# Patient Record
Sex: FEMALE | Race: WHITE | Employment: OTHER | URBAN - NONMETROPOLITAN AREA
[De-identification: names, ages, dates, MRNs, and addresses within clinical notes are randomized per-mention and may not be internally consistent; named-entity substitution may affect disease eponyms.]

---

## 2017-03-21 ENCOUNTER — PROCEDURE VISIT (OUTPATIENT)
Dept: PHYSICAL MEDICINE AND REHAB | Age: 52
End: 2017-03-21

## 2017-03-21 DIAGNOSIS — M54.16 LUMBAR RADICULOPATHY: Primary | ICD-10-CM

## 2017-03-21 DIAGNOSIS — R20.0 RIGHT LEG NUMBNESS: ICD-10-CM

## 2017-03-21 PROCEDURE — 95886 MUSC TEST DONE W/N TEST COMP: CPT | Performed by: PSYCHIATRY & NEUROLOGY

## 2017-03-21 PROCEDURE — 95910 NRV CNDJ TEST 7-8 STUDIES: CPT | Performed by: PSYCHIATRY & NEUROLOGY

## 2017-08-25 ENCOUNTER — TELEPHONE (OUTPATIENT)
Dept: CARDIOLOGY | Age: 52
End: 2017-08-25

## 2017-09-09 ENCOUNTER — HOSPITAL ENCOUNTER (OUTPATIENT)
Age: 52
Discharge: HOME | End: 2017-09-09
Payer: COMMERCIAL

## 2017-09-09 ENCOUNTER — HOSPITAL ENCOUNTER (OUTPATIENT)
Dept: GENERAL RADIOLOGY | Age: 52
Discharge: HOME | End: 2017-09-09
Payer: COMMERCIAL

## 2017-09-09 DIAGNOSIS — Z01.811 PRE-OP CHEST EXAM: ICD-10-CM

## 2017-09-09 LAB
ANION GAP SERPL CALCULATED.3IONS-SCNC: 11 MEQ/L (ref 8–16)
BUN BLDV-MCNC: 10 MG/DL (ref 7–22)
CALCIUM SERPL-MCNC: 9.5 MG/DL (ref 8.5–10.5)
CHLORIDE BLD-SCNC: 103 MEQ/L (ref 98–111)
CO2: 29 MEQ/L (ref 23–33)
CREAT SERPL-MCNC: 0.6 MG/DL (ref 0.4–1.2)
GFR SERPL CREATININE-BSD FRML MDRD: > 90 ML/MIN/1.73M2
GLUCOSE BLD-MCNC: 169 MG/DL (ref 70–108)
HCT VFR BLD CALC: 39.6 % (ref 37–47)
HEMOGLOBIN: 13.6 GM/DL (ref 12–16)
INR BLD: 0.97 (ref 0.85–1.13)
MCH RBC QN AUTO: 31.2 PG (ref 27–31)
MCHC RBC AUTO-ENTMCNC: 34.4 GM/DL (ref 33–37)
MCV RBC AUTO: 90.9 FL (ref 81–99)
PDW BLD-RTO: 12.6 % (ref 11.5–14.5)
PLATELET # BLD: 268 THOU/MM3 (ref 130–400)
PMV BLD AUTO: 9.9 MCM (ref 7.4–10.4)
POTASSIUM SERPL-SCNC: 4.2 MEQ/L (ref 3.5–5.2)
RBC # BLD: 4.36 MILL/MM3 (ref 4.2–5.4)
SODIUM BLD-SCNC: 143 MEQ/L (ref 135–145)
WBC # BLD: 5.9 THOU/MM3 (ref 4.8–10.8)

## 2017-09-09 PROCEDURE — 93005 ELECTROCARDIOGRAM TRACING: CPT

## 2017-09-09 PROCEDURE — 80048 BASIC METABOLIC PNL TOTAL CA: CPT

## 2017-09-09 PROCEDURE — 85027 COMPLETE CBC AUTOMATED: CPT

## 2017-09-09 PROCEDURE — 36415 COLL VENOUS BLD VENIPUNCTURE: CPT

## 2017-09-09 PROCEDURE — 85610 PROTHROMBIN TIME: CPT

## 2017-09-09 PROCEDURE — 71020 XR CHEST STANDARD TWO VW: CPT

## 2017-09-09 PROCEDURE — 87081 CULTURE SCREEN ONLY: CPT

## 2017-09-10 LAB — MRSA SCREEN: NORMAL

## 2017-09-11 ENCOUNTER — OFFICE VISIT (OUTPATIENT)
Dept: CARDIOLOGY CLINIC | Age: 52
End: 2017-09-11
Payer: COMMERCIAL

## 2017-09-11 VITALS
BODY MASS INDEX: 40.77 KG/M2 | SYSTOLIC BLOOD PRESSURE: 168 MMHG | DIASTOLIC BLOOD PRESSURE: 88 MMHG | WEIGHT: 244.7 LBS | HEIGHT: 65 IN | HEART RATE: 104 BPM

## 2017-09-11 DIAGNOSIS — Z01.810 PREOP CARDIOVASCULAR EXAM: Primary | ICD-10-CM

## 2017-09-11 LAB
EKG ATRIAL RATE: 72 BPM
EKG P AXIS: 37 DEGREES
EKG P-R INTERVAL: 138 MS
EKG Q-T INTERVAL: 380 MS
EKG QRS DURATION: 74 MS
EKG QTC CALCULATION (BAZETT): 416 MS
EKG R AXIS: 38 DEGREES
EKG T AXIS: 62 DEGREES
EKG VENTRICULAR RATE: 72 BPM

## 2017-09-11 PROCEDURE — 99213 OFFICE O/P EST LOW 20 MIN: CPT | Performed by: INTERNAL MEDICINE

## 2017-09-11 RX ORDER — SPIRONOLACTONE AND HYDROCHLOROTHIAZIDE 25; 25 MG/1; MG/1
1 TABLET ORAL DAILY
Qty: 30 TABLET | Refills: 3 | Status: SHIPPED | OUTPATIENT
Start: 2017-09-11 | End: 2018-01-12 | Stop reason: SDUPTHER

## 2017-09-19 ENCOUNTER — HOSPITAL ENCOUNTER (OUTPATIENT)
Dept: NON INVASIVE DIAGNOSTICS | Age: 52
Discharge: HOME | End: 2017-09-19
Payer: COMMERCIAL

## 2017-09-19 DIAGNOSIS — Z01.810 PREOP CARDIOVASCULAR EXAM: ICD-10-CM

## 2017-09-19 LAB
LV EF: 65 %
LVEF MODALITY: NORMAL

## 2017-09-19 PROCEDURE — 93306 TTE W/DOPPLER COMPLETE: CPT

## 2017-10-09 ENCOUNTER — OFFICE VISIT (OUTPATIENT)
Dept: PULMONOLOGY | Age: 52
End: 2017-10-09
Payer: COMMERCIAL

## 2017-10-09 VITALS
DIASTOLIC BLOOD PRESSURE: 62 MMHG | WEIGHT: 237 LBS | RESPIRATION RATE: 16 BRPM | HEIGHT: 65 IN | OXYGEN SATURATION: 96 % | SYSTOLIC BLOOD PRESSURE: 98 MMHG | BODY MASS INDEX: 39.49 KG/M2 | HEART RATE: 77 BPM

## 2017-10-09 DIAGNOSIS — G47.33 OBSTRUCTIVE SLEEP APNEA ON CPAP: Primary | ICD-10-CM

## 2017-10-09 DIAGNOSIS — Z99.89 OBSTRUCTIVE SLEEP APNEA ON CPAP: Primary | ICD-10-CM

## 2017-10-09 PROCEDURE — 99213 OFFICE O/P EST LOW 20 MIN: CPT | Performed by: PHYSICIAN ASSISTANT

## 2017-10-09 NOTE — PROGRESS NOTES
CRUCIATE LIGAMENT REPAIR Left 2002    BACK SURGERY      lumbar    CYST REMOVAL  2000    Left wrist, hand and ankle    ENDOMETRIAL ABLATION  2008    Dr López Conception ENDOSCOPY, COLON, DIAGNOSTIC      HYSTERECTOMY, TOTAL ABDOMINAL  11/09/2016    Robotic    LOBECTOMY, THYROID  5/21/10    right lobectomy    SPINAL FUSION  2/28/13    c5, c6, c7    SPINAL FUSION  10/03/2017       Social History   Substance Use Topics    Smoking status: Never Smoker    Smokeless tobacco: Never Used    Alcohol use 0.0 oz/week      Comment: very rare       Allergies   Allergen Reactions    Shellfish-Derived Products Shortness Of Breath     Swelling    Ampicillin Rash       Current Outpatient Prescriptions   Medication Sig Dispense Refill    CPAP Machine MISC by Does not apply route Please change CPAP pressure to 12 cm H20. 1 each 0    spironolactone-hydrochlorothiazide (ALDACTAZIDE) 25-25 MG per tablet Take 1 tablet by mouth daily 30 tablet 3    valsartan (DIOVAN) 320 MG tablet Take 320 mg by mouth daily      ibuprofen (ADVIL;MOTRIN) 600 MG tablet Take 1 tablet by mouth every 6 hours as needed for Pain 60 tablet 1    linaclotide (LINZESS) 145 MCG capsule Take 145 mcg by mouth every morning (before breakfast)      omeprazole (PRILOSEC) 20 MG capsule Take 20 mg by mouth daily       therapeutic multivitamin-minerals (THERAGRAN-M) tablet Take 1 tablet by mouth daily.  escitalopram (LEXAPRO) 10 MG tablet Take 10 mg by mouth daily. No current facility-administered medications for this visit.         Family History   Problem Relation Age of Onset    Arthritis Mother     Cancer Mother     High Blood Pressure Mother     Stroke Mother     Heart Attack Maternal Grandmother     Heart Attack Maternal Grandfather     Diabetes Neg Hx     Heart Disease Neg Hx         Review of Systems -   General ROS: lost 7 lbs over 2 months  ENT ROS: negative for - nasal congestion, oral lesions or sore throat  Hematological and Lymphatic ROS: negative  Endocrine ROS: negative  Respiratory ROS: no cough, shortness of breath, or wheezing  Cardiovascular ROS: no chest pain or dyspnea on exertion  Gastrointestinal ROS: no abdominal pain, change in bowel habits, or black or bloody stools  Musculoskeletal ROS: negative  Neurological ROS: negative    Physical Exam:    BMI:  Body mass index is 39.44 kg/m². Wt Readings from Last 3 Encounters:   10/09/17 237 lb (107.5 kg)   09/11/17 244 lb 11.2 oz (111 kg)   12/13/16 235 lb (106.6 kg)     Vitals: BP 98/62   Pulse 77   Resp 16   Ht 5' 5\" (1.651 m)   Wt 237 lb (107.5 kg)   SpO2 96% Comment: R/A at rest  BMI 39.44 kg/m²       General appearance: alert and oriented to person, place and time, well-developed and well-nourished, in no acute distress  Nose: Nares normal. Septum midline. Mucosa normal. No drainage or sinus tenderness. Oropharynx:  negative  Lungs: clear to auscultation bilaterally  Heart: regular rate and rhythm, S1, S2 normal, no murmur, click, rub or gallop  Extremities: extremities normal, atraumatic, no cyanosis or edema  Neurologic: Mental status: Alert, oriented, thought content appropriate      ASSESSMENT/DIAGNOSIS    1. Obstructive sleep apnea on CPAP  DME Order for CPAP as OP            Plan   Do you need any equipment today? Yes update supplies  - Will increase pressure to 12 for comfort  - She  was advised to continue current positive airway pressure therapy with above described pressure. - She  advised to keep good compliance with current recommended pressure to get optimal results and clinical improvement  - Recommend 7-9 hours of sleep with PAP  - She was advised to call [employer] company regarding supplies if needed. - She call my office for earlier appointment if needed for worsening of sleep symptoms.   - She was instructed on weight loss  - Rhonda Marcy was educated about my impression and plan. Patient verbalizes understanding.   We will see Franck Leal back in: 1 year with

## 2017-10-20 ENCOUNTER — HOSPITAL ENCOUNTER (OUTPATIENT)
Dept: WOMENS IMAGING | Age: 52
Discharge: HOME | End: 2017-10-20
Payer: COMMERCIAL

## 2017-10-20 DIAGNOSIS — Z12.31 VISIT FOR SCREENING MAMMOGRAM: ICD-10-CM

## 2017-10-20 PROCEDURE — 77063 BREAST TOMOSYNTHESIS BI: CPT

## 2017-12-28 ENCOUNTER — OFFICE VISIT (OUTPATIENT)
Dept: CARDIOLOGY CLINIC | Age: 52
End: 2017-12-28
Payer: COMMERCIAL

## 2017-12-28 VITALS
DIASTOLIC BLOOD PRESSURE: 70 MMHG | WEIGHT: 238 LBS | SYSTOLIC BLOOD PRESSURE: 122 MMHG | HEIGHT: 65 IN | HEART RATE: 100 BPM | BODY MASS INDEX: 39.65 KG/M2

## 2017-12-28 DIAGNOSIS — I10 ESSENTIAL HYPERTENSION: Primary | ICD-10-CM

## 2017-12-28 DIAGNOSIS — R00.0 TACHYCARDIA: ICD-10-CM

## 2017-12-28 PROCEDURE — 99213 OFFICE O/P EST LOW 20 MIN: CPT | Performed by: INTERNAL MEDICINE

## 2017-12-28 RX ORDER — METOPROLOL SUCCINATE 25 MG/1
25 TABLET, EXTENDED RELEASE ORAL DAILY
Qty: 30 TABLET | Refills: 3 | Status: SHIPPED | OUTPATIENT
Start: 2017-12-28 | End: 2018-04-23 | Stop reason: SDUPTHER

## 2017-12-28 NOTE — PROGRESS NOTES
Pt here for 3 mo check up     Pt denies chest pain, dizziness, peripheral edema,     Pt continues with sob on exertion at times, heart palpitations

## 2017-12-28 NOTE — PROGRESS NOTES
Agustina Primrose is a 47 y.o. female is here for  for atherosclerotic disease has tachycardia and no trigger point  and has had no chest pains . Intensity of the pain none provocation of the pain none , what relieves the pain none  where does  the pain migrates to none and no nausea no fever and chills and no sob with and without activity. No evidence of swelling in legs no palpitations and no syncopal episodes and no dizziness ,no bleeding ,or urination  Problems ,no double visions ,no speech problems and no bowel problems or diarrhea and tolerating medications     Patient Active Problem List   Diagnosis    GERD (gastroesophageal reflux disease)    Dysphonia    Dysphagia    Left thyroid nodule    Pyelonephritis    Leukocytosis    Hypertension    Weakness    Depression    History of fusion of cervical spine    Status post partial thyroidectomy    Presbyesophagus    Obesity (BMI 30-39. 9)    Obstructive sleep apnea on CPAP    EKG abnormalities     Past Medical History:   Diagnosis Date    Chronic back pain     Depression 3/24/2013    GERD (gastroesophageal reflux disease)     Hypertension     Obesity 3/24/2013    HENRY on CPAP     Osteoarthritis     PONV (postoperative nausea and vomiting)     Pyelonephritis 3/24/2013    Unspecified sleep apnea     with C-Pap    UTI (urinary tract infection)      Social History   Substance Use Topics    Smoking status: Never Smoker    Smokeless tobacco: Never Used    Alcohol use 0.0 oz/week      Comment: very rare     Allergies   Allergen Reactions    Shellfish-Derived Products Shortness Of Breath     Swelling    Ampicillin Rash     Current Outpatient Prescriptions   Medication Sig Dispense Refill    CPAP Machine MISC by Does not apply route Please change CPAP pressure to 12 cm H20. 1 each 0    spironolactone-hydrochlorothiazide (ALDACTAZIDE) 25-25 MG per tablet Take 1 tablet by mouth daily 30 tablet 3    ibuprofen (ADVIL;MOTRIN) 600 MG tablet Take 1 tablet by mouth every 6 hours as needed for Pain 60 tablet 1    linaclotide (LINZESS) 145 MCG capsule Take 145 mcg by mouth every morning (before breakfast)      omeprazole (PRILOSEC) 20 MG capsule Take 20 mg by mouth daily       therapeutic multivitamin-minerals (THERAGRAN-M) tablet Take 1 tablet by mouth daily.  escitalopram (LEXAPRO) 10 MG tablet Take 10 mg by mouth daily. No current facility-administered medications for this visit. REVIEW OF SYSTEM  Constitutional  symptoms such as fever chills and malaise and weakness  Are negative   HE ENT negative  HEART all negative  LUNGS all negative   ABDOMEN all negative  GENITAL URINARY all within normal limits  NEUROLOGY all negative  NECK all negative   SKIN all negative  MUSCULOSKELETAL negative  HEMATOLOGICAL negative  PSYCHIATRIC  negative    Vitals:    12/28/17 1317   BP: 122/70   Pulse: 100   Weight: 238 lb (108 kg)   Height: 5' 5\" (1.651 m)       EXAMINATION   Gen.  Appearance is reflective of nutritional normal nutrition and well-groomed   Appears  stated age    Carotid the amplitude of  carotid artery  And up stroke are present or diminished and bruits are absent   Thyroid palpation appears to be  Normal    MYLES  And evaluated and within normal limits  And size of pupils is normal  HEENT  teeth appears to be symmetrical without poor dentition and gums  and palate appears to be healthy and  head is normal cephalic  Without signs of trauma and eyes are without trauma no conjunctiva and Iids retracting and not retracted ptosis and no ptosis and without  erythematous changes and  Nose is symmetrical  without drainage  and ears are  without tinnitus  and throat is clear   JUGULAR VEINS  are not elevated and tongue is mid line no masses presence and no rush A waves present   LYMPHATICS are without adenopathies at least on exam   CHEST  No biventricular heaves and thrills and no right ventricular heaves and examination without signs of trauma medicine     Electronically signed by Raciel Wilkins DO on 12/28/2017 at 1:24 PM

## 2018-01-11 ENCOUNTER — HOSPITAL ENCOUNTER (OUTPATIENT)
Dept: NON INVASIVE DIAGNOSTICS | Age: 53
Discharge: HOME | End: 2018-01-11
Payer: COMMERCIAL

## 2018-01-11 ENCOUNTER — HOSPITAL ENCOUNTER (OUTPATIENT)
Dept: INTERVENTIONAL RADIOLOGY/VASCULAR | Age: 53
Discharge: HOME | End: 2018-01-11
Payer: COMMERCIAL

## 2018-01-11 VITALS
DIASTOLIC BLOOD PRESSURE: 54 MMHG | OXYGEN SATURATION: 95 % | RESPIRATION RATE: 18 BRPM | BODY MASS INDEX: 39.15 KG/M2 | SYSTOLIC BLOOD PRESSURE: 119 MMHG | WEIGHT: 235 LBS | HEART RATE: 89 BPM | TEMPERATURE: 98 F | HEIGHT: 65 IN

## 2018-01-11 DIAGNOSIS — R00.0 TACHYCARDIA: ICD-10-CM

## 2018-01-11 DIAGNOSIS — I10 ESSENTIAL HYPERTENSION: ICD-10-CM

## 2018-01-11 PROCEDURE — 93226 XTRNL ECG REC<48 HR SCAN A/R: CPT

## 2018-01-11 PROCEDURE — A6413 ADHESIVE BANDAGE, FIRST-AID: HCPCS

## 2018-01-11 PROCEDURE — 2500000003 HC RX 250 WO HCPCS

## 2018-01-11 PROCEDURE — 93225 XTRNL ECG REC<48 HRS REC: CPT

## 2018-01-11 PROCEDURE — 6360000004 HC RX CONTRAST MEDICATION: Performed by: RADIOLOGY

## 2018-01-11 PROCEDURE — 6360000002 HC RX W HCPCS

## 2018-01-11 PROCEDURE — 62323 NJX INTERLAMINAR LMBR/SAC: CPT | Performed by: RADIOLOGY

## 2018-01-11 RX ORDER — METHYLPREDNISOLONE ACETATE 80 MG/ML
80 INJECTION, SUSPENSION INTRA-ARTICULAR; INTRALESIONAL; INTRAMUSCULAR; SOFT TISSUE ONCE
Status: COMPLETED | OUTPATIENT
Start: 2018-01-11 | End: 2018-01-11

## 2018-01-11 RX ORDER — BUPIVACAINE HYDROCHLORIDE 2.5 MG/ML
5 INJECTION, SOLUTION EPIDURAL; INFILTRATION; INTRACAUDAL ONCE
Status: COMPLETED | OUTPATIENT
Start: 2018-01-11 | End: 2018-01-11

## 2018-01-11 RX ADMIN — IOHEXOL 1 ML: 180 INJECTION INTRAVENOUS at 10:11

## 2018-01-11 RX ADMIN — BUPIVACAINE HYDROCHLORIDE 2 ML: 2.5 INJECTION, SOLUTION EPIDURAL; INFILTRATION; INTRACAUDAL at 10:11

## 2018-01-11 RX ADMIN — METHYLPREDNISOLONE ACETATE 80 MG: 80 INJECTION, SUSPENSION INTRA-ARTICULAR; INTRALESIONAL; INTRAMUSCULAR; SOFT TISSUE at 10:11

## 2018-01-11 ASSESSMENT — PAIN SCALES - GENERAL
PAINLEVEL_OUTOF10: 0
PAINLEVEL_OUTOF10: 5
PAINLEVEL_OUTOF10: 0
PAINLEVEL_OUTOF10: 5

## 2018-01-11 ASSESSMENT — PAIN DESCRIPTION - DESCRIPTORS: DESCRIPTORS: ACHING;TINGLING

## 2018-01-11 ASSESSMENT — PAIN DESCRIPTION - LOCATION: LOCATION: BACK

## 2018-01-11 ASSESSMENT — PAIN DESCRIPTION - PAIN TYPE: TYPE: CHRONIC PAIN

## 2018-01-11 ASSESSMENT — PAIN DESCRIPTION - ORIENTATION: ORIENTATION: RIGHT

## 2018-01-11 ASSESSMENT — PAIN - FUNCTIONAL ASSESSMENT: PAIN_FUNCTIONAL_ASSESSMENT: 0-10

## 2018-01-11 NOTE — PROCEDURES
The skin was prepped and a holter monitor was applied. The patient was instructed on the documentation of symptoms and the purpose of the holter as well as the things to avoid while wearing the holter. The patient was instructed to remove and return the holter on 01/12/18.   The serial number of the holter that was applied is [address]

## 2018-01-11 NOTE — PROGRESS NOTES
Department of Radiology  Post Procedure Progress Note    Pre-Procedure Diagnosis:  Lumbar radiculopathy     Procedure Performed:  Epidural block/steroid injection      Anesthesia: local     Findings: successful    Immediate Complications:  None    Estimated Blood Loss: minimal    SEE DICTATED PROCEDURE NOTE FOR COMPLETE DETAILS.       Velma Cerrato MD   1/11/2018 10:12 AM

## 2018-01-11 NOTE — PROGRESS NOTES
Patient being discharged  in stable condition. Written and verbal instructions given to patient, she voices no questions are concerns.

## 2018-01-11 NOTE — PROGRESS NOTES
__m Safety:       (Environmental)   [place] to environment   Ensure ID band is correct and in place/ allergy band as needed   Assess for fall risk   Initiate fall precautions as applicable (fall band, side rails, etc.)   Call light within reach   Bed in low position/ wheels locked    ____ Pain:        Assess pain level and characteristics   Administer analgesics as ordered   Assess effectiveness of pain management and report to MD as needed    ____ Knowledge Deficit:   Assess baseline knowledge   Provide teaching at level of understanding   Provide teaching via preferred learning method   Evaluate teaching effectiveness    ____ Hemodynamic/Respiratory Status:       (Pre and Post Procedure Monitoring)   Assess/Monitor vital signs and LOC   Assess Baseline SpO2 prior to any sedation   Obtain weight/height   Assess vital signs/ LOC until patient meets discharge criteria   Monitor procedure site and notify MD of any issues    ____ Infection-Risk of Central Venous Catheter:   Monitor for infection signs and symptoms (catheter site redness, temperature elevation, etc)   Assess for infection risks   Educate regarding infection prevention   Manage central venous catheter (flushes/ dressing changes per protocol)

## 2018-01-12 RX ORDER — SPIRONOLACTONE AND HYDROCHLOROTHIAZIDE 25; 25 MG/1; MG/1
TABLET ORAL
Qty: 30 TABLET | Refills: 6 | Status: SHIPPED | OUTPATIENT
Start: 2018-01-12 | End: 2018-06-18 | Stop reason: SDUPTHER

## 2018-01-15 NOTE — PROCEDURES
[address] [place] [address]                                  HOLTER MONITOR    PATIENT NAME: Dora Alicea                  :        1965  MED REC NO:   [id number]                           ROOM:  ACCOUNT NO:   [de-identified]                           ADMIT DATE: 2018  PROVIDER:     BECKY Portillo 24-HOURS    DATE OF STUDY:  2018    INDICATION OF THE PROCEDURE:  Irregular heart rate. FINDINGS:  The patient was given monitor for 24 hours. 117/11 QRS complex  was analyzed. Six were ventricular beats and all of them were isolated  PVCs. 12 supraventricular beats and most of them were isolated PACs. There were three couplets. Minimum heart rate 57 beats per minute. Maximum heart rate 146 beats per minutes. Average heart rate 82 beats per  minute. The patient was in tachycardic range for 16% of the total time. 1.1 second pause at early morning. CONCLUSION:  No evidence of malignant atrial or ventricular arrhythmias. The patient was in tachycardic range for 16% of the total time. Clinical  correlation is required.         Parrish Adan M.D.    D: 2018 23:57:57       T: 01/15/2018 0:29:08     [id number]  Job#: [id number]     Doc#: [id number]    CC:

## 2018-01-24 ENCOUNTER — TELEPHONE (OUTPATIENT)
Dept: CARDIOLOGY CLINIC | Age: 53
End: 2018-01-24

## 2018-01-24 NOTE — TELEPHONE ENCOUNTER
01/24/2018 Dr. Teddy Hendricks office in [facility] [phone] called office to get pre op clearance form filled out for revision of sinus surgery with septoplasty 02/12/2018. Patient last saw Dr. Ann Bravo 12/28/2017. They will fax clearance form to be filled out. da

## 2018-02-07 ENCOUNTER — HOSPITAL ENCOUNTER (OUTPATIENT)
Age: 53
End: 2018-02-07
Payer: COMMERCIAL

## 2018-02-07 ENCOUNTER — HOSPITAL ENCOUNTER (OUTPATIENT)
Age: 53
Discharge: HOME | End: 2018-02-07
Payer: COMMERCIAL

## 2018-02-07 LAB — POTASSIUM SERPL-SCNC: 3.9 MEQ/L (ref 3.5–5.1)

## 2018-02-07 PROCEDURE — 84132 ASSAY OF SERUM POTASSIUM: CPT

## 2018-02-07 PROCEDURE — 36415 COLL VENOUS BLD VENIPUNCTURE: CPT

## 2018-04-16 ENCOUNTER — HOSPITAL ENCOUNTER (OUTPATIENT)
Age: 53
Discharge: HOME | End: 2018-04-16
Payer: COMMERCIAL

## 2018-04-16 LAB
ANION GAP SERPL CALCULATED.3IONS-SCNC: 13 MEQ/L (ref 8–16)
APTT: 27.8 SECONDS (ref 22–38)
BUN BLDV-MCNC: 12 MG/DL (ref 7–22)
CALCIUM SERPL-MCNC: 9.7 MG/DL (ref 8.5–10.5)
CHLORIDE BLD-SCNC: 98 MEQ/L (ref 98–111)
CO2: 29 MEQ/L (ref 23–33)
CREAT SERPL-MCNC: 0.8 MG/DL (ref 0.4–1.2)
GFR SERPL CREATININE-BSD FRML MDRD: 75 ML/MIN/1.73M2
GLUCOSE BLD-MCNC: 183 MG/DL (ref 70–108)
HCT VFR BLD CALC: 37.6 % (ref 37–47)
HEMOGLOBIN: 13.1 GM/DL (ref 12–16)
INR BLD: 0.97 (ref 0.85–1.13)
MCH RBC QN AUTO: 31.5 PG (ref 27–31)
MCHC RBC AUTO-ENTMCNC: 34.8 GM/DL (ref 33–37)
MCV RBC AUTO: 90.4 FL (ref 81–99)
MRSA SCREEN RT-PCR: NEGATIVE
PDW BLD-RTO: 12.4 % (ref 11.5–14.5)
PLATELET # BLD: 326 THOU/MM3 (ref 130–400)
PMV BLD AUTO: 10 FL (ref 7.4–10.4)
POTASSIUM SERPL-SCNC: 4.2 MEQ/L (ref 3.5–5.2)
RBC # BLD: 4.16 MILL/MM3 (ref 4.2–5.4)
SODIUM BLD-SCNC: 140 MEQ/L (ref 135–145)
WBC # BLD: 7.5 THOU/MM3 (ref 4.8–10.8)

## 2018-04-16 PROCEDURE — 80048 BASIC METABOLIC PNL TOTAL CA: CPT

## 2018-04-16 PROCEDURE — 87641 MR-STAPH DNA AMP PROBE: CPT

## 2018-04-16 PROCEDURE — 85610 PROTHROMBIN TIME: CPT

## 2018-04-16 PROCEDURE — 85027 COMPLETE CBC AUTOMATED: CPT

## 2018-04-16 PROCEDURE — 36415 COLL VENOUS BLD VENIPUNCTURE: CPT

## 2018-04-16 PROCEDURE — 85730 THROMBOPLASTIN TIME PARTIAL: CPT

## 2018-04-24 RX ORDER — METOPROLOL SUCCINATE 25 MG/1
25 TABLET, EXTENDED RELEASE ORAL DAILY
Qty: 30 TABLET | Refills: 3 | Status: SHIPPED | OUTPATIENT
Start: 2018-04-24 | End: 2018-06-18 | Stop reason: SDUPTHER

## 2018-06-18 ENCOUNTER — OFFICE VISIT (OUTPATIENT)
Dept: CARDIOLOGY CLINIC | Age: 53
End: 2018-06-18
Payer: COMMERCIAL

## 2018-06-18 VITALS
HEIGHT: 65 IN | BODY MASS INDEX: 38.25 KG/M2 | DIASTOLIC BLOOD PRESSURE: 64 MMHG | HEART RATE: 80 BPM | SYSTOLIC BLOOD PRESSURE: 138 MMHG | WEIGHT: 229.6 LBS

## 2018-06-18 DIAGNOSIS — I25.10 ASCVD (ARTERIOSCLEROTIC CARDIOVASCULAR DISEASE): Primary | ICD-10-CM

## 2018-06-18 PROCEDURE — 99213 OFFICE O/P EST LOW 20 MIN: CPT | Performed by: INTERNAL MEDICINE

## 2018-06-18 RX ORDER — SPIRONOLACTONE AND HYDROCHLOROTHIAZIDE 25; 25 MG/1; MG/1
TABLET ORAL
Qty: 90 TABLET | Refills: 1 | Status: ON HOLD | OUTPATIENT
Start: 2018-06-18 | End: 2019-09-08 | Stop reason: SDUPTHER

## 2018-06-18 RX ORDER — METOPROLOL SUCCINATE 25 MG/1
25 TABLET, EXTENDED RELEASE ORAL DAILY
Qty: 90 TABLET | Refills: 1 | Status: SHIPPED | OUTPATIENT
Start: 2018-06-18 | End: 2021-05-17

## 2018-06-18 ASSESSMENT — ENCOUNTER SYMPTOMS
HOARSE VOICE: 0
BLOATING: 0
ABDOMINAL PAIN: 0
COUGH: 0
ORTHOPNEA: 0
EYE DISCHARGE: 0
DOUBLE VISION: 0
CHANGE IN BOWEL HABIT: 0
BLURRED VISION: 0
HEMOPTYSIS: 0
CONSTIPATION: 0
EYE PAIN: 0
SPUTUM PRODUCTION: 0
DIARRHEA: 0
BOWEL INCONTINENCE: 0
SHORTNESS OF BREATH: 0
BACK PAIN: 0

## 2018-10-12 ENCOUNTER — OFFICE VISIT (OUTPATIENT)
Dept: PULMONOLOGY | Age: 53
End: 2018-10-12
Payer: COMMERCIAL

## 2018-10-12 VITALS
BODY MASS INDEX: 39.49 KG/M2 | HEIGHT: 65 IN | DIASTOLIC BLOOD PRESSURE: 84 MMHG | WEIGHT: 237 LBS | SYSTOLIC BLOOD PRESSURE: 128 MMHG | OXYGEN SATURATION: 96 % | HEART RATE: 81 BPM

## 2018-10-12 DIAGNOSIS — Z99.89 OBSTRUCTIVE SLEEP APNEA ON CPAP: Primary | ICD-10-CM

## 2018-10-12 DIAGNOSIS — G47.33 OBSTRUCTIVE SLEEP APNEA ON CPAP: Primary | ICD-10-CM

## 2018-10-12 PROCEDURE — 99213 OFFICE O/P EST LOW 20 MIN: CPT | Performed by: PHYSICIAN ASSISTANT

## 2018-10-12 ASSESSMENT — ENCOUNTER SYMPTOMS
BACK PAIN: 0
NAUSEA: 0
SHORTNESS OF BREATH: 0
COUGH: 0
ALLERGIC/IMMUNOLOGIC NEGATIVE: 1
WHEEZING: 0
STRIDOR: 0
EYES NEGATIVE: 1
DIARRHEA: 0
CHEST TIGHTNESS: 0

## 2018-10-12 NOTE — PROGRESS NOTES
Left wrist, hand and ankle    ENDOMETRIAL ABLATION  2008    Dr Rakel Posadas    ENDOSCOPY, COLON, DIAGNOSTIC      HYSTERECTOMY, TOTAL ABDOMINAL  11/09/2016    Robotic    LOBECTOMY, THYROID  5/21/10    right lobectomy    NASAL SINUS SURGERY  11/2017    Dr. Ann Reece in [place] [address]  2/28/13    c5, c6, c7    SPINAL FUSION  10/03/2017       Social History   Substance Use Topics    Smoking status: Never Smoker    Smokeless tobacco: Never Used    Alcohol use 0.0 oz/week      Comment: very rare       Allergies   Allergen Reactions    Shellfish-Derived Products Shortness Of Breath     Swelling    Ampicillin Rash       Current Outpatient Prescriptions   Medication Sig Dispense Refill    metFORMIN (GLUCOPHAGE) 500 MG tablet Take 500 mg by mouth 2 times daily (with meals)      spironolactone-hydrochlorothiazide (ALDACTAZIDE) 25-25 MG per tablet take 1 tablet by mouth once daily 90 tablet 1    metoprolol succinate (TOPROL XL) 25 MG extended release tablet Take 1 tablet by mouth daily 90 tablet 1    CPAP Machine MISC by Does not apply route Please change CPAP pressure to 12 cm H20. 1 each 0    ibuprofen (ADVIL;MOTRIN) 600 MG tablet Take 1 tablet by mouth every 6 hours as needed for Pain 60 tablet 1    linaclotide (LINZESS) 145 MCG capsule Take 145 mcg by mouth every morning (before breakfast)      omeprazole (PRILOSEC) 20 MG capsule Take 20 mg by mouth daily       therapeutic multivitamin-minerals (THERAGRAN-M) tablet Take 1 tablet by mouth daily.  escitalopram (LEXAPRO) 10 MG tablet Take 10 mg by mouth daily. No current facility-administered medications for this visit.         Family History   Problem Relation Age of Onset    Arthritis Mother     Cancer Mother     High Blood Pressure Mother     Stroke Mother     Heart Attack Maternal Grandmother     Heart Attack Maternal Grandfather     Diabetes Neg Hx     Heart Disease Neg Hx         Review of Systems -   Review of Systems

## 2018-11-28 ENCOUNTER — HOSPITAL ENCOUNTER (OUTPATIENT)
Age: 53
Discharge: HOME | End: 2018-11-28
Payer: COMMERCIAL

## 2018-11-28 LAB
ALBUMIN SERPL-MCNC: 4.4 G/DL (ref 3.5–5.1)
ALP BLD-CCNC: 84 U/L (ref 38–126)
ALT SERPL-CCNC: 57 U/L (ref 11–66)
ANION GAP SERPL CALCULATED.3IONS-SCNC: 13 MEQ/L (ref 8–16)
AST SERPL-CCNC: 40 U/L (ref 5–40)
AVERAGE GLUCOSE: 192 MG/DL (ref 70–126)
BASOPHILS # BLD: 0.9 %
BASOPHILS ABSOLUTE: 0 THOU/MM3 (ref 0–0.1)
BILIRUB SERPL-MCNC: 0.4 MG/DL (ref 0.3–1.2)
BILIRUBIN DIRECT: < 0.2 MG/DL (ref 0–0.3)
BUN BLDV-MCNC: 13 MG/DL (ref 7–22)
CALCIUM SERPL-MCNC: 10.1 MG/DL (ref 8.5–10.5)
CHLORIDE BLD-SCNC: 95 MEQ/L (ref 98–111)
CHOLESTEROL, TOTAL: 186 MG/DL (ref 100–199)
CO2: 29 MEQ/L (ref 23–33)
CREAT SERPL-MCNC: 0.9 MG/DL (ref 0.4–1.2)
EOSINOPHIL # BLD: 2.2 %
EOSINOPHILS ABSOLUTE: 0.1 THOU/MM3 (ref 0–0.4)
ERYTHROCYTE [DISTWIDTH] IN BLOOD BY AUTOMATED COUNT: 13.9 % (ref 11.5–14.5)
ERYTHROCYTE [DISTWIDTH] IN BLOOD BY AUTOMATED COUNT: 43 FL (ref 35–45)
GFR SERPL CREATININE-BSD FRML MDRD: 66 ML/MIN/1.73M2
GLUCOSE BLD-MCNC: 151 MG/DL (ref 70–108)
HBA1C MFR BLD: 8.4 % (ref 4.4–6.4)
HCT VFR BLD CALC: 39.6 % (ref 37–47)
HDLC SERPL-MCNC: 38 MG/DL
HEMOGLOBIN: 12.5 GM/DL (ref 12–16)
IMMATURE GRANS (ABS): 0.02 THOU/MM3 (ref 0–0.07)
IMMATURE GRANULOCYTES: 0.4 %
LDL CHOLESTEROL CALCULATED: 102 MG/DL
LYMPHOCYTES # BLD: 26 %
LYMPHOCYTES ABSOLUTE: 1.4 THOU/MM3 (ref 1–4.8)
MCH RBC QN AUTO: 27.2 PG (ref 26–33)
MCHC RBC AUTO-ENTMCNC: 31.6 GM/DL (ref 32.2–35.5)
MCV RBC AUTO: 86.1 FL (ref 81–99)
MONOCYTES # BLD: 13.4 %
MONOCYTES ABSOLUTE: 0.7 THOU/MM3 (ref 0.4–1.3)
NUCLEATED RED BLOOD CELLS: 0 /100 WBC
PLATELET # BLD: 346 THOU/MM3 (ref 130–400)
PMV BLD AUTO: 11.2 FL (ref 9.4–12.4)
POTASSIUM SERPL-SCNC: 4.2 MEQ/L (ref 3.5–5.2)
RBC # BLD: 4.6 MILL/MM3 (ref 4.2–5.4)
SEG NEUTROPHILS: 57.1 %
SEGMENTED NEUTROPHILS ABSOLUTE COUNT: 3.1 THOU/MM3 (ref 1.8–7.7)
SODIUM BLD-SCNC: 137 MEQ/L (ref 135–145)
TOTAL PROTEIN: 8.2 G/DL (ref 6.1–8)
TRIGL SERPL-MCNC: 228 MG/DL (ref 0–199)
WBC # BLD: 5.4 THOU/MM3 (ref 4.8–10.8)

## 2018-11-28 PROCEDURE — 36415 COLL VENOUS BLD VENIPUNCTURE: CPT

## 2018-11-28 PROCEDURE — 85025 COMPLETE CBC W/AUTO DIFF WBC: CPT

## 2018-11-28 PROCEDURE — 83036 HEMOGLOBIN GLYCOSYLATED A1C: CPT

## 2018-11-28 PROCEDURE — 82248 BILIRUBIN DIRECT: CPT

## 2018-11-28 PROCEDURE — 80053 COMPREHEN METABOLIC PANEL: CPT

## 2018-11-28 PROCEDURE — 80061 LIPID PANEL: CPT

## 2019-02-05 ENCOUNTER — HOSPITAL ENCOUNTER (OUTPATIENT)
Dept: GENERAL RADIOLOGY | Age: 54
Discharge: HOME | End: 2019-02-05
Payer: COMMERCIAL

## 2019-02-05 ENCOUNTER — HOSPITAL ENCOUNTER (OUTPATIENT)
Dept: PREADMISSION TESTING | Age: 54
Discharge: HOME | End: 2019-02-09
Payer: COMMERCIAL

## 2019-02-05 VITALS
WEIGHT: 240.08 LBS | HEART RATE: 90 BPM | OXYGEN SATURATION: 99 % | TEMPERATURE: 99.3 F | RESPIRATION RATE: 16 BRPM | SYSTOLIC BLOOD PRESSURE: 169 MMHG | DIASTOLIC BLOOD PRESSURE: 88 MMHG | HEIGHT: 65 IN | BODY MASS INDEX: 40 KG/M2

## 2019-02-05 DIAGNOSIS — G89.4 CHRONIC PAIN SYNDROME: ICD-10-CM

## 2019-02-05 DIAGNOSIS — Z01.818 PREOP TESTING: ICD-10-CM

## 2019-02-05 LAB
ANION GAP SERPL CALCULATED.3IONS-SCNC: 14 MEQ/L (ref 8–16)
APTT: 28.4 SECONDS (ref 22–38)
BUN BLDV-MCNC: 10 MG/DL (ref 7–22)
CALCIUM SERPL-MCNC: 9.7 MG/DL (ref 8.5–10.5)
CHLORIDE BLD-SCNC: 97 MEQ/L (ref 98–111)
CO2: 27 MEQ/L (ref 23–33)
CREAT SERPL-MCNC: 0.7 MG/DL (ref 0.4–1.2)
EKG ATRIAL RATE: 76 BPM
EKG P AXIS: 31 DEGREES
EKG P-R INTERVAL: 146 MS
EKG Q-T INTERVAL: 384 MS
EKG QRS DURATION: 82 MS
EKG QTC CALCULATION (BAZETT): 432 MS
EKG R AXIS: 36 DEGREES
EKG T AXIS: 8 DEGREES
EKG VENTRICULAR RATE: 76 BPM
ERYTHROCYTE [DISTWIDTH] IN BLOOD BY AUTOMATED COUNT: 12.8 % (ref 11.5–14.5)
ERYTHROCYTE [DISTWIDTH] IN BLOOD BY AUTOMATED COUNT: 39.5 FL (ref 35–45)
GFR SERPL CREATININE-BSD FRML MDRD: 88 ML/MIN/1.73M2
GLUCOSE BLD-MCNC: 135 MG/DL (ref 70–108)
HCT VFR BLD CALC: 39.9 % (ref 37–47)
HEMOGLOBIN: 12.8 GM/DL (ref 12–16)
INR BLD: 1 (ref 0.85–1.13)
MCH RBC QN AUTO: 27.6 PG (ref 26–33)
MCHC RBC AUTO-ENTMCNC: 32.1 GM/DL (ref 32.2–35.5)
MCV RBC AUTO: 86 FL (ref 81–99)
MRSA NASAL SCREEN RT-PCR: NEGATIVE
PLATELET # BLD: 324 THOU/MM3 (ref 130–400)
PMV BLD AUTO: 10.6 FL (ref 9.4–12.4)
POTASSIUM SERPL-SCNC: 4.2 MEQ/L (ref 3.5–5.2)
RBC # BLD: 4.64 MILL/MM3 (ref 4.2–5.4)
SODIUM BLD-SCNC: 138 MEQ/L (ref 135–145)
STAPH AUREUS SCREEN RT-PCR: POSITIVE
WBC # BLD: 6.5 THOU/MM3 (ref 4.8–10.8)

## 2019-02-05 PROCEDURE — 93005 ELECTROCARDIOGRAM TRACING: CPT | Performed by: ORTHOPAEDIC SURGERY

## 2019-02-05 PROCEDURE — 87640 STAPH A DNA AMP PROBE: CPT

## 2019-02-05 PROCEDURE — 87081 CULTURE SCREEN ONLY: CPT

## 2019-02-05 PROCEDURE — 87641 MR-STAPH DNA AMP PROBE: CPT

## 2019-02-05 PROCEDURE — 80048 BASIC METABOLIC PNL TOTAL CA: CPT

## 2019-02-05 PROCEDURE — 85730 THROMBOPLASTIN TIME PARTIAL: CPT

## 2019-02-05 PROCEDURE — 36415 COLL VENOUS BLD VENIPUNCTURE: CPT

## 2019-02-05 PROCEDURE — 85027 COMPLETE CBC AUTOMATED: CPT

## 2019-02-05 PROCEDURE — 85610 PROTHROMBIN TIME: CPT

## 2019-02-05 PROCEDURE — 71046 X-RAY EXAM CHEST 2 VIEWS: CPT

## 2019-02-05 ASSESSMENT — PAIN DESCRIPTION - FREQUENCY: FREQUENCY: CONTINUOUS

## 2019-02-05 ASSESSMENT — PAIN DESCRIPTION - ONSET: ONSET: AWAKENED FROM SLEEP

## 2019-02-05 ASSESSMENT — PAIN DESCRIPTION - ORIENTATION: ORIENTATION: RIGHT;LOWER

## 2019-02-05 ASSESSMENT — PAIN DESCRIPTION - PAIN TYPE: TYPE: CHRONIC PAIN

## 2019-02-05 ASSESSMENT — PAIN SCALES - GENERAL: PAINLEVEL_OUTOF10: 7

## 2019-02-05 ASSESSMENT — PAIN DESCRIPTION - LOCATION: LOCATION: BACK;LEG

## 2019-02-05 ASSESSMENT — PAIN DESCRIPTION - DESCRIPTORS: DESCRIPTORS: ACHING;STABBING;NUMBNESS;TINGLING

## 2019-02-05 ASSESSMENT — PAIN DESCRIPTION - PROGRESSION: CLINICAL_PROGRESSION: GRADUALLY WORSENING

## 2019-02-06 PROCEDURE — 93010 ELECTROCARDIOGRAM REPORT: CPT | Performed by: INTERNAL MEDICINE

## 2019-02-07 LAB — MRSA SCREEN: NORMAL

## 2019-02-22 ENCOUNTER — APPOINTMENT (OUTPATIENT)
Dept: GENERAL RADIOLOGY | Age: 54
End: 2019-02-22
Attending: ORTHOPAEDIC SURGERY
Payer: COMMERCIAL

## 2019-02-22 ENCOUNTER — ANESTHESIA EVENT (OUTPATIENT)
Dept: OPERATING ROOM | Age: 54
End: 2019-02-22
Payer: COMMERCIAL

## 2019-02-22 ENCOUNTER — HOSPITAL ENCOUNTER (OUTPATIENT)
Age: 54
Discharge: HOME | End: 2019-02-23
Attending: ORTHOPAEDIC SURGERY | Admitting: ORTHOPAEDIC SURGERY
Payer: COMMERCIAL

## 2019-02-22 ENCOUNTER — ANESTHESIA (OUTPATIENT)
Dept: OPERATING ROOM | Age: 54
End: 2019-02-22
Payer: COMMERCIAL

## 2019-02-22 VITALS
SYSTOLIC BLOOD PRESSURE: 167 MMHG | RESPIRATION RATE: 25 BRPM | TEMPERATURE: 97.7 F | DIASTOLIC BLOOD PRESSURE: 79 MMHG | OXYGEN SATURATION: 100 %

## 2019-02-22 PROBLEM — G89.4 CHRONIC PAIN SYNDROME: Status: ACTIVE | Noted: 2019-02-22

## 2019-02-22 LAB
GLUCOSE BLD-MCNC: 149 MG/DL (ref 70–108)
GLUCOSE BLD-MCNC: 215 MG/DL (ref 70–108)
POTASSIUM SERPL-SCNC: 4.3 MEQ/L (ref 3.5–5.2)

## 2019-02-22 PROCEDURE — C1820 GENERATOR NEURO RECHG BAT SY: HCPCS | Performed by: ORTHOPAEDIC SURGERY

## 2019-02-22 PROCEDURE — C1787 PATIENT PROGR, NEUROSTIM: HCPCS | Performed by: ORTHOPAEDIC SURGERY

## 2019-02-22 PROCEDURE — 3700000000 HC ANESTHESIA ATTENDED CARE: Performed by: ORTHOPAEDIC SURGERY

## 2019-02-22 PROCEDURE — 6360000002 HC RX W HCPCS: Performed by: ORTHOPAEDIC SURGERY

## 2019-02-22 PROCEDURE — C1778 LEAD, NEUROSTIMULATOR: HCPCS | Performed by: ORTHOPAEDIC SURGERY

## 2019-02-22 PROCEDURE — 2700000000 HC OXYGEN THERAPY PER DAY

## 2019-02-22 PROCEDURE — 6360000002 HC RX W HCPCS: Performed by: ANESTHESIOLOGY

## 2019-02-22 PROCEDURE — 2500000003 HC RX 250 WO HCPCS: Performed by: ORTHOPAEDIC SURGERY

## 2019-02-22 PROCEDURE — 2780000010 HC IMPLANT OTHER: Performed by: ORTHOPAEDIC SURGERY

## 2019-02-22 PROCEDURE — 7100000001 HC PACU RECOVERY - ADDTL 15 MIN: Performed by: ORTHOPAEDIC SURGERY

## 2019-02-22 PROCEDURE — 3600000005 HC SURGERY LEVEL 5 BASE: Performed by: ORTHOPAEDIC SURGERY

## 2019-02-22 PROCEDURE — 72020 X-RAY EXAM OF SPINE 1 VIEW: CPT

## 2019-02-22 PROCEDURE — 3209999900 FLUORO FOR SURGICAL PROCEDURES

## 2019-02-22 PROCEDURE — 2720000010 HC SURG SUPPLY STERILE: Performed by: ORTHOPAEDIC SURGERY

## 2019-02-22 PROCEDURE — 3700000001 HC ADD 15 MINUTES (ANESTHESIA): Performed by: ORTHOPAEDIC SURGERY

## 2019-02-22 PROCEDURE — 6370000000 HC RX 637 (ALT 250 FOR IP): Performed by: PHYSICIAN ASSISTANT

## 2019-02-22 PROCEDURE — C1713 ANCHOR/SCREW BN/BN,TIS/BN: HCPCS | Performed by: ORTHOPAEDIC SURGERY

## 2019-02-22 PROCEDURE — 2500000003 HC RX 250 WO HCPCS: Performed by: ANESTHESIOLOGY

## 2019-02-22 PROCEDURE — 2580000003 HC RX 258: Performed by: ANESTHESIOLOGY

## 2019-02-22 PROCEDURE — 84132 ASSAY OF SERUM POTASSIUM: CPT

## 2019-02-22 PROCEDURE — 3600000015 HC SURGERY LEVEL 5 ADDTL 15MIN: Performed by: ORTHOPAEDIC SURGERY

## 2019-02-22 PROCEDURE — 82948 REAGENT STRIP/BLOOD GLUCOSE: CPT

## 2019-02-22 PROCEDURE — 36415 COLL VENOUS BLD VENIPUNCTURE: CPT

## 2019-02-22 PROCEDURE — 6360000002 HC RX W HCPCS: Performed by: PHYSICIAN ASSISTANT

## 2019-02-22 PROCEDURE — 2709999900 HC NON-CHARGEABLE SUPPLY: Performed by: ORTHOPAEDIC SURGERY

## 2019-02-22 PROCEDURE — 7100000000 HC PACU RECOVERY - FIRST 15 MIN: Performed by: ORTHOPAEDIC SURGERY

## 2019-02-22 PROCEDURE — 2580000003 HC RX 258: Performed by: PHYSICIAN ASSISTANT

## 2019-02-22 PROCEDURE — 2580000003 HC RX 258: Performed by: ORTHOPAEDIC SURGERY

## 2019-02-22 DEVICE — ANCHOR
Type: IMPLANTABLE DEVICE | Site: SPINE THORACIC | Status: FUNCTIONAL
Brand: CLIK™ X

## 2019-02-22 DEVICE — Z DUP USE 2679261 LEAD NEUROSTIMULATOR SPNL CRD PADDLE STIM SYS SURG EQUIP: Type: IMPLANTABLE DEVICE | Site: SPINE THORACIC | Status: FUNCTIONAL

## 2019-02-22 DEVICE — IMPLANTABLE PULSE GENERATOR KIT
Type: IMPLANTABLE DEVICE | Site: SPINE THORACIC | Status: FUNCTIONAL
Brand: SPECTRA WAVEWRITER™

## 2019-02-22 DEVICE — FLOSEAL HEMOSTATIC MATRIX, 5 ML
Type: IMPLANTABLE DEVICE | Site: SPINE THORACIC | Status: FUNCTIONAL
Brand: FLOSEAL

## 2019-02-22 DEVICE — Z DUP USE 2138772 BAND ANCHR FIX TISS ANULEX 201: Type: IMPLANTABLE DEVICE | Site: SPINE THORACIC | Status: FUNCTIONAL

## 2019-02-22 RX ORDER — DOCUSATE SODIUM 100 MG/1
100 CAPSULE, LIQUID FILLED ORAL 2 TIMES DAILY
Status: DISCONTINUED | OUTPATIENT
Start: 2019-02-22 | End: 2019-02-23 | Stop reason: HOSPADM

## 2019-02-22 RX ORDER — PROMETHAZINE HYDROCHLORIDE 25 MG/ML
INJECTION, SOLUTION INTRAMUSCULAR; INTRAVENOUS PRN
Status: DISCONTINUED | OUTPATIENT
Start: 2019-02-22 | End: 2019-02-22 | Stop reason: SDUPTHER

## 2019-02-22 RX ORDER — VANCOMYCIN HYDROCHLORIDE 1 G/20ML
INJECTION, POWDER, LYOPHILIZED, FOR SOLUTION INTRAVENOUS PRN
Status: DISCONTINUED | OUTPATIENT
Start: 2019-02-22 | End: 2019-02-22 | Stop reason: ALTCHOICE

## 2019-02-22 RX ORDER — METOPROLOL SUCCINATE 25 MG/1
25 TABLET, EXTENDED RELEASE ORAL DAILY
Status: DISCONTINUED | OUTPATIENT
Start: 2019-02-23 | End: 2019-02-23 | Stop reason: HOSPADM

## 2019-02-22 RX ORDER — GLYCOPYRROLATE 1 MG/5 ML
SYRINGE (ML) INTRAVENOUS PRN
Status: DISCONTINUED | OUTPATIENT
Start: 2019-02-22 | End: 2019-02-22 | Stop reason: SDUPTHER

## 2019-02-22 RX ORDER — CEFOXITIN 1 G/1
INJECTION, POWDER, FOR SOLUTION INTRAVENOUS PRN
Status: DISCONTINUED | OUTPATIENT
Start: 2019-02-22 | End: 2019-02-22 | Stop reason: SDUPTHER

## 2019-02-22 RX ORDER — FENTANYL CITRATE 50 UG/ML
INJECTION, SOLUTION INTRAMUSCULAR; INTRAVENOUS PRN
Status: DISCONTINUED | OUTPATIENT
Start: 2019-02-22 | End: 2019-02-22 | Stop reason: SDUPTHER

## 2019-02-22 RX ORDER — PROPOFOL 10 MG/ML
INJECTION, EMULSION INTRAVENOUS PRN
Status: DISCONTINUED | OUTPATIENT
Start: 2019-02-22 | End: 2019-02-22 | Stop reason: SDUPTHER

## 2019-02-22 RX ORDER — SPIRONOLACTONE AND HYDROCHLOROTHIAZIDE 25; 25 MG/1; MG/1
1 TABLET ORAL DAILY
Status: DISCONTINUED | OUTPATIENT
Start: 2019-02-22 | End: 2019-02-22

## 2019-02-22 RX ORDER — SENNA AND DOCUSATE SODIUM 50; 8.6 MG/1; MG/1
1 TABLET, FILM COATED ORAL 2 TIMES DAILY
Status: DISCONTINUED | OUTPATIENT
Start: 2019-02-22 | End: 2019-02-23 | Stop reason: HOSPADM

## 2019-02-22 RX ORDER — FENTANYL CITRATE 50 UG/ML
50 INJECTION, SOLUTION INTRAMUSCULAR; INTRAVENOUS EVERY 5 MIN PRN
Status: DISCONTINUED | OUTPATIENT
Start: 2019-02-22 | End: 2019-02-22 | Stop reason: HOSPADM

## 2019-02-22 RX ORDER — SODIUM CHLORIDE 0.9 % (FLUSH) 0.9 %
10 SYRINGE (ML) INJECTION PRN
Status: DISCONTINUED | OUTPATIENT
Start: 2019-02-22 | End: 2019-02-22 | Stop reason: HOSPADM

## 2019-02-22 RX ORDER — LABETALOL HYDROCHLORIDE 5 MG/ML
5 INJECTION, SOLUTION INTRAVENOUS EVERY 10 MIN PRN
Status: DISCONTINUED | OUTPATIENT
Start: 2019-02-22 | End: 2019-02-22 | Stop reason: HOSPADM

## 2019-02-22 RX ORDER — SPIRONOLACTONE 25 MG/1
25 TABLET ORAL DAILY
Status: DISCONTINUED | OUTPATIENT
Start: 2019-02-22 | End: 2019-02-23 | Stop reason: HOSPADM

## 2019-02-22 RX ORDER — SODIUM CHLORIDE 0.9 % (FLUSH) 0.9 %
10 SYRINGE (ML) INJECTION EVERY 12 HOURS SCHEDULED
Status: DISCONTINUED | OUTPATIENT
Start: 2019-02-22 | End: 2019-02-22 | Stop reason: HOSPADM

## 2019-02-22 RX ORDER — MIDAZOLAM HYDROCHLORIDE 1 MG/ML
INJECTION INTRAMUSCULAR; INTRAVENOUS PRN
Status: DISCONTINUED | OUTPATIENT
Start: 2019-02-22 | End: 2019-02-22 | Stop reason: SDUPTHER

## 2019-02-22 RX ORDER — SODIUM CHLORIDE 0.9 % (FLUSH) 0.9 %
10 SYRINGE (ML) INJECTION PRN
Status: DISCONTINUED | OUTPATIENT
Start: 2019-02-22 | End: 2019-02-23 | Stop reason: HOSPADM

## 2019-02-22 RX ORDER — CYCLOBENZAPRINE HCL 10 MG
10 TABLET ORAL 3 TIMES DAILY PRN
Status: DISCONTINUED | OUTPATIENT
Start: 2019-02-22 | End: 2019-02-23 | Stop reason: HOSPADM

## 2019-02-22 RX ORDER — SODIUM CHLORIDE 9 MG/ML
INJECTION, SOLUTION INTRAVENOUS CONTINUOUS
Status: DISCONTINUED | OUTPATIENT
Start: 2019-02-22 | End: 2019-02-22

## 2019-02-22 RX ORDER — MEPERIDINE HYDROCHLORIDE 25 MG/ML
12.5 INJECTION INTRAMUSCULAR; INTRAVENOUS; SUBCUTANEOUS EVERY 5 MIN PRN
Status: DISCONTINUED | OUTPATIENT
Start: 2019-02-22 | End: 2019-02-22 | Stop reason: HOSPADM

## 2019-02-22 RX ORDER — SODIUM CHLORIDE 9 MG/ML
INJECTION, SOLUTION INTRAVENOUS CONTINUOUS PRN
Status: DISCONTINUED | OUTPATIENT
Start: 2019-02-22 | End: 2019-02-22 | Stop reason: SDUPTHER

## 2019-02-22 RX ORDER — ROCURONIUM BROMIDE 10 MG/ML
INJECTION, SOLUTION INTRAVENOUS PRN
Status: DISCONTINUED | OUTPATIENT
Start: 2019-02-22 | End: 2019-02-22 | Stop reason: SDUPTHER

## 2019-02-22 RX ORDER — ESCITALOPRAM OXALATE 10 MG/1
10 TABLET ORAL DAILY
Status: DISCONTINUED | OUTPATIENT
Start: 2019-02-22 | End: 2019-02-23 | Stop reason: HOSPADM

## 2019-02-22 RX ORDER — ONDANSETRON 2 MG/ML
INJECTION INTRAMUSCULAR; INTRAVENOUS PRN
Status: DISCONTINUED | OUTPATIENT
Start: 2019-02-22 | End: 2019-02-22 | Stop reason: SDUPTHER

## 2019-02-22 RX ORDER — SODIUM CHLORIDE 9 MG/ML
INJECTION, SOLUTION INTRAVENOUS CONTINUOUS
Status: DISCONTINUED | OUTPATIENT
Start: 2019-02-22 | End: 2019-02-23 | Stop reason: HOSPADM

## 2019-02-22 RX ORDER — MORPHINE SULFATE 2 MG/ML
2 INJECTION, SOLUTION INTRAMUSCULAR; INTRAVENOUS
Status: DISCONTINUED | OUTPATIENT
Start: 2019-02-22 | End: 2019-02-23 | Stop reason: HOSPADM

## 2019-02-22 RX ORDER — PANTOPRAZOLE SODIUM 40 MG/1
40 TABLET, DELAYED RELEASE ORAL DAILY
Status: DISCONTINUED | OUTPATIENT
Start: 2019-02-23 | End: 2019-02-23 | Stop reason: HOSPADM

## 2019-02-22 RX ORDER — NEOSTIGMINE METHYLSULFATE 1 MG/ML
INJECTION, SOLUTION INTRAVENOUS PRN
Status: DISCONTINUED | OUTPATIENT
Start: 2019-02-22 | End: 2019-02-22 | Stop reason: SDUPTHER

## 2019-02-22 RX ORDER — OXYCODONE HYDROCHLORIDE AND ACETAMINOPHEN 5; 325 MG/1; MG/1
2 TABLET ORAL EVERY 4 HOURS PRN
Status: DISCONTINUED | OUTPATIENT
Start: 2019-02-22 | End: 2019-02-23 | Stop reason: HOSPADM

## 2019-02-22 RX ORDER — MORPHINE SULFATE 4 MG/ML
4 INJECTION, SOLUTION INTRAMUSCULAR; INTRAVENOUS
Status: DISCONTINUED | OUTPATIENT
Start: 2019-02-22 | End: 2019-02-23 | Stop reason: HOSPADM

## 2019-02-22 RX ORDER — SUCCINYLCHOLINE CHLORIDE 20 MG/ML
INJECTION INTRAMUSCULAR; INTRAVENOUS PRN
Status: DISCONTINUED | OUTPATIENT
Start: 2019-02-22 | End: 2019-02-22 | Stop reason: SDUPTHER

## 2019-02-22 RX ORDER — OXYCODONE HYDROCHLORIDE AND ACETAMINOPHEN 5; 325 MG/1; MG/1
1 TABLET ORAL EVERY 4 HOURS PRN
Status: DISCONTINUED | OUTPATIENT
Start: 2019-02-22 | End: 2019-02-23 | Stop reason: HOSPADM

## 2019-02-22 RX ORDER — HYDROCHLOROTHIAZIDE 25 MG/1
25 TABLET ORAL DAILY
Status: DISCONTINUED | OUTPATIENT
Start: 2019-02-22 | End: 2019-02-23 | Stop reason: HOSPADM

## 2019-02-22 RX ORDER — POLYETHYLENE GLYCOL 3350 17 G/17G
17 POWDER, FOR SOLUTION ORAL DAILY
Status: DISCONTINUED | OUTPATIENT
Start: 2019-02-22 | End: 2019-02-23 | Stop reason: HOSPADM

## 2019-02-22 RX ORDER — ONDANSETRON 2 MG/ML
4 INJECTION INTRAMUSCULAR; INTRAVENOUS
Status: DISCONTINUED | OUTPATIENT
Start: 2019-02-22 | End: 2019-02-22 | Stop reason: HOSPADM

## 2019-02-22 RX ORDER — SODIUM CHLORIDE 0.9 % (FLUSH) 0.9 %
10 SYRINGE (ML) INJECTION EVERY 12 HOURS SCHEDULED
Status: DISCONTINUED | OUTPATIENT
Start: 2019-02-22 | End: 2019-02-23 | Stop reason: HOSPADM

## 2019-02-22 RX ORDER — ONDANSETRON 2 MG/ML
4 INJECTION INTRAMUSCULAR; INTRAVENOUS EVERY 6 HOURS PRN
Status: DISCONTINUED | OUTPATIENT
Start: 2019-02-22 | End: 2019-02-23 | Stop reason: HOSPADM

## 2019-02-22 RX ADMIN — SUCCINYLCHOLINE CHLORIDE 180 MG: 20 INJECTION, SOLUTION INTRAMUSCULAR; INTRAVENOUS at 08:24

## 2019-02-22 RX ADMIN — ROCURONIUM BROMIDE 20 MG: 10 INJECTION INTRAVENOUS at 08:32

## 2019-02-22 RX ADMIN — SODIUM CHLORIDE: 9 INJECTION, SOLUTION INTRAVENOUS at 10:01

## 2019-02-22 RX ADMIN — MORPHINE SULFATE 2 MG: 2 INJECTION, SOLUTION INTRAMUSCULAR; INTRAVENOUS at 16:25

## 2019-02-22 RX ADMIN — DOCUSATE SODIUM 100 MG: 100 CAPSULE, LIQUID FILLED ORAL at 20:30

## 2019-02-22 RX ADMIN — OXYCODONE AND ACETAMINOPHEN 2 TABLET: 5; 325 TABLET ORAL at 14:39

## 2019-02-22 RX ADMIN — FENTANYL CITRATE 50 MCG: 50 INJECTION INTRAMUSCULAR; INTRAVENOUS at 09:54

## 2019-02-22 RX ADMIN — SODIUM CHLORIDE: 9 INJECTION, SOLUTION INTRAVENOUS at 08:20

## 2019-02-22 RX ADMIN — DOCUSATE SODIUM 100 MG: 100 CAPSULE, LIQUID FILLED ORAL at 14:44

## 2019-02-22 RX ADMIN — PROMETHAZINE HYDROCHLORIDE 25 MG: 25 INJECTION INTRAMUSCULAR; INTRAVENOUS at 08:39

## 2019-02-22 RX ADMIN — NEOSTIGMINE METHYLSULFATE 3 MG: 1 INJECTION, SOLUTION INTRAVENOUS at 09:54

## 2019-02-22 RX ADMIN — OXYCODONE AND ACETAMINOPHEN 1 TABLET: 5; 325 TABLET ORAL at 20:36

## 2019-02-22 RX ADMIN — MIDAZOLAM HYDROCHLORIDE 2 MG: 1 INJECTION, SOLUTION INTRAMUSCULAR; INTRAVENOUS at 08:22

## 2019-02-22 RX ADMIN — POLYETHYLENE GLYCOL 3350 17 G: 17 POWDER, FOR SOLUTION ORAL at 14:46

## 2019-02-22 RX ADMIN — SODIUM CHLORIDE: 9 INJECTION, SOLUTION INTRAVENOUS at 19:21

## 2019-02-22 RX ADMIN — PROPOFOL 180 MG: 10 INJECTION, EMULSION INTRAVENOUS at 08:24

## 2019-02-22 RX ADMIN — CEFOXITIN 2 G: 1 INJECTION, POWDER, FOR SOLUTION INTRAVENOUS at 08:30

## 2019-02-22 RX ADMIN — DEXTROSE MONOHYDRATE 2 G: 50 INJECTION, SOLUTION INTRAVENOUS at 16:31

## 2019-02-22 RX ADMIN — FENTANYL CITRATE 50 MCG: 50 INJECTION INTRAMUSCULAR; INTRAVENOUS at 08:30

## 2019-02-22 RX ADMIN — FENTANYL CITRATE 50 MCG: 50 INJECTION INTRAMUSCULAR; INTRAVENOUS at 09:51

## 2019-02-22 RX ADMIN — HYDROCHLOROTHIAZIDE 25 MG: 25 TABLET ORAL at 14:44

## 2019-02-22 RX ADMIN — SENNOSIDES AND DOCUSATE SODIUM 1 TABLET: 8.6; 5 TABLET ORAL at 20:29

## 2019-02-22 RX ADMIN — MORPHINE SULFATE 4 MG: 4 INJECTION INTRAVENOUS at 13:33

## 2019-02-22 RX ADMIN — METFORMIN HYDROCHLORIDE 500 MG: 500 TABLET ORAL at 14:44

## 2019-02-22 RX ADMIN — SPIRONOLACTONE 25 MG: 25 TABLET, FILM COATED ORAL at 14:44

## 2019-02-22 RX ADMIN — MORPHINE SULFATE 2 MG: 2 INJECTION, SOLUTION INTRAMUSCULAR; INTRAVENOUS at 11:19

## 2019-02-22 RX ADMIN — ROCURONIUM BROMIDE 10 MG: 10 INJECTION INTRAVENOUS at 08:24

## 2019-02-22 RX ADMIN — MORPHINE SULFATE 2 MG: 2 INJECTION, SOLUTION INTRAMUSCULAR; INTRAVENOUS at 19:15

## 2019-02-22 RX ADMIN — SENNOSIDES AND DOCUSATE SODIUM 1 TABLET: 8.6; 5 TABLET ORAL at 14:44

## 2019-02-22 RX ADMIN — METFORMIN HYDROCHLORIDE 500 MG: 500 TABLET ORAL at 16:31

## 2019-02-22 RX ADMIN — CYCLOBENZAPRINE HYDROCHLORIDE 10 MG: 10 TABLET, FILM COATED ORAL at 12:15

## 2019-02-22 RX ADMIN — ONDANSETRON HYDROCHLORIDE 4 MG: 4 INJECTION, SOLUTION INTRAMUSCULAR; INTRAVENOUS at 09:26

## 2019-02-22 RX ADMIN — SODIUM CHLORIDE: 9 INJECTION, SOLUTION INTRAVENOUS at 07:37

## 2019-02-22 RX ADMIN — FENTANYL CITRATE 100 MCG: 50 INJECTION INTRAMUSCULAR; INTRAVENOUS at 08:24

## 2019-02-22 RX ADMIN — Medication 0.2 MG: at 09:54

## 2019-02-22 RX ADMIN — ESCITALOPRAM OXALATE 10 MG: 10 TABLET, FILM COATED ORAL at 14:44

## 2019-02-22 ASSESSMENT — PAIN DESCRIPTION - ORIENTATION
ORIENTATION: UPPER
ORIENTATION: UPPER

## 2019-02-22 ASSESSMENT — PAIN SCALES - GENERAL
PAINLEVEL_OUTOF10: 9
PAINLEVEL_OUTOF10: 5
PAINLEVEL_OUTOF10: 4
PAINLEVEL_OUTOF10: 9
PAINLEVEL_OUTOF10: 5
PAINLEVEL_OUTOF10: 5
PAINLEVEL_OUTOF10: 8
PAINLEVEL_OUTOF10: 9
PAINLEVEL_OUTOF10: 4
PAINLEVEL_OUTOF10: 5
PAINLEVEL_OUTOF10: 7
PAINLEVEL_OUTOF10: 10
PAINLEVEL_OUTOF10: 10

## 2019-02-22 ASSESSMENT — PULMONARY FUNCTION TESTS
PIF_VALUE: 19
PIF_VALUE: 1
PIF_VALUE: 10
PIF_VALUE: 21
PIF_VALUE: 19
PIF_VALUE: 21
PIF_VALUE: 21
PIF_VALUE: 19
PIF_VALUE: 20
PIF_VALUE: 19
PIF_VALUE: 20
PIF_VALUE: 15
PIF_VALUE: 18
PIF_VALUE: 19
PIF_VALUE: 17
PIF_VALUE: 29
PIF_VALUE: 16
PIF_VALUE: 19
PIF_VALUE: 2
PIF_VALUE: 21
PIF_VALUE: 16
PIF_VALUE: 19
PIF_VALUE: 20
PIF_VALUE: 20
PIF_VALUE: 18
PIF_VALUE: 21
PIF_VALUE: 19
PIF_VALUE: 21
PIF_VALUE: 17
PIF_VALUE: 20
PIF_VALUE: 27
PIF_VALUE: 21
PIF_VALUE: 21
PIF_VALUE: 16
PIF_VALUE: 20
PIF_VALUE: 22
PIF_VALUE: 1
PIF_VALUE: 16
PIF_VALUE: 21
PIF_VALUE: 20
PIF_VALUE: 19
PIF_VALUE: 17
PIF_VALUE: 10
PIF_VALUE: 20
PIF_VALUE: 16
PIF_VALUE: 35
PIF_VALUE: 19
PIF_VALUE: 23
PIF_VALUE: 19
PIF_VALUE: 19
PIF_VALUE: 20
PIF_VALUE: 20
PIF_VALUE: 19
PIF_VALUE: 21
PIF_VALUE: 20
PIF_VALUE: 19
PIF_VALUE: 4
PIF_VALUE: 19
PIF_VALUE: 20
PIF_VALUE: 21
PIF_VALUE: 19
PIF_VALUE: 20
PIF_VALUE: 21
PIF_VALUE: 1
PIF_VALUE: 19
PIF_VALUE: 21
PIF_VALUE: 15
PIF_VALUE: 0
PIF_VALUE: 19
PIF_VALUE: 21
PIF_VALUE: 20
PIF_VALUE: 21
PIF_VALUE: 21
PIF_VALUE: 15
PIF_VALUE: 2
PIF_VALUE: 19
PIF_VALUE: 19
PIF_VALUE: 18
PIF_VALUE: 1
PIF_VALUE: 19
PIF_VALUE: 19
PIF_VALUE: 21
PIF_VALUE: 21
PIF_VALUE: 18
PIF_VALUE: 19

## 2019-02-22 ASSESSMENT — PAIN DESCRIPTION - DESCRIPTORS
DESCRIPTORS: ACHING;STABBING
DESCRIPTORS: ACHING
DESCRIPTORS: ACHING

## 2019-02-22 ASSESSMENT — PAIN DESCRIPTION - PAIN TYPE
TYPE: SURGICAL PAIN
TYPE: SURGICAL PAIN

## 2019-02-22 ASSESSMENT — PAIN DESCRIPTION - LOCATION
LOCATION: BACK
LOCATION: BACK

## 2019-02-22 ASSESSMENT — PAIN - FUNCTIONAL ASSESSMENT: PAIN_FUNCTIONAL_ASSESSMENT: 0-10

## 2019-02-23 VITALS
SYSTOLIC BLOOD PRESSURE: 132 MMHG | RESPIRATION RATE: 18 BRPM | HEART RATE: 78 BPM | BODY MASS INDEX: 40.33 KG/M2 | DIASTOLIC BLOOD PRESSURE: 63 MMHG | OXYGEN SATURATION: 99 % | WEIGHT: 242.06 LBS | HEIGHT: 65 IN | TEMPERATURE: 96 F

## 2019-02-23 PROCEDURE — 6370000000 HC RX 637 (ALT 250 FOR IP): Performed by: PHYSICIAN ASSISTANT

## 2019-02-23 PROCEDURE — 96360 HYDRATION IV INFUSION INIT: CPT

## 2019-02-23 PROCEDURE — 96365 THER/PROPH/DIAG IV INF INIT: CPT

## 2019-02-23 PROCEDURE — 96368 THER/DIAG CONCURRENT INF: CPT

## 2019-02-23 PROCEDURE — 96376 TX/PRO/DX INJ SAME DRUG ADON: CPT

## 2019-02-23 PROCEDURE — 96361 HYDRATE IV INFUSION ADD-ON: CPT

## 2019-02-23 PROCEDURE — 96374 THER/PROPH/DIAG INJ IV PUSH: CPT

## 2019-02-23 PROCEDURE — 6360000002 HC RX W HCPCS: Performed by: PHYSICIAN ASSISTANT

## 2019-02-23 PROCEDURE — 2580000003 HC RX 258: Performed by: PHYSICIAN ASSISTANT

## 2019-02-23 RX ADMIN — OXYCODONE AND ACETAMINOPHEN 2 TABLET: 5; 325 TABLET ORAL at 01:02

## 2019-02-23 RX ADMIN — OXYCODONE AND ACETAMINOPHEN 2 TABLET: 5; 325 TABLET ORAL at 08:10

## 2019-02-23 RX ADMIN — DEXTROSE MONOHYDRATE 2 G: 50 INJECTION, SOLUTION INTRAVENOUS at 01:01

## 2019-02-23 ASSESSMENT — PAIN SCALES - GENERAL
PAINLEVEL_OUTOF10: 7
PAINLEVEL_OUTOF10: 5
PAINLEVEL_OUTOF10: 7

## 2019-02-23 ASSESSMENT — PAIN DESCRIPTION - LOCATION
LOCATION: BACK
LOCATION: SHOULDER;BACK
LOCATION: BACK;SHOULDER

## 2019-02-23 ASSESSMENT — PAIN DESCRIPTION - PAIN TYPE
TYPE: SURGICAL PAIN
TYPE: SURGICAL PAIN

## 2019-02-23 ASSESSMENT — PAIN DESCRIPTION - DESCRIPTORS: DESCRIPTORS: ACHING

## 2019-02-23 ASSESSMENT — PAIN DESCRIPTION - ORIENTATION
ORIENTATION: LEFT;UPPER
ORIENTATION: LEFT;UPPER

## 2019-02-28 ENCOUNTER — NURSE TRIAGE (OUTPATIENT)
Dept: ADMINISTRATIVE | Age: 54
End: 2019-02-28

## 2019-03-20 ENCOUNTER — APPOINTMENT (OUTPATIENT)
Dept: GENERAL RADIOLOGY | Age: 54
DRG: 041 | End: 2019-03-20
Payer: COMMERCIAL

## 2019-03-20 ENCOUNTER — HOSPITAL ENCOUNTER (INPATIENT)
Age: 54
LOS: 2 days | Discharge: HOME | DRG: 041 | End: 2019-03-22
Attending: ORTHOPAEDIC SURGERY | Admitting: ORTHOPAEDIC SURGERY
Payer: COMMERCIAL

## 2019-03-20 ENCOUNTER — APPOINTMENT (OUTPATIENT)
Dept: CT IMAGING | Age: 54
DRG: 041 | End: 2019-03-20
Payer: COMMERCIAL

## 2019-03-20 DIAGNOSIS — L02.212 ABSCESS OF BACK: Primary | ICD-10-CM

## 2019-03-20 DIAGNOSIS — T85.733A INFECTION OF SPINAL CORD STIMULATOR, INITIAL ENCOUNTER (HCC): ICD-10-CM

## 2019-03-20 LAB
ALBUMIN SERPL-MCNC: 4 G/DL (ref 3.5–5.1)
ALP BLD-CCNC: 87 U/L (ref 38–126)
ALT SERPL-CCNC: 27 U/L (ref 11–66)
ANION GAP SERPL CALCULATED.3IONS-SCNC: 14 MEQ/L (ref 8–16)
AST SERPL-CCNC: 19 U/L (ref 5–40)
BACTERIA: ABNORMAL /HPF
BASOPHILS # BLD: 0.3 %
BASOPHILS ABSOLUTE: 0 THOU/MM3 (ref 0–0.1)
BILIRUB SERPL-MCNC: 0.6 MG/DL (ref 0.3–1.2)
BILIRUBIN URINE: NEGATIVE
BLOOD, URINE: ABNORMAL
BUN BLDV-MCNC: 17 MG/DL (ref 7–22)
CALCIUM SERPL-MCNC: 9.6 MG/DL (ref 8.5–10.5)
CASTS 2: ABNORMAL /LPF
CASTS UA: ABNORMAL /LPF
CHARACTER, URINE: CLEAR
CHLORIDE BLD-SCNC: 92 MEQ/L (ref 98–111)
CO2: 25 MEQ/L (ref 23–33)
COLOR: YELLOW
CREAT SERPL-MCNC: 1.1 MG/DL (ref 0.4–1.2)
CRYSTALS, UA: ABNORMAL
EKG ATRIAL RATE: 98 BPM
EKG P AXIS: 29 DEGREES
EKG P-R INTERVAL: 150 MS
EKG Q-T INTERVAL: 338 MS
EKG QRS DURATION: 80 MS
EKG QTC CALCULATION (BAZETT): 431 MS
EKG R AXIS: 25 DEGREES
EKG T AXIS: 40 DEGREES
EKG VENTRICULAR RATE: 98 BPM
EOSINOPHIL # BLD: 1.1 %
EOSINOPHILS ABSOLUTE: 0.2 THOU/MM3 (ref 0–0.4)
EPITHELIAL CELLS, UA: ABNORMAL /HPF
ERYTHROCYTE [DISTWIDTH] IN BLOOD BY AUTOMATED COUNT: 13.4 % (ref 11.5–14.5)
ERYTHROCYTE [DISTWIDTH] IN BLOOD BY AUTOMATED COUNT: 41.7 FL (ref 35–45)
FLU A ANTIGEN: NEGATIVE
FLU B ANTIGEN: NEGATIVE
GFR SERPL CREATININE-BSD FRML MDRD: 52 ML/MIN/1.73M2
GLUCOSE BLD-MCNC: 177 MG/DL (ref 70–108)
GLUCOSE URINE: NEGATIVE MG/DL
HCT VFR BLD CALC: 38.8 % (ref 37–47)
HEMOGLOBIN: 12.7 GM/DL (ref 12–16)
IMMATURE GRANS (ABS): 0.1 THOU/MM3 (ref 0–0.07)
IMMATURE GRANULOCYTES: 0.6 %
KETONES, URINE: NEGATIVE
LACTIC ACID: 1.2 MMOL/L (ref 0.5–2.2)
LEUKOCYTE ESTERASE, URINE: NEGATIVE
LYMPHOCYTES # BLD: 5.1 %
LYMPHOCYTES ABSOLUTE: 0.8 THOU/MM3 (ref 1–4.8)
MCH RBC QN AUTO: 28 PG (ref 26–33)
MCHC RBC AUTO-ENTMCNC: 32.7 GM/DL (ref 32.2–35.5)
MCV RBC AUTO: 85.5 FL (ref 81–99)
MISCELLANEOUS 2: ABNORMAL
MONOCYTES # BLD: 9.7 %
MONOCYTES ABSOLUTE: 1.6 THOU/MM3 (ref 0.4–1.3)
NITRITE, URINE: NEGATIVE
NUCLEATED RED BLOOD CELLS: 0 /100 WBC
OSMOLALITY CALCULATION: 268.6 MOSMOL/KG (ref 275–300)
PH UA: 5.5 (ref 5–9)
PLATELET # BLD: 318 THOU/MM3 (ref 130–400)
PMV BLD AUTO: 10.9 FL (ref 9.4–12.4)
POTASSIUM SERPL-SCNC: 3.7 MEQ/L (ref 3.5–5.2)
PREGNANCY, SERUM: NEGATIVE
PROTEIN UA: ABNORMAL
RBC # BLD: 4.54 MILL/MM3 (ref 4.2–5.4)
RBC URINE: ABNORMAL /HPF
RENAL EPITHELIAL, UA: ABNORMAL
SEG NEUTROPHILS: 83.2 %
SEGMENTED NEUTROPHILS ABSOLUTE COUNT: 13.6 THOU/MM3 (ref 1.8–7.7)
SODIUM BLD-SCNC: 131 MEQ/L (ref 135–145)
SPECIFIC GRAVITY, URINE: 1.02 (ref 1–1.03)
TOTAL PROTEIN: 8 G/DL (ref 6.1–8)
UROBILINOGEN, URINE: 1 EU/DL (ref 0–1)
WBC # BLD: 16.4 THOU/MM3 (ref 4.8–10.8)
WBC UA: ABNORMAL /HPF
YEAST: ABNORMAL

## 2019-03-20 PROCEDURE — 99285 EMERGENCY DEPT VISIT HI MDM: CPT

## 2019-03-20 PROCEDURE — 6370000000 HC RX 637 (ALT 250 FOR IP): Performed by: PHYSICIAN ASSISTANT

## 2019-03-20 PROCEDURE — 2580000003 HC RX 258: Performed by: PHYSICIAN ASSISTANT

## 2019-03-20 PROCEDURE — 1200000000 HC SEMI PRIVATE

## 2019-03-20 PROCEDURE — 87186 SC STD MICRODIL/AGAR DIL: CPT

## 2019-03-20 PROCEDURE — 6360000002 HC RX W HCPCS: Performed by: INTERNAL MEDICINE

## 2019-03-20 PROCEDURE — 6360000002 HC RX W HCPCS: Performed by: PHYSICIAN ASSISTANT

## 2019-03-20 PROCEDURE — 83605 ASSAY OF LACTIC ACID: CPT

## 2019-03-20 PROCEDURE — 87040 BLOOD CULTURE FOR BACTERIA: CPT

## 2019-03-20 PROCEDURE — 87070 CULTURE OTHR SPECIMN AEROBIC: CPT

## 2019-03-20 PROCEDURE — 85025 COMPLETE CBC W/AUTO DIFF WBC: CPT

## 2019-03-20 PROCEDURE — 71046 X-RAY EXAM CHEST 2 VIEWS: CPT

## 2019-03-20 PROCEDURE — 36415 COLL VENOUS BLD VENIPUNCTURE: CPT

## 2019-03-20 PROCEDURE — 96374 THER/PROPH/DIAG INJ IV PUSH: CPT

## 2019-03-20 PROCEDURE — 87804 INFLUENZA ASSAY W/OPTIC: CPT

## 2019-03-20 PROCEDURE — 93010 ELECTROCARDIOGRAM REPORT: CPT | Performed by: INTERNAL MEDICINE

## 2019-03-20 PROCEDURE — 84703 CHORIONIC GONADOTROPIN ASSAY: CPT

## 2019-03-20 PROCEDURE — 87077 CULTURE AEROBIC IDENTIFY: CPT

## 2019-03-20 PROCEDURE — 93005 ELECTROCARDIOGRAM TRACING: CPT | Performed by: PHYSICIAN ASSISTANT

## 2019-03-20 PROCEDURE — 2709999900 HC NON-CHARGEABLE SUPPLY

## 2019-03-20 PROCEDURE — 81001 URINALYSIS AUTO W/SCOPE: CPT

## 2019-03-20 PROCEDURE — 87147 CULTURE TYPE IMMUNOLOGIC: CPT

## 2019-03-20 PROCEDURE — 72128 CT CHEST SPINE W/O DYE: CPT

## 2019-03-20 PROCEDURE — 80053 COMPREHEN METABOLIC PANEL: CPT

## 2019-03-20 PROCEDURE — 87075 CULTR BACTERIA EXCEPT BLOOD: CPT

## 2019-03-20 PROCEDURE — 87205 SMEAR GRAM STAIN: CPT

## 2019-03-20 PROCEDURE — 2500000003 HC RX 250 WO HCPCS: Performed by: PHYSICIAN ASSISTANT

## 2019-03-20 RX ORDER — SODIUM CHLORIDE 9 MG/ML
INJECTION, SOLUTION INTRAVENOUS CONTINUOUS
Status: DISCONTINUED | OUTPATIENT
Start: 2019-03-20 | End: 2019-03-21

## 2019-03-20 RX ORDER — OXYCODONE HYDROCHLORIDE AND ACETAMINOPHEN 5; 325 MG/1; MG/1
2 TABLET ORAL EVERY 4 HOURS PRN
Status: DISCONTINUED | OUTPATIENT
Start: 2019-03-20 | End: 2019-03-21

## 2019-03-20 RX ORDER — BUPROPION HYDROCHLORIDE 75 MG/1
150 TABLET ORAL 2 TIMES DAILY
Status: ON HOLD | COMMUNITY
End: 2019-09-05

## 2019-03-20 RX ORDER — SENNA AND DOCUSATE SODIUM 50; 8.6 MG/1; MG/1
1 TABLET, FILM COATED ORAL 2 TIMES DAILY
Status: DISCONTINUED | OUTPATIENT
Start: 2019-03-20 | End: 2019-03-21

## 2019-03-20 RX ORDER — ONDANSETRON 2 MG/ML
4 INJECTION INTRAMUSCULAR; INTRAVENOUS ONCE
Status: COMPLETED | OUTPATIENT
Start: 2019-03-20 | End: 2019-03-20

## 2019-03-20 RX ORDER — FENTANYL CITRATE 50 UG/ML
50 INJECTION, SOLUTION INTRAMUSCULAR; INTRAVENOUS ONCE
Status: COMPLETED | OUTPATIENT
Start: 2019-03-20 | End: 2019-03-20

## 2019-03-20 RX ORDER — SODIUM CHLORIDE 0.9 % (FLUSH) 0.9 %
10 SYRINGE (ML) INJECTION EVERY 12 HOURS SCHEDULED
Status: DISCONTINUED | OUTPATIENT
Start: 2019-03-20 | End: 2019-03-21

## 2019-03-20 RX ORDER — MORPHINE SULFATE 2 MG/ML
2 INJECTION, SOLUTION INTRAMUSCULAR; INTRAVENOUS ONCE
Status: DISCONTINUED | OUTPATIENT
Start: 2019-03-20 | End: 2019-03-20

## 2019-03-20 RX ORDER — DOCUSATE SODIUM 100 MG/1
100 CAPSULE, LIQUID FILLED ORAL 2 TIMES DAILY
Status: DISCONTINUED | OUTPATIENT
Start: 2019-03-20 | End: 2019-03-21

## 2019-03-20 RX ORDER — MORPHINE SULFATE 2 MG/ML
1 INJECTION, SOLUTION INTRAMUSCULAR; INTRAVENOUS ONCE
Status: COMPLETED | OUTPATIENT
Start: 2019-03-20 | End: 2019-03-20

## 2019-03-20 RX ORDER — 0.9 % SODIUM CHLORIDE 0.9 %
1000 INTRAVENOUS SOLUTION INTRAVENOUS ONCE
Status: COMPLETED | OUTPATIENT
Start: 2019-03-20 | End: 2019-03-20

## 2019-03-20 RX ORDER — CYCLOBENZAPRINE HCL 10 MG
10 TABLET ORAL 3 TIMES DAILY PRN
Status: DISCONTINUED | OUTPATIENT
Start: 2019-03-20 | End: 2019-03-20

## 2019-03-20 RX ORDER — SODIUM CHLORIDE 0.9 % (FLUSH) 0.9 %
10 SYRINGE (ML) INJECTION PRN
Status: DISCONTINUED | OUTPATIENT
Start: 2019-03-20 | End: 2019-03-21

## 2019-03-20 RX ORDER — MORPHINE SULFATE 2 MG/ML
2 INJECTION, SOLUTION INTRAMUSCULAR; INTRAVENOUS
Status: DISCONTINUED | OUTPATIENT
Start: 2019-03-20 | End: 2019-03-21

## 2019-03-20 RX ORDER — MORPHINE SULFATE 4 MG/ML
4 INJECTION, SOLUTION INTRAMUSCULAR; INTRAVENOUS
Status: DISCONTINUED | OUTPATIENT
Start: 2019-03-20 | End: 2019-03-21

## 2019-03-20 RX ORDER — LINEZOLID 2 MG/ML
600 INJECTION, SOLUTION INTRAVENOUS EVERY 12 HOURS
Status: DISCONTINUED | OUTPATIENT
Start: 2019-03-20 | End: 2019-03-22 | Stop reason: HOSPADM

## 2019-03-20 RX ORDER — OXYCODONE HYDROCHLORIDE AND ACETAMINOPHEN 5; 325 MG/1; MG/1
1 TABLET ORAL EVERY 4 HOURS PRN
Status: DISCONTINUED | OUTPATIENT
Start: 2019-03-20 | End: 2019-03-21

## 2019-03-20 RX ORDER — ACETAMINOPHEN 325 MG/1
650 TABLET ORAL EVERY 4 HOURS PRN
Status: DISCONTINUED | OUTPATIENT
Start: 2019-03-20 | End: 2019-03-21

## 2019-03-20 RX ORDER — ONDANSETRON 2 MG/ML
4 INJECTION INTRAMUSCULAR; INTRAVENOUS EVERY 6 HOURS PRN
Status: DISCONTINUED | OUTPATIENT
Start: 2019-03-20 | End: 2019-03-21

## 2019-03-20 RX ADMIN — DOCUSATE SODIUM 100 MG: 100 CAPSULE, LIQUID FILLED ORAL at 20:27

## 2019-03-20 RX ADMIN — SODIUM CHLORIDE: 9 INJECTION, SOLUTION INTRAVENOUS at 12:56

## 2019-03-20 RX ADMIN — ONDANSETRON 4 MG: 2 INJECTION INTRAMUSCULAR; INTRAVENOUS at 10:39

## 2019-03-20 RX ADMIN — CEFEPIME 2 G: 2 INJECTION, POWDER, FOR SOLUTION INTRAVENOUS at 14:31

## 2019-03-20 RX ADMIN — MORPHINE SULFATE 2 MG: 2 INJECTION, SOLUTION INTRAMUSCULAR; INTRAVENOUS at 12:46

## 2019-03-20 RX ADMIN — MORPHINE SULFATE 2 MG: 2 INJECTION, SOLUTION INTRAMUSCULAR; INTRAVENOUS at 22:28

## 2019-03-20 RX ADMIN — VANCOMYCIN HYDROCHLORIDE 1500 MG: 5 INJECTION, POWDER, LYOPHILIZED, FOR SOLUTION INTRAVENOUS at 10:44

## 2019-03-20 RX ADMIN — MORPHINE SULFATE 4 MG: 4 INJECTION, SOLUTION INTRAMUSCULAR; INTRAVENOUS at 20:28

## 2019-03-20 RX ADMIN — FENTANYL CITRATE 50 MCG: 50 INJECTION, SOLUTION INTRAMUSCULAR; INTRAVENOUS at 10:01

## 2019-03-20 RX ADMIN — MORPHINE SULFATE 1 MG: 2 INJECTION, SOLUTION INTRAMUSCULAR; INTRAVENOUS at 10:39

## 2019-03-20 RX ADMIN — DOCUSATE SODIUM AND SENNOSIDES 1 TABLET: 8.6; 5 TABLET, FILM COATED ORAL at 20:28

## 2019-03-20 RX ADMIN — LINEZOLID 600 MG: 600 INJECTION, SOLUTION INTRAVENOUS at 16:25

## 2019-03-20 RX ADMIN — SODIUM CHLORIDE 1000 ML: 9 INJECTION, SOLUTION INTRAVENOUS at 09:52

## 2019-03-20 RX ADMIN — MORPHINE SULFATE 4 MG: 4 INJECTION, SOLUTION INTRAMUSCULAR; INTRAVENOUS at 16:25

## 2019-03-20 RX ADMIN — CYCLOBENZAPRINE HYDROCHLORIDE 10 MG: 10 TABLET, FILM COATED ORAL at 12:46

## 2019-03-20 RX ADMIN — METRONIDAZOLE 500 MG: 500 INJECTION, SOLUTION INTRAVENOUS at 12:42

## 2019-03-20 ASSESSMENT — PAIN SCALES - GENERAL
PAINLEVEL_OUTOF10: 10
PAINLEVEL_OUTOF10: 10
PAINLEVEL_OUTOF10: 9
PAINLEVEL_OUTOF10: 10
PAINLEVEL_OUTOF10: 7
PAINLEVEL_OUTOF10: 8
PAINLEVEL_OUTOF10: 6
PAINLEVEL_OUTOF10: 7
PAINLEVEL_OUTOF10: 10
PAINLEVEL_OUTOF10: 10
PAINLEVEL_OUTOF10: 8

## 2019-03-20 ASSESSMENT — ENCOUNTER SYMPTOMS
VOMITING: 0
CHEST TIGHTNESS: 0
ABDOMINAL PAIN: 0
WHEEZING: 0
CONSTIPATION: 0
DIARRHEA: 0
PHOTOPHOBIA: 0
EYE PAIN: 0
BACK PAIN: 1
BLOOD IN STOOL: 0
COUGH: 0
SHORTNESS OF BREATH: 0
SORE THROAT: 0
RHINORRHEA: 0
NAUSEA: 0

## 2019-03-20 ASSESSMENT — PAIN DESCRIPTION - FREQUENCY
FREQUENCY: CONTINUOUS
FREQUENCY: CONTINUOUS

## 2019-03-20 ASSESSMENT — PAIN - FUNCTIONAL ASSESSMENT: PAIN_FUNCTIONAL_ASSESSMENT: ACTIVITIES ARE NOT PREVENTED

## 2019-03-20 ASSESSMENT — PAIN DESCRIPTION - PAIN TYPE
TYPE: ACUTE PAIN

## 2019-03-20 ASSESSMENT — PAIN DESCRIPTION - PROGRESSION: CLINICAL_PROGRESSION: NOT CHANGED

## 2019-03-20 ASSESSMENT — PAIN DESCRIPTION - ONSET: ONSET: ON-GOING

## 2019-03-20 ASSESSMENT — PAIN DESCRIPTION - LOCATION: LOCATION: BACK

## 2019-03-20 ASSESSMENT — PAIN DESCRIPTION - ORIENTATION: ORIENTATION: MID

## 2019-03-20 ASSESSMENT — PAIN DESCRIPTION - DESCRIPTORS: DESCRIPTORS: ACHING;CONSTANT

## 2019-03-21 ENCOUNTER — ANESTHESIA EVENT (OUTPATIENT)
Dept: OPERATING ROOM | Age: 54
DRG: 041 | End: 2019-03-21
Payer: COMMERCIAL

## 2019-03-21 ENCOUNTER — ANESTHESIA (OUTPATIENT)
Dept: OPERATING ROOM | Age: 54
DRG: 041 | End: 2019-03-21
Payer: COMMERCIAL

## 2019-03-21 VITALS
SYSTOLIC BLOOD PRESSURE: 139 MMHG | OXYGEN SATURATION: 100 % | RESPIRATION RATE: 4 BRPM | DIASTOLIC BLOOD PRESSURE: 78 MMHG | TEMPERATURE: 99 F

## 2019-03-21 LAB — GLUCOSE BLD-MCNC: 159 MG/DL (ref 70–108)

## 2019-03-21 PROCEDURE — 6360000002 HC RX W HCPCS: Performed by: PHYSICIAN ASSISTANT

## 2019-03-21 PROCEDURE — 6370000000 HC RX 637 (ALT 250 FOR IP): Performed by: PHYSICIAN ASSISTANT

## 2019-03-21 PROCEDURE — 0KBF0ZZ EXCISION OF RIGHT TRUNK MUSCLE, OPEN APPROACH: ICD-10-PCS | Performed by: ORTHOPAEDIC SURGERY

## 2019-03-21 PROCEDURE — 2709999900 HC NON-CHARGEABLE SUPPLY: Performed by: ORTHOPAEDIC SURGERY

## 2019-03-21 PROCEDURE — 87075 CULTR BACTERIA EXCEPT BLOOD: CPT

## 2019-03-21 PROCEDURE — 0JPT0MZ REMOVAL OF STIMULATOR GENERATOR FROM TRUNK SUBCUTANEOUS TISSUE AND FASCIA, OPEN APPROACH: ICD-10-PCS | Performed by: ORTHOPAEDIC SURGERY

## 2019-03-21 PROCEDURE — 2580000003 HC RX 258: Performed by: PHYSICIAN ASSISTANT

## 2019-03-21 PROCEDURE — 3600000005 HC SURGERY LEVEL 5 BASE: Performed by: ORTHOPAEDIC SURGERY

## 2019-03-21 PROCEDURE — 0KBG0ZZ EXCISION OF LEFT TRUNK MUSCLE, OPEN APPROACH: ICD-10-PCS | Performed by: ORTHOPAEDIC SURGERY

## 2019-03-21 PROCEDURE — 6360000002 HC RX W HCPCS: Performed by: INTERNAL MEDICINE

## 2019-03-21 PROCEDURE — 2500000003 HC RX 250 WO HCPCS: Performed by: REGISTERED NURSE

## 2019-03-21 PROCEDURE — 3700000000 HC ANESTHESIA ATTENDED CARE: Performed by: ORTHOPAEDIC SURGERY

## 2019-03-21 PROCEDURE — 3600000015 HC SURGERY LEVEL 5 ADDTL 15MIN: Performed by: ORTHOPAEDIC SURGERY

## 2019-03-21 PROCEDURE — 6360000002 HC RX W HCPCS: Performed by: REGISTERED NURSE

## 2019-03-21 PROCEDURE — 7100000000 HC PACU RECOVERY - FIRST 15 MIN: Performed by: ORTHOPAEDIC SURGERY

## 2019-03-21 PROCEDURE — 1200000000 HC SEMI PRIVATE

## 2019-03-21 PROCEDURE — 87205 SMEAR GRAM STAIN: CPT

## 2019-03-21 PROCEDURE — 87186 SC STD MICRODIL/AGAR DIL: CPT

## 2019-03-21 PROCEDURE — 82948 REAGENT STRIP/BLOOD GLUCOSE: CPT

## 2019-03-21 PROCEDURE — 87077 CULTURE AEROBIC IDENTIFY: CPT

## 2019-03-21 PROCEDURE — 2720000010 HC SURG SUPPLY STERILE: Performed by: ORTHOPAEDIC SURGERY

## 2019-03-21 PROCEDURE — 87147 CULTURE TYPE IMMUNOLOGIC: CPT

## 2019-03-21 PROCEDURE — 87070 CULTURE OTHR SPECIMN AEROBIC: CPT

## 2019-03-21 PROCEDURE — 2580000003 HC RX 258: Performed by: REGISTERED NURSE

## 2019-03-21 PROCEDURE — 3700000001 HC ADD 15 MINUTES (ANESTHESIA): Performed by: ORTHOPAEDIC SURGERY

## 2019-03-21 PROCEDURE — 6360000002 HC RX W HCPCS: Performed by: ANESTHESIOLOGY

## 2019-03-21 PROCEDURE — 7100000001 HC PACU RECOVERY - ADDTL 15 MIN: Performed by: ORTHOPAEDIC SURGERY

## 2019-03-21 RX ORDER — SUCCINYLCHOLINE/SOD CL,ISO/PF 200MG/10ML
SYRINGE (ML) INTRAVENOUS PRN
Status: DISCONTINUED | OUTPATIENT
Start: 2019-03-21 | End: 2019-03-21 | Stop reason: SDUPTHER

## 2019-03-21 RX ORDER — FENTANYL CITRATE 50 UG/ML
INJECTION, SOLUTION INTRAMUSCULAR; INTRAVENOUS PRN
Status: DISCONTINUED | OUTPATIENT
Start: 2019-03-21 | End: 2019-03-21 | Stop reason: SDUPTHER

## 2019-03-21 RX ORDER — ONDANSETRON 2 MG/ML
INJECTION INTRAMUSCULAR; INTRAVENOUS PRN
Status: DISCONTINUED | OUTPATIENT
Start: 2019-03-21 | End: 2019-03-21 | Stop reason: SDUPTHER

## 2019-03-21 RX ORDER — SENNA AND DOCUSATE SODIUM 50; 8.6 MG/1; MG/1
1 TABLET, FILM COATED ORAL 2 TIMES DAILY
Status: DISCONTINUED | OUTPATIENT
Start: 2019-03-21 | End: 2019-03-22 | Stop reason: HOSPADM

## 2019-03-21 RX ORDER — SODIUM CHLORIDE, SODIUM LACTATE, POTASSIUM CHLORIDE, CALCIUM CHLORIDE 600; 310; 30; 20 MG/100ML; MG/100ML; MG/100ML; MG/100ML
INJECTION, SOLUTION INTRAVENOUS CONTINUOUS PRN
Status: DISCONTINUED | OUTPATIENT
Start: 2019-03-21 | End: 2019-03-21 | Stop reason: SDUPTHER

## 2019-03-21 RX ORDER — MORPHINE SULFATE 2 MG/ML
2 INJECTION, SOLUTION INTRAMUSCULAR; INTRAVENOUS
Status: DISCONTINUED | OUTPATIENT
Start: 2019-03-21 | End: 2019-03-22 | Stop reason: HOSPADM

## 2019-03-21 RX ORDER — DEXAMETHASONE SODIUM PHOSPHATE 4 MG/ML
INJECTION, SOLUTION INTRA-ARTICULAR; INTRALESIONAL; INTRAMUSCULAR; INTRAVENOUS; SOFT TISSUE PRN
Status: DISCONTINUED | OUTPATIENT
Start: 2019-03-21 | End: 2019-03-21 | Stop reason: SDUPTHER

## 2019-03-21 RX ORDER — LABETALOL HYDROCHLORIDE 5 MG/ML
10 INJECTION, SOLUTION INTRAVENOUS EVERY 10 MIN PRN
Status: DISCONTINUED | OUTPATIENT
Start: 2019-03-21 | End: 2019-03-21 | Stop reason: HOSPADM

## 2019-03-21 RX ORDER — MORPHINE SULFATE 2 MG/ML
2 INJECTION, SOLUTION INTRAMUSCULAR; INTRAVENOUS EVERY 5 MIN PRN
Status: DISCONTINUED | OUTPATIENT
Start: 2019-03-21 | End: 2019-03-21 | Stop reason: HOSPADM

## 2019-03-21 RX ORDER — ONDANSETRON 2 MG/ML
4 INJECTION INTRAMUSCULAR; INTRAVENOUS EVERY 6 HOURS PRN
Status: DISCONTINUED | OUTPATIENT
Start: 2019-03-21 | End: 2019-03-22 | Stop reason: HOSPADM

## 2019-03-21 RX ORDER — PROPOFOL 10 MG/ML
INJECTION, EMULSION INTRAVENOUS PRN
Status: DISCONTINUED | OUTPATIENT
Start: 2019-03-21 | End: 2019-03-21 | Stop reason: SDUPTHER

## 2019-03-21 RX ORDER — MORPHINE SULFATE 4 MG/ML
4 INJECTION, SOLUTION INTRAMUSCULAR; INTRAVENOUS
Status: DISCONTINUED | OUTPATIENT
Start: 2019-03-21 | End: 2019-03-22 | Stop reason: HOSPADM

## 2019-03-21 RX ORDER — SODIUM CHLORIDE 0.9 % (FLUSH) 0.9 %
10 SYRINGE (ML) INJECTION PRN
Status: DISCONTINUED | OUTPATIENT
Start: 2019-03-21 | End: 2019-03-22 | Stop reason: HOSPADM

## 2019-03-21 RX ORDER — OXYCODONE HYDROCHLORIDE AND ACETAMINOPHEN 5; 325 MG/1; MG/1
2 TABLET ORAL EVERY 4 HOURS PRN
Status: DISCONTINUED | OUTPATIENT
Start: 2019-03-21 | End: 2019-03-22 | Stop reason: HOSPADM

## 2019-03-21 RX ORDER — METOCLOPRAMIDE HYDROCHLORIDE 5 MG/ML
INJECTION INTRAMUSCULAR; INTRAVENOUS PRN
Status: DISCONTINUED | OUTPATIENT
Start: 2019-03-21 | End: 2019-03-21 | Stop reason: SDUPTHER

## 2019-03-21 RX ORDER — SODIUM CHLORIDE 9 MG/ML
INJECTION, SOLUTION INTRAVENOUS CONTINUOUS
Status: DISCONTINUED | OUTPATIENT
Start: 2019-03-21 | End: 2019-03-22 | Stop reason: HOSPADM

## 2019-03-21 RX ORDER — GLYCOPYRROLATE 1 MG/5 ML
SYRINGE (ML) INTRAVENOUS PRN
Status: DISCONTINUED | OUTPATIENT
Start: 2019-03-21 | End: 2019-03-21 | Stop reason: SDUPTHER

## 2019-03-21 RX ORDER — BISACODYL 10 MG
10 SUPPOSITORY, RECTAL RECTAL DAILY PRN
Status: DISCONTINUED | OUTPATIENT
Start: 2019-03-21 | End: 2019-03-22 | Stop reason: HOSPADM

## 2019-03-21 RX ORDER — OXYCODONE HYDROCHLORIDE AND ACETAMINOPHEN 5; 325 MG/1; MG/1
1 TABLET ORAL EVERY 4 HOURS PRN
Status: DISCONTINUED | OUTPATIENT
Start: 2019-03-21 | End: 2019-03-22 | Stop reason: HOSPADM

## 2019-03-21 RX ORDER — DOCUSATE SODIUM 100 MG/1
100 CAPSULE, LIQUID FILLED ORAL 2 TIMES DAILY
Status: DISCONTINUED | OUTPATIENT
Start: 2019-03-21 | End: 2019-03-22 | Stop reason: HOSPADM

## 2019-03-21 RX ORDER — SPIRONOLACTONE AND HYDROCHLOROTHIAZIDE 25; 25 MG/1; MG/1
1 TABLET ORAL DAILY
Status: DISCONTINUED | OUTPATIENT
Start: 2019-03-21 | End: 2019-03-21 | Stop reason: CLARIF

## 2019-03-21 RX ORDER — SPIRONOLACTONE 25 MG/1
25 TABLET ORAL DAILY
Status: DISCONTINUED | OUTPATIENT
Start: 2019-03-21 | End: 2019-03-22 | Stop reason: HOSPADM

## 2019-03-21 RX ORDER — HYDROCHLOROTHIAZIDE 25 MG/1
25 TABLET ORAL DAILY
Status: DISCONTINUED | OUTPATIENT
Start: 2019-03-21 | End: 2019-03-22 | Stop reason: HOSPADM

## 2019-03-21 RX ORDER — SODIUM CHLORIDE 0.9 % (FLUSH) 0.9 %
10 SYRINGE (ML) INJECTION EVERY 12 HOURS SCHEDULED
Status: DISCONTINUED | OUTPATIENT
Start: 2019-03-21 | End: 2019-03-22 | Stop reason: HOSPADM

## 2019-03-21 RX ORDER — METFORMIN HYDROCHLORIDE 500 MG/1
500 TABLET, EXTENDED RELEASE ORAL 4 TIMES DAILY
Status: DISCONTINUED | OUTPATIENT
Start: 2019-03-21 | End: 2019-03-22 | Stop reason: HOSPADM

## 2019-03-21 RX ORDER — METOPROLOL SUCCINATE 25 MG/1
25 TABLET, EXTENDED RELEASE ORAL DAILY
Status: DISCONTINUED | OUTPATIENT
Start: 2019-03-21 | End: 2019-03-22 | Stop reason: HOSPADM

## 2019-03-21 RX ORDER — PANTOPRAZOLE SODIUM 40 MG/1
40 TABLET, DELAYED RELEASE ORAL DAILY
Status: DISCONTINUED | OUTPATIENT
Start: 2019-03-21 | End: 2019-03-22 | Stop reason: HOSPADM

## 2019-03-21 RX ORDER — FENTANYL CITRATE 50 UG/ML
50 INJECTION, SOLUTION INTRAMUSCULAR; INTRAVENOUS EVERY 5 MIN PRN
Status: DISCONTINUED | OUTPATIENT
Start: 2019-03-21 | End: 2019-03-21 | Stop reason: HOSPADM

## 2019-03-21 RX ORDER — KETOROLAC TROMETHAMINE 30 MG/ML
INJECTION, SOLUTION INTRAMUSCULAR; INTRAVENOUS PRN
Status: DISCONTINUED | OUTPATIENT
Start: 2019-03-21 | End: 2019-03-21 | Stop reason: SDUPTHER

## 2019-03-21 RX ORDER — CYCLOBENZAPRINE HCL 10 MG
10 TABLET ORAL 3 TIMES DAILY PRN
Status: DISCONTINUED | OUTPATIENT
Start: 2019-03-21 | End: 2019-03-22 | Stop reason: HOSPADM

## 2019-03-21 RX ORDER — BUPROPION HYDROCHLORIDE 75 MG/1
75 TABLET ORAL DAILY
Status: DISCONTINUED | OUTPATIENT
Start: 2019-03-21 | End: 2019-03-22 | Stop reason: HOSPADM

## 2019-03-21 RX ORDER — KETAMINE HCL IN NACL, ISO-OSM 100MG/10ML
SYRINGE (ML) INJECTION PRN
Status: DISCONTINUED | OUTPATIENT
Start: 2019-03-21 | End: 2019-03-21 | Stop reason: SDUPTHER

## 2019-03-21 RX ADMIN — LIDOCAINE HYDROCHLORIDE 100 MG: 20 INJECTION, SOLUTION INTRAVENOUS at 09:36

## 2019-03-21 RX ADMIN — ONDANSETRON HYDROCHLORIDE 4 MG: 4 INJECTION, SOLUTION INTRAMUSCULAR; INTRAVENOUS at 09:50

## 2019-03-21 RX ADMIN — DEXAMETHASONE SODIUM PHOSPHATE 8 MG: 4 INJECTION, SOLUTION INTRAMUSCULAR; INTRAVENOUS at 09:49

## 2019-03-21 RX ADMIN — Medication 50 MG: at 09:50

## 2019-03-21 RX ADMIN — Medication 10 ML: at 20:24

## 2019-03-21 RX ADMIN — PROPOFOL 200 MG: 10 INJECTION, EMULSION INTRAVENOUS at 09:36

## 2019-03-21 RX ADMIN — MORPHINE SULFATE 4 MG: 4 INJECTION, SOLUTION INTRAMUSCULAR; INTRAVENOUS at 05:15

## 2019-03-21 RX ADMIN — METOCLOPRAMIDE 10 MG: 5 INJECTION, SOLUTION INTRAMUSCULAR; INTRAVENOUS at 09:50

## 2019-03-21 RX ADMIN — LINEZOLID 600 MG: 600 INJECTION, SOLUTION INTRAVENOUS at 05:13

## 2019-03-21 RX ADMIN — MORPHINE SULFATE 4 MG: 4 INJECTION, SOLUTION INTRAMUSCULAR; INTRAVENOUS at 00:31

## 2019-03-21 RX ADMIN — DOCUSATE SODIUM AND SENNOSIDES 1 TABLET: 8.6; 5 TABLET, FILM COATED ORAL at 20:24

## 2019-03-21 RX ADMIN — MORPHINE SULFATE 4 MG: 4 INJECTION, SOLUTION INTRAMUSCULAR; INTRAVENOUS at 07:52

## 2019-03-21 RX ADMIN — PANTOPRAZOLE SODIUM 40 MG: 40 TABLET, DELAYED RELEASE ORAL at 08:29

## 2019-03-21 RX ADMIN — METFORMIN HYDROCHLORIDE 500 MG: 500 TABLET, EXTENDED RELEASE ORAL at 20:24

## 2019-03-21 RX ADMIN — SODIUM CHLORIDE, POTASSIUM CHLORIDE, SODIUM LACTATE AND CALCIUM CHLORIDE: 600; 310; 30; 20 INJECTION, SOLUTION INTRAVENOUS at 09:59

## 2019-03-21 RX ADMIN — FENTANYL CITRATE 100 MCG: 50 INJECTION INTRAMUSCULAR; INTRAVENOUS at 09:36

## 2019-03-21 RX ADMIN — METFORMIN HYDROCHLORIDE 500 MG: 500 TABLET, EXTENDED RELEASE ORAL at 17:59

## 2019-03-21 RX ADMIN — CEFEPIME 2 G: 1 INJECTION, POWDER, FOR SOLUTION INTRAMUSCULAR; INTRAVENOUS at 02:08

## 2019-03-21 RX ADMIN — DOCUSATE SODIUM 100 MG: 100 CAPSULE, LIQUID FILLED ORAL at 20:24

## 2019-03-21 RX ADMIN — DOCUSATE SODIUM 100 MG: 100 CAPSULE, LIQUID FILLED ORAL at 13:49

## 2019-03-21 RX ADMIN — DOCUSATE SODIUM AND SENNOSIDES 1 TABLET: 8.6; 5 TABLET, FILM COATED ORAL at 13:49

## 2019-03-21 RX ADMIN — MORPHINE SULFATE 2 MG: 2 INJECTION, SOLUTION INTRAMUSCULAR; INTRAVENOUS at 11:23

## 2019-03-21 RX ADMIN — Medication 200 MG: at 09:36

## 2019-03-21 RX ADMIN — OXYCODONE AND ACETAMINOPHEN 1 TABLET: 5; 325 TABLET ORAL at 13:20

## 2019-03-21 RX ADMIN — OXYCODONE AND ACETAMINOPHEN 1 TABLET: 5; 325 TABLET ORAL at 17:53

## 2019-03-21 RX ADMIN — LINEZOLID 600 MG: 600 INJECTION, SOLUTION INTRAVENOUS at 17:53

## 2019-03-21 RX ADMIN — Medication 0.2 MG: at 09:33

## 2019-03-21 RX ADMIN — OXYCODONE AND ACETAMINOPHEN 2 TABLET: 5; 325 TABLET ORAL at 22:35

## 2019-03-21 RX ADMIN — KETOROLAC TROMETHAMINE 30 MG: 30 INJECTION, SOLUTION INTRAMUSCULAR; INTRAVENOUS at 09:49

## 2019-03-21 RX ADMIN — FAMOTIDINE 20 MG: 10 INJECTION, SOLUTION INTRAVENOUS at 09:50

## 2019-03-21 RX ADMIN — MORPHINE SULFATE 2 MG: 2 INJECTION, SOLUTION INTRAMUSCULAR; INTRAVENOUS at 03:05

## 2019-03-21 ASSESSMENT — PULMONARY FUNCTION TESTS
PIF_VALUE: 21
PIF_VALUE: 19
PIF_VALUE: 5
PIF_VALUE: 1
PIF_VALUE: 3
PIF_VALUE: 25
PIF_VALUE: 19
PIF_VALUE: 26
PIF_VALUE: 1
PIF_VALUE: 25
PIF_VALUE: 6
PIF_VALUE: 26
PIF_VALUE: 26
PIF_VALUE: 27
PIF_VALUE: 3
PIF_VALUE: 26
PIF_VALUE: 24
PIF_VALUE: 19
PIF_VALUE: 2
PIF_VALUE: 42
PIF_VALUE: 22
PIF_VALUE: 22
PIF_VALUE: 3
PIF_VALUE: 19
PIF_VALUE: 3
PIF_VALUE: 25
PIF_VALUE: 3
PIF_VALUE: 19
PIF_VALUE: 3
PIF_VALUE: 3
PIF_VALUE: 1
PIF_VALUE: 3
PIF_VALUE: 26
PIF_VALUE: 25
PIF_VALUE: 19
PIF_VALUE: 19
PIF_VALUE: 22
PIF_VALUE: 3
PIF_VALUE: 19
PIF_VALUE: 3
PIF_VALUE: 26
PIF_VALUE: 25
PIF_VALUE: 24
PIF_VALUE: 5
PIF_VALUE: 19
PIF_VALUE: 24
PIF_VALUE: 25
PIF_VALUE: 18
PIF_VALUE: 0
PIF_VALUE: 21
PIF_VALUE: 3
PIF_VALUE: 3
PIF_VALUE: 23
PIF_VALUE: 19
PIF_VALUE: 22
PIF_VALUE: 24
PIF_VALUE: 2
PIF_VALUE: 26
PIF_VALUE: 19
PIF_VALUE: 19
PIF_VALUE: 25
PIF_VALUE: 1
PIF_VALUE: 26
PIF_VALUE: 19
PIF_VALUE: 25
PIF_VALUE: 5
PIF_VALUE: 2
PIF_VALUE: 23
PIF_VALUE: 26
PIF_VALUE: 25
PIF_VALUE: 3
PIF_VALUE: 19
PIF_VALUE: 22
PIF_VALUE: 23
PIF_VALUE: 33
PIF_VALUE: 25
PIF_VALUE: 19
PIF_VALUE: 22
PIF_VALUE: 24
PIF_VALUE: 25

## 2019-03-21 ASSESSMENT — PAIN DESCRIPTION - PAIN TYPE
TYPE: SURGICAL PAIN
TYPE: SURGICAL PAIN
TYPE: ACUTE PAIN
TYPE: SURGICAL PAIN
TYPE: ACUTE PAIN
TYPE: ACUTE PAIN
TYPE: SURGICAL PAIN
TYPE: ACUTE PAIN
TYPE: SURGICAL PAIN

## 2019-03-21 ASSESSMENT — PAIN DESCRIPTION - ORIENTATION
ORIENTATION: MID

## 2019-03-21 ASSESSMENT — PAIN SCALES - GENERAL
PAINLEVEL_OUTOF10: 5
PAINLEVEL_OUTOF10: 7
PAINLEVEL_OUTOF10: 8
PAINLEVEL_OUTOF10: 7
PAINLEVEL_OUTOF10: 9
PAINLEVEL_OUTOF10: 5
PAINLEVEL_OUTOF10: 6
PAINLEVEL_OUTOF10: 10
PAINLEVEL_OUTOF10: 6
PAINLEVEL_OUTOF10: 7
PAINLEVEL_OUTOF10: 4
PAINLEVEL_OUTOF10: 7
PAINLEVEL_OUTOF10: 6
PAINLEVEL_OUTOF10: 5
PAINLEVEL_OUTOF10: 8
PAINLEVEL_OUTOF10: 8

## 2019-03-21 ASSESSMENT — PAIN DESCRIPTION - DESCRIPTORS
DESCRIPTORS: ACHING;CONSTANT
DESCRIPTORS: ACHING;DISCOMFORT;POUNDING;SORE
DESCRIPTORS: ACHING
DESCRIPTORS: ACHING

## 2019-03-21 ASSESSMENT — PAIN DESCRIPTION - FREQUENCY
FREQUENCY: INTERMITTENT

## 2019-03-21 ASSESSMENT — PAIN DESCRIPTION - PROGRESSION
CLINICAL_PROGRESSION: NOT CHANGED
CLINICAL_PROGRESSION: GRADUALLY IMPROVING
CLINICAL_PROGRESSION: GRADUALLY WORSENING

## 2019-03-21 ASSESSMENT — PAIN DESCRIPTION - LOCATION
LOCATION: BACK

## 2019-03-21 ASSESSMENT — PAIN DESCRIPTION - ONSET
ONSET: ON-GOING

## 2019-03-21 ASSESSMENT — PAIN - FUNCTIONAL ASSESSMENT
PAIN_FUNCTIONAL_ASSESSMENT: PREVENTS OR INTERFERES SOME ACTIVE ACTIVITIES AND ADLS
PAIN_FUNCTIONAL_ASSESSMENT: ACTIVITIES ARE NOT PREVENTED
PAIN_FUNCTIONAL_ASSESSMENT: ACTIVITIES ARE NOT PREVENTED

## 2019-03-22 VITALS
SYSTOLIC BLOOD PRESSURE: 112 MMHG | OXYGEN SATURATION: 97 % | RESPIRATION RATE: 18 BRPM | HEIGHT: 65 IN | WEIGHT: 288.6 LBS | HEART RATE: 79 BPM | BODY MASS INDEX: 48.08 KG/M2 | DIASTOLIC BLOOD PRESSURE: 57 MMHG | TEMPERATURE: 98.7 F

## 2019-03-22 LAB
ANION GAP SERPL CALCULATED.3IONS-SCNC: 12 MEQ/L (ref 8–16)
BASOPHILS # BLD: 0.2 %
BASOPHILS ABSOLUTE: 0 THOU/MM3 (ref 0–0.1)
BUN BLDV-MCNC: 13 MG/DL (ref 7–22)
CALCIUM SERPL-MCNC: 8.6 MG/DL (ref 8.5–10.5)
CHLORIDE BLD-SCNC: 100 MEQ/L (ref 98–111)
CO2: 24 MEQ/L (ref 23–33)
CREAT SERPL-MCNC: 0.8 MG/DL (ref 0.4–1.2)
EOSINOPHIL # BLD: 0.1 %
EOSINOPHILS ABSOLUTE: 0 THOU/MM3 (ref 0–0.4)
ERYTHROCYTE [DISTWIDTH] IN BLOOD BY AUTOMATED COUNT: 13.2 % (ref 11.5–14.5)
ERYTHROCYTE [DISTWIDTH] IN BLOOD BY AUTOMATED COUNT: 41.6 FL (ref 35–45)
GFR SERPL CREATININE-BSD FRML MDRD: 75 ML/MIN/1.73M2
GLUCOSE BLD-MCNC: 259 MG/DL (ref 70–108)
HCT VFR BLD CALC: 28.1 % (ref 37–47)
HEMOGLOBIN: 9.2 GM/DL (ref 12–16)
IMMATURE GRANS (ABS): 0.06 THOU/MM3 (ref 0–0.07)
IMMATURE GRANULOCYTES: 0.5 %
LYMPHOCYTES # BLD: 8.8 %
LYMPHOCYTES ABSOLUTE: 1.1 THOU/MM3 (ref 1–4.8)
MCH RBC QN AUTO: 28.2 PG (ref 26–33)
MCHC RBC AUTO-ENTMCNC: 32.7 GM/DL (ref 32.2–35.5)
MCV RBC AUTO: 86.2 FL (ref 81–99)
MONOCYTES # BLD: 9.6 %
MONOCYTES ABSOLUTE: 1.2 THOU/MM3 (ref 0.4–1.3)
NUCLEATED RED BLOOD CELLS: 0 /100 WBC
PLATELET # BLD: 297 THOU/MM3 (ref 130–400)
PMV BLD AUTO: 10.4 FL (ref 9.4–12.4)
POTASSIUM SERPL-SCNC: 4.4 MEQ/L (ref 3.5–5.2)
RBC # BLD: 3.26 MILL/MM3 (ref 4.2–5.4)
SEG NEUTROPHILS: 80.8 %
SEGMENTED NEUTROPHILS ABSOLUTE COUNT: 10.4 THOU/MM3 (ref 1.8–7.7)
SODIUM BLD-SCNC: 136 MEQ/L (ref 135–145)
WBC # BLD: 12.9 THOU/MM3 (ref 4.8–10.8)

## 2019-03-22 PROCEDURE — 6370000000 HC RX 637 (ALT 250 FOR IP): Performed by: PHYSICIAN ASSISTANT

## 2019-03-22 PROCEDURE — 2580000003 HC RX 258: Performed by: PHYSICIAN ASSISTANT

## 2019-03-22 PROCEDURE — 85025 COMPLETE CBC W/AUTO DIFF WBC: CPT

## 2019-03-22 PROCEDURE — 6360000002 HC RX W HCPCS: Performed by: PHYSICIAN ASSISTANT

## 2019-03-22 PROCEDURE — 80048 BASIC METABOLIC PNL TOTAL CA: CPT

## 2019-03-22 PROCEDURE — 36415 COLL VENOUS BLD VENIPUNCTURE: CPT

## 2019-03-22 RX ORDER — CEPHALEXIN 500 MG/1
500 CAPSULE ORAL 4 TIMES DAILY
Qty: 56 CAPSULE | Refills: 0 | Status: SHIPPED | OUTPATIENT
Start: 2019-03-22 | End: 2019-04-05

## 2019-03-22 RX ADMIN — METOPROLOL SUCCINATE 25 MG: 25 TABLET, FILM COATED, EXTENDED RELEASE ORAL at 09:04

## 2019-03-22 RX ADMIN — Medication 10 ML: at 09:05

## 2019-03-22 RX ADMIN — METFORMIN HYDROCHLORIDE 500 MG: 500 TABLET, EXTENDED RELEASE ORAL at 09:04

## 2019-03-22 RX ADMIN — OXYCODONE AND ACETAMINOPHEN 1 TABLET: 5; 325 TABLET ORAL at 09:04

## 2019-03-22 RX ADMIN — DOCUSATE SODIUM AND SENNOSIDES 1 TABLET: 8.6; 5 TABLET, FILM COATED ORAL at 09:04

## 2019-03-22 RX ADMIN — HYDROCHLOROTHIAZIDE 25 MG: 25 TABLET ORAL at 09:04

## 2019-03-22 RX ADMIN — PANTOPRAZOLE SODIUM 40 MG: 40 TABLET, DELAYED RELEASE ORAL at 07:36

## 2019-03-22 RX ADMIN — BUPROPION HYDROCHLORIDE 75 MG: 75 TABLET, FILM COATED ORAL at 09:04

## 2019-03-22 RX ADMIN — LINEZOLID 600 MG: 600 INJECTION, SOLUTION INTRAVENOUS at 05:00

## 2019-03-22 RX ADMIN — METFORMIN HYDROCHLORIDE 500 MG: 500 TABLET, EXTENDED RELEASE ORAL at 13:11

## 2019-03-22 RX ADMIN — OXYCODONE AND ACETAMINOPHEN 2 TABLET: 5; 325 TABLET ORAL at 13:10

## 2019-03-22 RX ADMIN — SPIRONOLACTONE 25 MG: 25 TABLET, FILM COATED ORAL at 09:04

## 2019-03-22 RX ADMIN — DOCUSATE SODIUM 100 MG: 100 CAPSULE, LIQUID FILLED ORAL at 09:04

## 2019-03-22 ASSESSMENT — PAIN - FUNCTIONAL ASSESSMENT: PAIN_FUNCTIONAL_ASSESSMENT: ACTIVITIES ARE NOT PREVENTED

## 2019-03-22 ASSESSMENT — PAIN SCALES - GENERAL
PAINLEVEL_OUTOF10: 6
PAINLEVEL_OUTOF10: 5
PAINLEVEL_OUTOF10: 8
PAINLEVEL_OUTOF10: 7
PAINLEVEL_OUTOF10: 6

## 2019-03-22 ASSESSMENT — PAIN DESCRIPTION - PAIN TYPE
TYPE: SURGICAL PAIN

## 2019-03-22 ASSESSMENT — PAIN DESCRIPTION - DESCRIPTORS: DESCRIPTORS: ACHING

## 2019-03-22 ASSESSMENT — PAIN DESCRIPTION - LOCATION
LOCATION: BACK

## 2019-03-22 ASSESSMENT — PAIN DESCRIPTION - ORIENTATION: ORIENTATION: MID

## 2019-03-22 ASSESSMENT — PAIN DESCRIPTION - ONSET: ONSET: ON-GOING

## 2019-03-22 ASSESSMENT — PAIN DESCRIPTION - PROGRESSION: CLINICAL_PROGRESSION: GRADUALLY IMPROVING

## 2019-03-22 ASSESSMENT — PAIN DESCRIPTION - FREQUENCY: FREQUENCY: CONTINUOUS

## 2019-03-25 LAB
AEROBIC CULTURE: ABNORMAL
ANAEROBIC CULTURE: ABNORMAL
BLOOD CULTURE, ROUTINE: NORMAL
BLOOD CULTURE, ROUTINE: NORMAL
GRAM STAIN RESULT: ABNORMAL
ORGANISM: ABNORMAL

## 2019-03-26 ENCOUNTER — APPOINTMENT (OUTPATIENT)
Dept: GENERAL RADIOLOGY | Age: 54
End: 2019-03-26
Payer: COMMERCIAL

## 2019-03-26 ENCOUNTER — HOSPITAL ENCOUNTER (EMERGENCY)
Age: 54
Discharge: HOME | End: 2019-03-26
Attending: FAMILY MEDICINE
Payer: COMMERCIAL

## 2019-03-26 VITALS
DIASTOLIC BLOOD PRESSURE: 42 MMHG | HEART RATE: 75 BPM | OXYGEN SATURATION: 99 % | WEIGHT: 230 LBS | TEMPERATURE: 98.5 F | SYSTOLIC BLOOD PRESSURE: 112 MMHG | HEIGHT: 65 IN | BODY MASS INDEX: 38.32 KG/M2 | RESPIRATION RATE: 20 BRPM

## 2019-03-26 DIAGNOSIS — R07.81 RIB PAIN ON RIGHT SIDE: Primary | ICD-10-CM

## 2019-03-26 LAB
AEROBIC CULTURE: ABNORMAL
ANAEROBIC CULTURE: ABNORMAL
GRAM STAIN RESULT: ABNORMAL
ORGANISM: ABNORMAL

## 2019-03-26 PROCEDURE — 96372 THER/PROPH/DIAG INJ SC/IM: CPT

## 2019-03-26 PROCEDURE — 6360000002 HC RX W HCPCS: Performed by: FAMILY MEDICINE

## 2019-03-26 PROCEDURE — 99284 EMERGENCY DEPT VISIT MOD MDM: CPT

## 2019-03-26 PROCEDURE — 71046 X-RAY EXAM CHEST 2 VIEWS: CPT

## 2019-03-26 RX ORDER — CYCLOBENZAPRINE HCL 10 MG
10 TABLET ORAL 2 TIMES DAILY PRN
Qty: 14 TABLET | Refills: 0 | Status: SHIPPED | OUTPATIENT
Start: 2019-03-26 | End: 2019-04-22

## 2019-03-26 RX ORDER — KETOROLAC TROMETHAMINE 10 MG/1
10 TABLET, FILM COATED ORAL EVERY 6 HOURS PRN
Qty: 16 TABLET | Refills: 0 | Status: ON HOLD | OUTPATIENT
Start: 2019-03-26 | End: 2019-09-05

## 2019-03-26 RX ORDER — KETOROLAC TROMETHAMINE 30 MG/ML
60 INJECTION, SOLUTION INTRAMUSCULAR; INTRAVENOUS ONCE
Status: COMPLETED | OUTPATIENT
Start: 2019-03-26 | End: 2019-03-26

## 2019-03-26 RX ADMIN — KETOROLAC TROMETHAMINE 60 MG: 30 INJECTION, SOLUTION INTRAMUSCULAR at 22:28

## 2019-03-26 ASSESSMENT — PAIN DESCRIPTION - ORIENTATION: ORIENTATION: RIGHT

## 2019-03-26 ASSESSMENT — PAIN SCALES - GENERAL
PAINLEVEL_OUTOF10: 10
PAINLEVEL_OUTOF10: 8

## 2019-03-26 ASSESSMENT — ENCOUNTER SYMPTOMS
SHORTNESS OF BREATH: 0
ABDOMINAL PAIN: 0

## 2019-03-26 ASSESSMENT — PAIN DESCRIPTION - DESCRIPTORS: DESCRIPTORS: STABBING

## 2019-03-26 ASSESSMENT — PAIN DESCRIPTION - LOCATION: LOCATION: BACK;RIB CAGE

## 2019-03-26 ASSESSMENT — PAIN DESCRIPTION - PAIN TYPE: TYPE: ACUTE PAIN

## 2019-04-10 ENCOUNTER — HOSPITAL ENCOUNTER (OUTPATIENT)
Dept: WOUND CARE | Age: 54
Discharge: HOME | End: 2019-04-10
Payer: COMMERCIAL

## 2019-04-10 VITALS
TEMPERATURE: 97.9 F | OXYGEN SATURATION: 100 % | SYSTOLIC BLOOD PRESSURE: 129 MMHG | RESPIRATION RATE: 20 BRPM | HEART RATE: 106 BPM | DIASTOLIC BLOOD PRESSURE: 78 MMHG

## 2019-04-10 PROCEDURE — 99211 OFF/OP EST MAY X REQ PHY/QHP: CPT

## 2019-04-10 ASSESSMENT — PAIN SCALES - GENERAL: PAINLEVEL_OUTOF10: 3

## 2019-04-10 NOTE — PLAN OF CARE
Pt. Seen today for infected stimulator to the back. Stimulator was removed and wound is healed. No signs and symptoms of infection. No antibiotics needed at this time. Discharge from clinic. Care plan reviewed with patient. Patient verbalize understanding of the plan of care and contribute to goal setting.

## 2019-04-10 NOTE — PROGRESS NOTES
[address] [facility]          Progress Note and Procedure Note      Neal Fairbanks  MEDICAL RECORD NUMBER:  [id number]  AGE: 47 y.o. GENDER: female  : 1965  EPISODE DATE:  4/10/2019    Subjective:     SUBJECTIVE     Chief Complaint   Patient presents with    Wound Check     back abscess           HISTORY OF PRESENT ILLNESS   She was seen for follow-up visit. She had a recent spinal cord stimulators that was infected. It was removed. She has been should have her oral antibiotics. Today she was seen for her follow-up. She has no new complaint she denies any fever or chills the bulk wound has completely healed. She has history of chronic back pain due to degenerative joint disease requiring multiple operations she is diabetic and obese.     PAST MEDICAL HISTORY         Diagnosis Date    Chronic back pain     Depression 3/24/2013    Diabetes mellitus ([address]     GERD (gastroesophageal reflux disease)     Hypertension     Obesity 3/24/2013    HENRY on CPAP     Osteoarthritis     PONV (postoperative nausea and vomiting)     Pyelonephritis 3/24/2013    Unspecified sleep apnea     with C-Pap    UTI (urinary tract infection)          PAST SURGICAL HISTORY     Past Surgical History:   Procedure Laterality Date    ANTERIOR CRUCIATE LIGAMENT REPAIR Left 2002    BACK SURGERY      lumbar    BACK SURGERY  2018    herniated disk     CYST REMOVAL      Left wrist, hand and ankle    DILATATION, ESOPHAGUS      ENDOMETRIAL ABLATION      Dr Gustavo Hyde, TOTAL ABDOMINAL  2016    Robotic    LOBECTOMY, THYROID  5/21/10    right lobectomy    NASAL SINUS SURGERY  2017    Dr. Chris Holm in [place] [address] N/A 2019    T7-8 SPINAL CORD STIMULATOR IMPLANT performed by Zhanna Salazar MD at [facility] N/A 3/21/2019    THORACIC I&D WITH SPINAL STIMULATOR AND BATTERY REMOVAL performed by Orion Guerrero Karli Cristobal MD at [address]  2/28/13    c5, c6, c7    SPINAL FUSION  10/03/2017     FAMILY HISTORY         Family History   Problem Relation Age of Onset    Arthritis Mother     Cancer Mother     High Blood Pressure Mother     Stroke Mother     Heart Attack Maternal Grandmother     Heart Attack Maternal Grandfather     Early Death Father 25        [de-identified]    Diabetes Neg Hx     Heart Disease Neg Hx      SOCIAL HISTORY       Social History     Tobacco Use    Smoking status: Never Smoker    Smokeless tobacco: Never Used   Substance Use Topics    Alcohol use:  Yes     Alcohol/week: 0.0 oz     Comment: very rare    Drug use: No     ALLERGIES         Allergies   Allergen Reactions    Iv Dye [Iodides] Anaphylaxis    Ampicillin Rash    Vancomycin Itching, Rash and Other (See Comments)     Tingling in lips     MEDICATIONS         Current Outpatient Medications on File Prior to Encounter   Medication Sig Dispense Refill    ketorolac (TORADOL) 10 MG tablet Take 1 tablet by mouth every 6 hours as needed for Pain 16 tablet 0    cyclobenzaprine (FLEXERIL) 10 MG tablet Take 1 tablet by mouth 2 times daily as needed for Muscle spasms 14 tablet 0    buPROPion (WELLBUTRIN) 75 MG tablet Take 75 mg by mouth daily      metFORMIN (GLUCOPHAGE) 500 MG tablet Take 500 mg by mouth 4 times daily       spironolactone-hydrochlorothiazide (ALDACTAZIDE) 25-25 MG per tablet take 1 tablet by mouth once daily 90 tablet 1    metoprolol succinate (TOPROL XL) 25 MG extended release tablet Take 1 tablet by mouth daily 90 tablet 1    CPAP Machine MISC by Does not apply route Please change CPAP pressure to 12 cm H20. 1 each 0    ibuprofen (ADVIL;MOTRIN) 600 MG tablet Take 1 tablet by mouth every 6 hours as needed for Pain 60 tablet 1    linaclotide (LINZESS) 145 MCG capsule Take 145 mcg by mouth every morning (before breakfast)      omeprazole (PRILOSEC) 20 MG capsule Take 20 mg by mouth daily        No current facility-administered medications on file prior to encounter. REVIEW OF SYSTEMS:     Constitutional: no fever, no night sweats, no fatigue. Head: no head ache , no head injury, no migranes. Eye: no blurring of vision, no double vision. Ears: no hearing difficulty, no tinnitus  Mouth/throat: no ulceration, dental caries , dysphagia  Lungs: no cough, no shortness of breath, no wheeze  CVS: no palpitation, no chest pain, no shortness of breath  GI: no abdominal pain, no nausea , no vomiting, no constipation  SILVESTRE: no dysuria, frequency and urgency, no hematuria, no kidney stones  Musculoskeletal: Chronic back pain. Endocrine: no polyuria, polydipsia, no cold or heat intolerance  Hematology: no anemia, no easy brusing or bleeding, no hx of clotting disorder  Dermatology: no skin rash, no eczema, no pruritis,  Psychiatry: no depression, no anxiety,no panic attacks, no suicide ideation        PHYSICAL EXAM      oral temperature is 97.9 °F (36.6 °C). Her blood pressure is 129/78 and her pulse is 106. Her respiration is 20 and oxygen saturation is 100%.        Wt Readings from Last 3 Encounters:   03/26/19 230 lb (104.3 kg)   03/20/19 288 lb 9.6 oz (130.9 kg)   02/22/19 242 lb 1 oz (109.8 kg)     General Appearance  Awake, alert, oriented,  not  In acute distress  HEENT - normocephalic, atraumatic, pink conjunctiva,  anicteric sclera  Neck - Supple, no mass  Lungs -  Bilateral good air entry, no rhonchi, no wheeze  Cardiovascular - Heart sounds are normal.    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 Patient will be discharged from the wound clinic. Advised to call if she has any concerns. Patient Active Problem List   Diagnosis Code    GERD (gastroesophageal reflux disease) K21.9    Dysphonia R49.0    Dysphagia R13.10    Left thyroid nodule E04.1    Pyelonephritis N12    Leukocytosis D72.829    Hypertension I10    Weakness R53.1    Depression F32.9    History of fusion of cervical spine Z98.1    Status post partial thyroidectomy Z98.890    Presbyesophagus K22.8    Obesity (BMI 30-39. 9) E66.9    Obstructive sleep apnea on CPAP G47.33, Z99.89    EKG abnormalities R94.31    Tachycardia R00.0    Chronic pain syndrome G89.4    Infection of spinal cord stimulator [facility]) T85.733A       Plan:     Patient examined and evaluated    Treatment: No orders of the defined types were placed in this encounter. Please see attached Discharge Instructions    Written patient dismissal instructions given to patient and signed by patient or POA. Discharge Instructions       Visit Discharge/Physician Orders: no signs and symptoms of infection at this time  No antibiotics needed    Wound Location: back       Follow up visit:   discharge    Keep next scheduled appointment. Please give 24 hour notice if unable to keep appointment. [phone]    If you experience any of the following, please call the Wound Care Service during business hours: Monday through Friday 8:00 am - 4:30 pm  ([phone]). *Increase in pain   *Temperature over 101   *Increase in drainage from your wound or a foul odor   *Uncontrolled swelling   *Need for compression bandage changes due to slippage, breakthrough drainage    If you need medical attention outside of business hours, please contact your Primary Care Doctor or go to the nearest emergency room.                    Electronically signed by Hung Galeano

## 2019-04-22 ENCOUNTER — HOSPITAL ENCOUNTER (EMERGENCY)
Age: 54
Discharge: HOME | End: 2019-04-22
Payer: COMMERCIAL

## 2019-04-22 ENCOUNTER — TELEPHONE (OUTPATIENT)
Dept: WOUND CARE | Age: 54
End: 2019-04-22

## 2019-04-22 VITALS
WEIGHT: 224 LBS | SYSTOLIC BLOOD PRESSURE: 138 MMHG | RESPIRATION RATE: 16 BRPM | OXYGEN SATURATION: 98 % | HEART RATE: 102 BPM | HEIGHT: 65 IN | TEMPERATURE: 97.4 F | BODY MASS INDEX: 37.32 KG/M2 | DIASTOLIC BLOOD PRESSURE: 66 MMHG

## 2019-04-22 DIAGNOSIS — T81.49XA SURGICAL SITE INFECTION: Primary | ICD-10-CM

## 2019-04-22 DIAGNOSIS — T81.31XA DEHISCENCE OF OPERATIVE WOUND, INITIAL ENCOUNTER: ICD-10-CM

## 2019-04-22 PROCEDURE — 87147 CULTURE TYPE IMMUNOLOGIC: CPT

## 2019-04-22 PROCEDURE — 87186 SC STD MICRODIL/AGAR DIL: CPT

## 2019-04-22 PROCEDURE — 87077 CULTURE AEROBIC IDENTIFY: CPT

## 2019-04-22 PROCEDURE — 99213 OFFICE O/P EST LOW 20 MIN: CPT

## 2019-04-22 PROCEDURE — 87070 CULTURE OTHR SPECIMN AEROBIC: CPT

## 2019-04-22 PROCEDURE — 87205 SMEAR GRAM STAIN: CPT

## 2019-04-22 PROCEDURE — 99213 OFFICE O/P EST LOW 20 MIN: CPT | Performed by: NURSE PRACTITIONER

## 2019-04-22 RX ORDER — CEPHALEXIN 500 MG/1
500 CAPSULE ORAL 4 TIMES DAILY
Qty: 56 CAPSULE | Refills: 0 | Status: SHIPPED | OUTPATIENT
Start: 2019-04-22 | End: 2019-05-06

## 2019-04-22 RX ORDER — TRAMADOL HYDROCHLORIDE 50 MG/1
50 TABLET ORAL EVERY 4 HOURS PRN
Qty: 12 TABLET | Refills: 0 | Status: SHIPPED | OUTPATIENT
Start: 2019-04-22 | End: 2019-04-24

## 2019-04-22 ASSESSMENT — PAIN DESCRIPTION - ORIENTATION: ORIENTATION: MID

## 2019-04-22 ASSESSMENT — ENCOUNTER SYMPTOMS
NAUSEA: 0
VOMITING: 0

## 2019-04-22 ASSESSMENT — PAIN DESCRIPTION - PAIN TYPE: TYPE: ACUTE PAIN

## 2019-04-22 ASSESSMENT — PAIN DESCRIPTION - ONSET: ONSET: GRADUAL

## 2019-04-22 ASSESSMENT — PAIN DESCRIPTION - PROGRESSION: CLINICAL_PROGRESSION: GRADUALLY WORSENING

## 2019-04-22 ASSESSMENT — PAIN - FUNCTIONAL ASSESSMENT: PAIN_FUNCTIONAL_ASSESSMENT: PREVENTS OR INTERFERES SOME ACTIVE ACTIVITIES AND ADLS

## 2019-04-22 ASSESSMENT — PAIN DESCRIPTION - LOCATION: LOCATION: BACK

## 2019-04-22 ASSESSMENT — PAIN SCALES - GENERAL: PAINLEVEL_OUTOF10: 10

## 2019-04-22 ASSESSMENT — PAIN DESCRIPTION - DESCRIPTORS: DESCRIPTORS: ACHING;SORE

## 2019-04-22 ASSESSMENT — PAIN DESCRIPTION - FREQUENCY: FREQUENCY: CONTINUOUS

## 2019-04-22 NOTE — TELEPHONE ENCOUNTER
Caro Pinto CNP called from [address] [place] [address]  stating pts back wound is re open and draining. Per Caro Pinto pt afebrile. Advised Dr. Adonis Acevedo. Dr. Adonis Acevedo recommended culture to be done at [facility] and a follow up appt on Wednesday April 24th @ 10:30 am. CNP voiced understanding.

## 2019-04-22 NOTE — ED PROVIDER NOTES
[address]  Urgent Care Encounter       CHIEF COMPLAINT       Chief Complaint   Patient presents with    Wound Infection     mid back       Nurses Notes reviewed and I agree except as noted in the HPI. HISTORY OF PRESENT ILLNESS   Marylin Fleischer is a 47 y.o. female who presents with complaints of back pain and possible wound infection. The patient had a spinal stimulator placed in February. She developed an abscess and had the device removed on March 20 and was started on Keflex for 2 weeks. She follow-up with infectious disease on April 10 and everything seemed to be healing well at that time. She developed the pain 3 days ago and noticed some drainage from the vision with a small open area to the distal end. Pain is rated a 10 out of 10 and she states it feels the same as when she had the infection previously. No numbness or tingling, fever, chills, nausea or vomiting. The history is provided by the patient. REVIEW OF SYSTEMS     Review of Systems   Constitutional: Negative for fatigue and fever. Gastrointestinal: Negative for nausea and vomiting. Skin: Positive for wound (see HPI). Neurological: Negative for dizziness and numbness. PAST MEDICAL HISTORY         Diagnosis Date    Chronic back pain     Depression 3/24/2013    Diabetes mellitus ([facility] [address]     GERD (gastroesophageal reflux disease)     Hypertension     Obesity 3/24/2013    HENRY on CPAP     Osteoarthritis     PONV (postoperative nausea and vomiting)     Pyelonephritis 3/24/2013    Unspecified sleep apnea     with C-Pap    UTI (urinary tract infection)        SURGICALHISTORY     Patient  has a past surgical history that includes Anterior cruciate ligament repair (Left, 2002); Endometrial ablation (2008); cyst removal (2000); Lobectomy, Thyroid (5/21/10); Spinal fusion (2/28/13); back surgery; Hysterectomy, total abdominal (11/09/2016); Spinal fusion (10/03/2017);  Nasal sinus surgery (11/2017); back surgery (05/01/2018); Dilatation, esophagus; spinal cord stimulator implantation (N/A, 2/22/2019); and spinal cord stimulator implantation (N/A, 3/21/2019). CURRENT MEDICATIONS       Discharge Medication List as of 4/22/2019  8:41 AM      CONTINUE these medications which have NOT CHANGED    Details   ketorolac (TORADOL) 10 MG tablet Take 1 tablet by mouth every 6 hours as needed for Pain, Disp-16 tablet, R-0Print      buPROPion (WELLBUTRIN) 75 MG tablet Take 75 mg by mouth dailyHistorical Med      spironolactone-hydrochlorothiazide (ALDACTAZIDE) 25-25 MG per tablet take 1 tablet by mouth once daily, Disp-90 tablet, R-1Normal      metoprolol succinate (TOPROL XL) 25 MG extended release tablet Take 1 tablet by mouth daily, Disp-90 tablet, R-1Normal      ibuprofen (ADVIL;MOTRIN) 600 MG tablet Take 1 tablet by mouth every 6 hours as needed for Pain, Disp-60 tablet, R-1      linaclotide (LINZESS) 145 MCG capsule Take 145 mcg by mouth every morning (before breakfast)      omeprazole (PRILOSEC) 20 MG capsule Take 20 mg by mouth daily       CPAP Machine MISC Starting Mon 10/9/2017, Disp-1 each, R-0, PrintPlease change CPAP pressure to 12 cm H20. ALLERGIES     Patient is is allergic to iv dye [iodides]; ampicillin; and vancomycin. Patients   There is no immunization history on file for this patient. FAMILY HISTORY     Patient's family history includes Arthritis in her mother; Cancer in her mother; Early Death (age of onset: 25) in her father; Heart Attack in her maternal grandfather and maternal grandmother; High Blood Pressure in her mother; Stroke in her mother. SOCIAL HISTORY     Patient  reports that she has never smoked. She has never used smokeless tobacco. She reports that she drinks alcohol. She reports that she does not use drugs.     PHYSICAL EXAM     ED TRIAGE VITALS  BP: 138/66, Temp: 97.4 °F (36.3 °C), Pulse: 102, Resp: 16, SpO2: 98 %,Estimated body mass index is 37.28 kg/m² as calculated from the following:    Height as of this encounter: 5' 5\" (1.651 m). Weight as of this encounter: 224 lb (101.6 kg). ,No LMP recorded. Patient has had a hysterectomy. Physical Exam   Constitutional: She is oriented to person, place, and time. She appears well-developed and well-nourished. She is cooperative. No distress. HENT:   Head: Normocephalic and atraumatic. Pulmonary/Chest: Effort normal. No respiratory distress. Musculoskeletal:        Thoracic back: She exhibits tenderness (Running the surgical scar). She exhibits no swelling. Back:    Thoracic surgical incision/scar with an approximately 1 to meter area of dehiscence distally. Able to apply pressure and express a small amount purulent discharge. No surrounding erythema. The area is tender to palpation. Neurological: She is alert and oriented to person, place, and time. Skin: Skin is warm and dry. Psychiatric: She has a normal mood and affect. Her speech is normal and behavior is normal.   Nursing note and vitals reviewed. DIAGNOSTIC RESULTS     Labs:No results found for this visit on 04/22/19. IMAGING:    No orders to display         URGENT CARE COURSE:     Vitals:    04/22/19 0808 04/22/19 0843   BP: (!) 147/68 138/66   Pulse: 108 102   Resp: 18 16   Temp: 97.4 °F (36.3 °C)    TempSrc: Temporal    SpO2: 98% 98%   Weight: 224 lb (101.6 kg)    Height: 5' 5\" (1.651 m)        Medications - No data to display         PROCEDURES:  None    FINAL IMPRESSION      1. Surgical site infection    2. Dehiscence of operative wound, initial encounter          DISPOSITION/ PLAN     Patient presents with the thoracic back pain with an open wound to previous surgical site. The majority of the incision is healed however there is a 1 cm open area distally. This area has been draining according to the patient. Small amount of purulent material was expressed and cultured.   Consulted with the wound clinic who spoke with  Camilo. Patient will be scheduled for an appointment on Wednesday, April 24. Patient will be started on Keflex as the previous culture grew MSSA. The patient was given 2 days of tramadol. 08 RRS report was reviewed. All questions were answered and the patient did verbalize understanding. She was in agreement with plan. She is instructed to go to the emergency room for any further concerns or uncontrolled pain. She was discharged from the urgent care center in good condition. Controlled Substances Monitoring:     RX Monitoring 4/22/2019   Attestation The Prescription Monitoring Report for this patient was reviewed today. Chronic Pain Routine Monitoring Possible medication side effects, risk of tolerance/dependence & alternative treatments discussed. ;No signs of potential drug abuse or diversion identified: otherwise, see note documentation           PATIENT REFERRED TO:  Estefani Lock MD  [address] [place] [address] [address] / [place] [address]      DISCHARGE MEDICATIONS:  Discharge Medication List as of 4/22/2019  8:41 AM      START taking these medications    Details   cephALEXin (KEFLEX) 500 MG capsule Take 1 capsule by mouth 4 times daily for 14 days, Disp-56 capsule, R-0Normal      traMADol (ULTRAM) 50 MG tablet Take 1 tablet by mouth every 4 hours as needed for Pain for up to 2 days. Intended supply: 3 days.  Take lowest dose possible to manage pain, Disp-12 tablet, R-0Print             Discharge Medication List as of 4/22/2019  8:41 AM      STOP taking these medications       cyclobenzaprine (FLEXERIL) 10 MG tablet Comments:   Reason for Stopping:         metFORMIN (GLUCOPHAGE) 500 MG tablet Comments:   Reason for Stopping:               Discharge Medication List as of 4/22/2019  8:41 AM          Julia Valdez APRN - ASHIA    (Please note that portions of this note were completed with a voice recognition program. Efforts were made to edit the dictations but occasionally words are mis-transcribed.) Cristal Holloway Case, APRN - CNP  04/22/19 [id number]

## 2019-04-22 NOTE — ED TRIAGE NOTES
Pt to [facility] ambulatory with mid back pain and drainage. Pt has a history of staph infections.

## 2019-04-24 ENCOUNTER — HOSPITAL ENCOUNTER (OUTPATIENT)
Dept: WOUND CARE | Age: 54
Discharge: HOME | End: 2019-04-24
Payer: COMMERCIAL

## 2019-04-24 VITALS
HEART RATE: 103 BPM | RESPIRATION RATE: 16 BRPM | SYSTOLIC BLOOD PRESSURE: 142 MMHG | DIASTOLIC BLOOD PRESSURE: 80 MMHG | TEMPERATURE: 98.3 F | OXYGEN SATURATION: 97 %

## 2019-04-24 LAB
AEROBIC CULTURE: ABNORMAL
GRAM STAIN RESULT: ABNORMAL
ORGANISM: ABNORMAL

## 2019-04-24 PROCEDURE — 99212 OFFICE O/P EST SF 10 MIN: CPT

## 2019-04-24 ASSESSMENT — PAIN SCALES - GENERAL: PAINLEVEL_OUTOF10: 5

## 2019-04-24 ASSESSMENT — PAIN DESCRIPTION - LOCATION: LOCATION: BACK

## 2019-04-24 ASSESSMENT — PAIN DESCRIPTION - PAIN TYPE: TYPE: ACUTE PAIN

## 2019-04-24 ASSESSMENT — PAIN DESCRIPTION - DESCRIPTORS: DESCRIPTORS: ACHING

## 2019-04-24 ASSESSMENT — PAIN DESCRIPTION - FREQUENCY: FREQUENCY: CONTINUOUS

## 2019-04-24 NOTE — PROGRESS NOTES
STIMULATOR IMPLANTATION N/A 2/22/2019    T7-8 SPINAL CORD STIMULATOR IMPLANT performed by Ginny Donahue MD at [facility] N/A 3/21/2019    THORACIC I&D WITH SPINAL STIMULATOR AND BATTERY REMOVAL performed by Ginny Donahue MD at [facility]  [address]  2/28/13    c5, c6, c7    SPINAL FUSION  10/03/2017     FAMILY HISTORY         Family History   Problem Relation Age of Onset    Arthritis Mother     Cancer Mother     High Blood Pressure Mother     Stroke Mother     Heart Attack Maternal Grandmother     Heart Attack Maternal Grandfather     Early Death Father 25        [de-identified]    Diabetes Neg Hx     Heart Disease Neg Hx      SOCIAL HISTORY       Social History     Tobacco Use    Smoking status: Never Smoker    Smokeless tobacco: Never Used   Substance Use Topics    Alcohol use: Yes     Alcohol/week: 0.0 oz     Comment: very rare    Drug use: No     ALLERGIES         Allergies   Allergen Reactions    Iv Dye [Iodides] Anaphylaxis    Ampicillin Rash    Vancomycin Itching, Rash and Other (See Comments)     Tingling in lips     MEDICATIONS         Current Outpatient Medications on File Prior to Encounter   Medication Sig Dispense Refill    cephALEXin (KEFLEX) 500 MG capsule Take 1 capsule by mouth 4 times daily for 14 days 56 capsule 0    traMADol (ULTRAM) 50 MG tablet Take 1 tablet by mouth every 4 hours as needed for Pain for up to 2 days. Intended supply: 3 days.  Take lowest dose possible to manage pain 12 tablet 0    ketorolac (TORADOL) 10 MG tablet Take 1 tablet by mouth every 6 hours as needed for Pain 16 tablet 0    buPROPion (WELLBUTRIN) 75 MG tablet Take 75 mg by mouth daily      spironolactone-hydrochlorothiazide (ALDACTAZIDE) 25-25 MG per tablet take 1 tablet by mouth once daily 90 tablet 1    metoprolol succinate (TOPROL XL) 25 MG extended release tablet Take 1 tablet by mouth daily 90 tablet 1    CPAP Machine MISC by Does not apply route bleeding  Extremities - No edema, no cyanosis, clubbing   She has a 1 cm vertical incision at the distal end of the surgical wound, blood was noted, no purulent drainage was noted there was no redness or swelling    Incision 11/09/16 Abdomen (Active)   Number of days: 896       LABS      Lab Results   Component Value Date    BC No growth-preliminary  No growth   03/20/2019       Assessment:   Spinal cord stimulator infection with MSSA: Started on oral Keflex due to open wound with  drainage. Follow-up will be scheduled in 2 weeks. If she has persistent drainage will scan to make sure there is no deep seated infection. Patient Active Problem List   Diagnosis Code    GERD (gastroesophageal reflux disease) K21.9    Dysphonia R49.0    Dysphagia R13.10    Left thyroid nodule E04.1    Pyelonephritis N12    Leukocytosis D72.829    Hypertension I10    Weakness R53.1    Depression F32.9    History of fusion of cervical spine Z98.1    Status post partial thyroidectomy Z98.890    Presbyesophagus K22.8    Obesity (BMI 30-39. 9) E66.9    Obstructive sleep apnea on CPAP G47.33, Z99.89    EKG abnormalities R94.31    Tachycardia R00.0    Chronic pain syndrome G89.4    Infection of spinal cord stimulator [facility]) T85.733A          Plan:     Patient examined and evaluated    Treatment: No orders of the defined types were placed in this encounter. Antibiotics: keflex for two wks       Please see attached Discharge Instructions    Written patient dismissal instructions given to patient and signed by patient or POA.              Electronically signed by Etelvina Lock MD on 4/24/2019 at 11:25 AM

## 2019-04-24 NOTE — PLAN OF CARE
Pt. Presented today for a back wound. Pt states she was seen in the ER on Monday. Wound culture taken and pt placed on keflex for 2 weeks. Pt to continue taking antibiotics and keep the area covered. Follow up in 2 weeks. Care plan reviewed with patient. Patient verbalize understanding of the plan of care and contribute to goal setting.

## 2019-04-25 ENCOUNTER — APPOINTMENT (OUTPATIENT)
Dept: CT IMAGING | Age: 54
End: 2019-04-25
Payer: COMMERCIAL

## 2019-04-25 ENCOUNTER — HOSPITAL ENCOUNTER (EMERGENCY)
Age: 54
Discharge: HOME | End: 2019-04-26
Payer: COMMERCIAL

## 2019-04-25 ENCOUNTER — APPOINTMENT (OUTPATIENT)
Dept: ULTRASOUND IMAGING | Age: 54
End: 2019-04-25
Payer: COMMERCIAL

## 2019-04-25 VITALS
WEIGHT: 225 LBS | HEIGHT: 65 IN | BODY MASS INDEX: 37.49 KG/M2 | DIASTOLIC BLOOD PRESSURE: 92 MMHG | SYSTOLIC BLOOD PRESSURE: 157 MMHG | HEART RATE: 89 BPM | OXYGEN SATURATION: 93 % | TEMPERATURE: 98.1 F | RESPIRATION RATE: 25 BRPM

## 2019-04-25 DIAGNOSIS — R10.11 ABDOMINAL PAIN, RIGHT UPPER QUADRANT: ICD-10-CM

## 2019-04-25 DIAGNOSIS — K82.8 GALLBLADDER SLUDGE: Primary | ICD-10-CM

## 2019-04-25 LAB
ALBUMIN SERPL-MCNC: 4.1 G/DL (ref 3.5–5.1)
ALP BLD-CCNC: 86 U/L (ref 38–126)
ALT SERPL-CCNC: 88 U/L (ref 11–66)
ANION GAP SERPL CALCULATED.3IONS-SCNC: 15 MEQ/L (ref 8–16)
AST SERPL-CCNC: 67 U/L (ref 5–40)
BASOPHILS # BLD: 0.4 %
BASOPHILS ABSOLUTE: 0 THOU/MM3 (ref 0–0.1)
BILIRUB SERPL-MCNC: 0.3 MG/DL (ref 0.3–1.2)
BUN BLDV-MCNC: 8 MG/DL (ref 7–22)
CALCIUM SERPL-MCNC: 9.7 MG/DL (ref 8.5–10.5)
CHLORIDE BLD-SCNC: 98 MEQ/L (ref 98–111)
CO2: 27 MEQ/L (ref 23–33)
CREAT SERPL-MCNC: 0.9 MG/DL (ref 0.4–1.2)
EOSINOPHIL # BLD: 1.9 %
EOSINOPHILS ABSOLUTE: 0.2 THOU/MM3 (ref 0–0.4)
ERYTHROCYTE [DISTWIDTH] IN BLOOD BY AUTOMATED COUNT: 12.6 % (ref 11.5–14.5)
ERYTHROCYTE [DISTWIDTH] IN BLOOD BY AUTOMATED COUNT: 37.7 FL (ref 35–45)
GFR SERPL CREATININE-BSD FRML MDRD: 66 ML/MIN/1.73M2
GLUCOSE BLD-MCNC: 130 MG/DL (ref 70–108)
HCT VFR BLD CALC: 35.9 % (ref 37–47)
HEMOGLOBIN: 11.7 GM/DL (ref 12–16)
IMMATURE GRANS (ABS): 0.03 THOU/MM3 (ref 0–0.07)
IMMATURE GRANULOCYTES: 0.4 %
LACTIC ACID: 1.3 MMOL/L (ref 0.5–2.2)
LIPASE: 10.5 U/L (ref 5.6–51.3)
LYMPHOCYTES # BLD: 20 %
LYMPHOCYTES ABSOLUTE: 1.6 THOU/MM3 (ref 1–4.8)
MCH RBC QN AUTO: 27.6 PG (ref 26–33)
MCHC RBC AUTO-ENTMCNC: 32.6 GM/DL (ref 32.2–35.5)
MCV RBC AUTO: 84.7 FL (ref 81–99)
MONOCYTES # BLD: 13 %
MONOCYTES ABSOLUTE: 1 THOU/MM3 (ref 0.4–1.3)
NUCLEATED RED BLOOD CELLS: 0 /100 WBC
OSMOLALITY CALCULATION: 279.5 MOSMOL/KG (ref 275–300)
PLATELET # BLD: 367 THOU/MM3 (ref 130–400)
PMV BLD AUTO: 9.9 FL (ref 9.4–12.4)
POTASSIUM SERPL-SCNC: 3.6 MEQ/L (ref 3.5–5.2)
PROCALCITONIN: 0.12 NG/ML (ref 0.01–0.09)
RBC # BLD: 4.24 MILL/MM3 (ref 4.2–5.4)
SEG NEUTROPHILS: 64.3 %
SEGMENTED NEUTROPHILS ABSOLUTE COUNT: 5.1 THOU/MM3 (ref 1.8–7.7)
SODIUM BLD-SCNC: 140 MEQ/L (ref 135–145)
TOTAL PROTEIN: 8.1 G/DL (ref 6.1–8)
WBC # BLD: 7.9 THOU/MM3 (ref 4.8–10.8)

## 2019-04-25 PROCEDURE — 99215 OFFICE O/P EST HI 40 MIN: CPT

## 2019-04-25 PROCEDURE — 96374 THER/PROPH/DIAG INJ IV PUSH: CPT

## 2019-04-25 PROCEDURE — 6360000002 HC RX W HCPCS: Performed by: PHYSICIAN ASSISTANT

## 2019-04-25 PROCEDURE — 80053 COMPREHEN METABOLIC PANEL: CPT

## 2019-04-25 PROCEDURE — 85025 COMPLETE CBC W/AUTO DIFF WBC: CPT

## 2019-04-25 PROCEDURE — 74176 CT ABD & PELVIS W/O CONTRAST: CPT

## 2019-04-25 PROCEDURE — 83690 ASSAY OF LIPASE: CPT

## 2019-04-25 PROCEDURE — 96361 HYDRATE IV INFUSION ADD-ON: CPT

## 2019-04-25 PROCEDURE — 99284 EMERGENCY DEPT VISIT MOD MDM: CPT

## 2019-04-25 PROCEDURE — 87040 BLOOD CULTURE FOR BACTERIA: CPT

## 2019-04-25 PROCEDURE — 96375 TX/PRO/DX INJ NEW DRUG ADDON: CPT

## 2019-04-25 PROCEDURE — 2580000003 HC RX 258: Performed by: PHYSICIAN ASSISTANT

## 2019-04-25 PROCEDURE — 36415 COLL VENOUS BLD VENIPUNCTURE: CPT

## 2019-04-25 PROCEDURE — 76705 ECHO EXAM OF ABDOMEN: CPT

## 2019-04-25 PROCEDURE — 84145 PROCALCITONIN (PCT): CPT

## 2019-04-25 PROCEDURE — 83605 ASSAY OF LACTIC ACID: CPT

## 2019-04-25 PROCEDURE — 96376 TX/PRO/DX INJ SAME DRUG ADON: CPT

## 2019-04-25 RX ORDER — 0.9 % SODIUM CHLORIDE 0.9 %
1000 INTRAVENOUS SOLUTION INTRAVENOUS ONCE
Status: COMPLETED | OUTPATIENT
Start: 2019-04-25 | End: 2019-04-25

## 2019-04-25 RX ORDER — ONDANSETRON 2 MG/ML
4 INJECTION INTRAMUSCULAR; INTRAVENOUS ONCE
Status: COMPLETED | OUTPATIENT
Start: 2019-04-25 | End: 2019-04-25

## 2019-04-25 RX ORDER — MORPHINE SULFATE 4 MG/ML
4 INJECTION, SOLUTION INTRAMUSCULAR; INTRAVENOUS ONCE
Status: COMPLETED | OUTPATIENT
Start: 2019-04-25 | End: 2019-04-25

## 2019-04-25 RX ADMIN — ONDANSETRON 4 MG: 2 INJECTION INTRAMUSCULAR; INTRAVENOUS at 20:10

## 2019-04-25 RX ADMIN — MORPHINE SULFATE 4 MG: 4 INJECTION INTRAVENOUS at 20:11

## 2019-04-25 RX ADMIN — SODIUM CHLORIDE 1000 ML: 9 INJECTION, SOLUTION INTRAVENOUS at 20:16

## 2019-04-25 RX ADMIN — MORPHINE SULFATE 4 MG: 4 INJECTION INTRAVENOUS at 22:19

## 2019-04-25 ASSESSMENT — ENCOUNTER SYMPTOMS
CHOKING: 0
CONSTIPATION: 0
BACK PAIN: 1
ABDOMINAL PAIN: 1
NAUSEA: 1
CHEST TIGHTNESS: 0
VOMITING: 0
RHINORRHEA: 0
COUGH: 0
SHORTNESS OF BREATH: 0
SINUS PAIN: 0
BLOOD IN STOOL: 0
ABDOMINAL DISTENTION: 0
DIARRHEA: 0
SORE THROAT: 0
COLOR CHANGE: 0

## 2019-04-25 ASSESSMENT — PAIN SCALES - GENERAL
PAINLEVEL_OUTOF10: 3
PAINLEVEL_OUTOF10: 10
PAINLEVEL_OUTOF10: 10
PAINLEVEL_OUTOF10: 8
PAINLEVEL_OUTOF10: 10

## 2019-04-25 ASSESSMENT — PAIN DESCRIPTION - LOCATION
LOCATION: ABDOMEN
LOCATION: ABDOMEN

## 2019-04-25 ASSESSMENT — PAIN DESCRIPTION - PAIN TYPE
TYPE: ACUTE PAIN
TYPE: ACUTE PAIN

## 2019-04-25 ASSESSMENT — PAIN - FUNCTIONAL ASSESSMENT: PAIN_FUNCTIONAL_ASSESSMENT: PREVENTS OR INTERFERES SOME ACTIVE ACTIVITIES AND ADLS

## 2019-04-25 ASSESSMENT — PAIN DESCRIPTION - ONSET: ONSET: GRADUAL

## 2019-04-25 ASSESSMENT — PAIN DESCRIPTION - ORIENTATION
ORIENTATION: ANTERIOR;RIGHT;UPPER
ORIENTATION: RIGHT;UPPER

## 2019-04-25 ASSESSMENT — PAIN DESCRIPTION - PROGRESSION: CLINICAL_PROGRESSION: GRADUALLY WORSENING

## 2019-04-25 ASSESSMENT — PAIN DESCRIPTION - FREQUENCY: FREQUENCY: CONTINUOUS

## 2019-04-25 ASSESSMENT — PAIN DESCRIPTION - DESCRIPTORS: DESCRIPTORS: ACHING;SORE

## 2019-04-25 NOTE — ED NOTES
Report to Mahogany Jernigan, charge nurse at [facility] ED by YORDAN Valdez CNP.      Nabor Marsh, RN  04/25/19 0690

## 2019-04-25 NOTE — ED NOTES
Pt to [facility] ED via private car with  in stable condition.      Reina Castellanos, RN  04/25/19 2669

## 2019-04-25 NOTE — ED PROVIDER NOTES
[address] [facility] [address] [address] EMERGENCY DEPT  Urgent Care Encounter       CHIEF COMPLAINT       Chief Complaint   Patient presents with    Abdominal Pain    Nausea       Nurses Notes reviewed and I agree except as noted in the HPI. HISTORY OF PRESENT ILLNESS   Jody Sauceda is a 47 y.o. female who presents with complaints of right upper quadrant abdominal pain, onset for 5 days ago. Patient states the pain is been off and on over that time. Pain is described as a stabbing under her right lower rib cage. The pain became worse this afternoon after eating lunch. Appetite has been decreased. She reports occasional chills and states she did have a fever this past Saturday. She has had nausea intermittently when the pain is present but no vomiting and no diarrhea. She has not had her gallbladder removed in the past.    The history is provided by the patient. REVIEW OF SYSTEMS     Review of Systems   Constitutional: Positive for appetite change. Negative for chills, fatigue and fever. Respiratory: Negative for cough and shortness of breath. Cardiovascular: Negative for chest pain. Gastrointestinal: Positive for abdominal pain and nausea. Negative for abdominal distention, blood in stool, diarrhea and vomiting. See HPI   Genitourinary: Negative for decreased urine volume. Musculoskeletal: Negative for myalgias. Neurological: Negative for dizziness and headaches. PAST MEDICAL HISTORY         Diagnosis Date    Chronic back pain     Depression 3/24/2013    Diabetes mellitus ([address]     GERD (gastroesophageal reflux disease)     Hypertension     Obesity 3/24/2013    HENRY on CPAP     Osteoarthritis     PONV (postoperative nausea and vomiting)     Pyelonephritis 3/24/2013    Unspecified sleep apnea     with C-Pap    UTI (urinary tract infection)        SURGICALHISTORY     Patient  has a past surgical history that includes Anterior cruciate ligament repair (Left, 2002);  Endometrial ablation (2008); cyst removal (2000); Lobectomy, Thyroid (5/21/10); Spinal fusion (2/28/13); back surgery; Hysterectomy, total abdominal (11/09/2016); Spinal fusion (10/03/2017); Nasal sinus surgery (11/2017); back surgery (05/01/2018); Dilatation, esophagus; spinal cord stimulator implantation (N/A, 2/22/2019); and spinal cord stimulator implantation (N/A, 3/21/2019). CURRENT MEDICATIONS       Current Discharge Medication List      CONTINUE these medications which have NOT CHANGED    Details   cephALEXin (KEFLEX) 500 MG capsule Take 1 capsule by mouth 4 times daily for 14 days  Qty: 56 capsule, Refills: 0      ketorolac (TORADOL) 10 MG tablet Take 1 tablet by mouth every 6 hours as needed for Pain  Qty: 16 tablet, Refills: 0    Associated Diagnoses: Rib pain on right side      buPROPion (WELLBUTRIN) 75 MG tablet Take 75 mg by mouth daily      spironolactone-hydrochlorothiazide (ALDACTAZIDE) 25-25 MG per tablet take 1 tablet by mouth once daily  Qty: 90 tablet, Refills: 1      metoprolol succinate (TOPROL XL) 25 MG extended release tablet Take 1 tablet by mouth daily  Qty: 90 tablet, Refills: 1      ibuprofen (ADVIL;MOTRIN) 600 MG tablet Take 1 tablet by mouth every 6 hours as needed for Pain  Qty: 60 tablet, Refills: 1      linaclotide (LINZESS) 145 MCG capsule Take 145 mcg by mouth every morning (before breakfast)      omeprazole (PRILOSEC) 20 MG capsule Take 20 mg by mouth daily       CPAP Machine MISC by Does not apply route Please change CPAP pressure to 12 cm H20. Qty: 1 each, Refills: 0             ALLERGIES     Patient is is allergic to iv dye [iodides]; ampicillin; and vancomycin. Patients   There is no immunization history on file for this patient.     FAMILY HISTORY     Patient's family history includes Arthritis in her mother; Cancer in her mother; Early Death (age of onset: 25) in her father; Heart Attack in her maternal grandfather and maternal grandmother; High Blood Pressure in her mother; Stroke in her mother. SOCIAL HISTORY     Patient  reports that she has never smoked. She has never used smokeless tobacco. She reports that she drinks alcohol. She reports that she does not use drugs. PHYSICAL EXAM     ED TRIAGE VITALS  BP: 125/84, Temp: 98.1 °F (36.7 °C), Pulse: 108, Resp: 20, SpO2: 96 %,Estimated body mass index is 37.44 kg/m² as calculated from the following:    Height as of this encounter: 5' 5\" (1.651 m). Weight as of this encounter: 225 lb (102.1 kg). ,No LMP recorded. Patient has had a hysterectomy. Physical Exam   Constitutional: She is oriented to person, place, and time. She appears well-developed and well-nourished. She is cooperative. She appears distressed. HENT:   Head: Normocephalic and atraumatic. Cardiovascular: Regular rhythm and normal heart sounds. Tachycardia present. Pulmonary/Chest: Effort normal and breath sounds normal.   Abdominal: Soft. Normal appearance and bowel sounds are normal. She exhibits no distension and no mass. There is tenderness in the right upper quadrant. There is guarding and positive Luciano's sign. There is no rigidity and no rebound. Neurological: She is alert and oriented to person, place, and time. Skin: Skin is warm and dry. Psychiatric: She has a normal mood and affect. Her speech is normal and behavior is normal. Thought content normal.   Nursing note and vitals reviewed. DIAGNOSTIC RESULTS     Labs:No results found for this visit on 04/25/19. IMAGING:    No orders to display         URGENT CARE COURSE:     Vitals:    04/25/19 1738   BP: 125/84   Pulse: 108   Resp: 20   Temp: 98.1 °F (36.7 °C)   TempSrc: Temporal   SpO2: 96%   Weight: 225 lb (102.1 kg)   Height: 5' 5\" (1.651 m)       Medications - No data to display         PROCEDURES:  None    FINAL IMPRESSION      1. Abdominal pain, right upper quadrant          DISPOSITION/ PLAN     Patient presents with complaints of right upper quadrant abdominal pain.   This pain has been present for the past 4-5 days. Pain became worse today after lunch. She does have positive Luciano's sign. Symptoms are concerning for acute cholecystitis. Patient will need further evaluation in the emergency room. This was explained to the patient and she did agree with the transfer. Patient is stable will travel by private car. Report was called to the charge nurse in the emergency room. All questions were answered and the patient left the urgent care in good condition.       PATIENT REFERRED TO:  Sheng Chavez MD  [address] [facility] / [place] [address]      DISCHARGE MEDICATIONS:  Current Discharge Medication List          Current Discharge Medication List          Current Discharge Medication List          Peter Moseley SILVERIO Valdez CNP    (Please note that portions of this note were completed with a voice recognition program. Efforts were made to edit the dictations but occasionally words are mis-transcribed.)         Peter Moseley SILVERIO Valdez CNP  04/25/19 7557

## 2019-04-25 NOTE — ED TRIAGE NOTES
Pt to [facility] ambulatory with right, upper quadrant abdominal pain. Pain started on Saturday. Pt nauseated at times. No vomiting.

## 2019-04-25 NOTE — ED NOTES
Pt. Arrives to the ED with 9/10 pain in the upper right quadrant. Pt. Was hunched over and holding her right side. Pt. States she has been having this pain since Saturday has progressively worsened. Pt. Resting in bed with even and unlabored.         Jairo Gomez RN  04/25/19 1942

## 2019-04-26 RX ORDER — OXYCODONE HYDROCHLORIDE AND ACETAMINOPHEN 5; 325 MG/1; MG/1
1 TABLET ORAL EVERY 6 HOURS PRN
Qty: 20 TABLET | Refills: 0 | Status: SHIPPED | OUTPATIENT
Start: 2019-04-26 | End: 2019-05-11

## 2019-04-26 RX ORDER — ONDANSETRON 4 MG/1
4 TABLET, ORALLY DISINTEGRATING ORAL EVERY 8 HOURS PRN
Qty: 20 TABLET | Refills: 0 | Status: SHIPPED | OUTPATIENT
Start: 2019-04-26 | End: 2020-01-15

## 2019-04-26 NOTE — ED NOTES
Discharge instructions, medications and follow-up care reviewed. Pt. Verbalized understanding and states she has no further questions or  Concerns at this time.       Jairo Gomez RN  04/26/19 0020

## 2019-04-26 NOTE — ED PROVIDER NOTES
[facility]  eMERGENCY dEPARTMENT eNCOUnter          CHIEF COMPLAINT       Chief Complaint   Patient presents with    Abdominal Pain    Nausea       Nurses Notes reviewed and I agree except as noted inthe HPI. HISTORY OF PRESENT ILLNESS    Neal Fairbanks is a 47 y.o. female who presents to the Emergency Department for the evaluation of RUQ abdominal pain. Patient reports occasional post-prandial RUQ pain for the past 2 months without evaluation. The patient states that her current pain came on gradually 5 days ago and that it has gotten progressively worse since that time. She describes her pain as a sharp, achy pain that radiates to her back and is constant and rated at 10/10. The patient reports that the pain is worse after eating. She has taken 2 Toradol today with no relief of pain. She did have a fever with onset of the pain but denies fever since that time. She does report nausea and decreased appetite. Denies vomiting, diarrhea, blood in the stool, jaundice, constipation, chest pain, or urinary symptoms. The HPI was provided by the patient. REVIEW OF SYSTEMS     Review of Systems   Constitutional: Positive for appetite change (Decreased appetite) and fever (5 days ago when pain started). HENT: Negative for congestion, ear pain, postnasal drip, rhinorrhea, sinus pain and sore throat. Respiratory: Negative for cough, choking, chest tightness and shortness of breath. Cardiovascular: Negative for chest pain and leg swelling. Gastrointestinal: Positive for abdominal pain (RUQ) and nausea. Negative for abdominal distention, blood in stool, constipation, diarrhea and vomiting. Genitourinary: Negative for difficulty urinating, dysuria, flank pain, frequency, hematuria and urgency. Musculoskeletal: Positive for back pain (Radiation from RUQ). Skin: Negative for color change and pallor. Neurological: Negative for dizziness, light-headedness and headaches.        PAST MEDICAL HISTORY    has a past medical history of Chronic back pain, Depression, Diabetes mellitus ([address] GERD (gastroesophageal reflux disease), Hypertension, Obesity, HENRY on CPAP, Osteoarthritis, PONV (postoperative nausea and vomiting), Pyelonephritis, Unspecified sleep apnea, and UTI (urinary tract infection). SURGICAL HISTORY      has a past surgical history that includes Anterior cruciate ligament repair (Left, 2002); Endometrial ablation (2008); cyst removal (2000); Lobectomy, Thyroid (5/21/10); Spinal fusion (2/28/13); back surgery; Hysterectomy, total abdominal (11/09/2016); Spinal fusion (10/03/2017); Nasal sinus surgery (11/2017); back surgery (05/01/2018); Dilatation, esophagus; spinal cord stimulator implantation (N/A, 2/22/2019); and spinal cord stimulator implantation (N/A, 3/21/2019). CURRENT MEDICATIONS       Discharge Medication List as of 4/26/2019 12:09 AM      CONTINUE these medications which have NOT CHANGED    Details   cephALEXin (KEFLEX) 500 MG capsule Take 1 capsule by mouth 4 times daily for 14 days, Disp-56 capsule, R-0Normal      ketorolac (TORADOL) 10 MG tablet Take 1 tablet by mouth every 6 hours as needed for Pain, Disp-16 tablet, R-0Print      buPROPion (WELLBUTRIN) 75 MG tablet Take 75 mg by mouth dailyHistorical Med      spironolactone-hydrochlorothiazide (ALDACTAZIDE) 25-25 MG per tablet take 1 tablet by mouth once daily, Disp-90 tablet, R-1Normal      metoprolol succinate (TOPROL XL) 25 MG extended release tablet Take 1 tablet by mouth daily, Disp-90 tablet, R-1Normal      ibuprofen (ADVIL;MOTRIN) 600 MG tablet Take 1 tablet by mouth every 6 hours as needed for Pain, Disp-60 tablet, R-1      linaclotide (LINZESS) 145 MCG capsule Take 145 mcg by mouth every morning (before breakfast)      omeprazole (PRILOSEC) 20 MG capsule Take 20 mg by mouth daily       CPAP Machine MISC Starting Mon 10/9/2017, Disp-1 each, R-0, PrintPlease change CPAP pressure to 12 cm H20. ALLERGIES     is allergic to iv dye [iodides]; ampicillin; and vancomycin. FAMILY HISTORY     indicated that her mother is alive. She indicated that her father is . She indicated that her brother is alive. She indicated that her maternal grandmother is . She indicated that her maternal grandfather is . She indicated that her paternal grandmother is . She indicated that her paternal grandfather is . She indicated that the status of her neg hx is unknown.   family history includes Arthritis in her mother; Cancer in her mother; Early Death (age of onset: 25) in her father; Heart Attack in her maternal grandfather and maternal grandmother; High Blood Pressure in her mother; Stroke in her mother. SOCIAL HISTORY      reports that she has never smoked. She has never used smokeless tobacco. She reports that she drinks alcohol. She reports that she does not use drugs. PHYSICAL EXAM     INITIAL VITALS:  height is 5' 5\" (1.651 m) and weight is 225 lb (102.1 kg). Her oral temperature is 98.1 °F (36.7 °C). Her blood pressure is 157/92 (abnormal) and her pulse is 89. Her respiration is 25 and oxygen saturation is 93%. Physical Exam   Constitutional: She is oriented to person, place, and time. She appears well-developed and well-nourished. uncomfortable   HENT:   Head: Normocephalic and atraumatic. Right Ear: External ear normal.   Left Ear: External ear normal.   Eyes: Pupils are equal, round, and reactive to light. Conjunctivae and EOM are normal. No scleral icterus. Neck: Normal range of motion. Cardiovascular: Normal rate, regular rhythm and normal heart sounds. Pulmonary/Chest: Effort normal and breath sounds normal. No stridor. No respiratory distress. She has no wheezes. She has no rales. Abdominal: Soft. Bowel sounds are normal. She exhibits no distension and no mass. There is tenderness in the right upper quadrant. There is positive Luciano's sign.  There is no rigidity, no guarding and no CVA tenderness. Musculoskeletal: Normal range of motion. Neurological: She is alert and oriented to person, place, and time. Skin: Skin is warm and dry. Psychiatric: She has a normal mood and affect. Thought content normal.       DIFFERENTIAL DIAGNOSIS:   Differential diagnoses are discussed    DIAGNOSTIC RESULTS     EKG: All EKG's are interpreted by the Emergency Department Physician who either signs or Co-signsthis chart in the absence of a cardiologist.          RADIOLOGY: non-plain film images(s) such as CT, Ultrasound and MRI are read by the radiologist.    CT ABDOMEN PELVIS WO CONTRAST Additional Contrast? None   Final Result   1. Negative exam for obstructive uropathy or obvious source of right flank pain. 2. Diffuse stool retention throughout the colon compatible with moderate constipation. 3. Postsurgical instrumentation of the lumbar spine. 4. General fatty liver changes. **This report has been created using voice recognition software. It may contain minor errors which are inherent in voice recognition technology. **      Final report electronically signed by Dr. Giovana Fields on 4/25/2019 11:39 PM      [id number] [employer]   Final Result   1. Hepatomegaly with fatty liver changes. 2. Minimal gallbladder sludge retention. **This report has been created using voice recognition software. It may contain minor errors which are inherent in voice recognition technology. **      Final report electronically signed by Dr. Giovana Fields on 4/25/2019 10:38 PM          LABS:      Labs Reviewed   CBC WITH AUTO DIFFERENTIAL - Abnormal; Notable for the following components:       Result Value    Hemoglobin 11.7 (*)     Hematocrit 35.9 (*)     All other components within normal limits   COMPREHENSIVE METABOLIC PANEL - Abnormal; Notable for the following components:    Glucose 130 (*)     AST 67 (*)     Total Protein 8.1 (*)     ALT 88 (*)     All other DISPOSITION/PLAN   Discharge    PATIENT REFERRED TO:  Josh Coy MD  [address] [facility]  [phone]    Schedule an appointment as soon as possible for a visit   As needed    Ernie Altamirano MD  [place] [address]  [address] [facility]  [phone]    Call in 1 day  for surgical consult and possible HIDA scan    Josh Coy MD  [address] [facility]  [phone]      As needed    [address] [address] EMERGENCY DEPT  [address] [facility]  [phone]    If symptoms worsen      DISCHARGEMEDICATIONS:  Discharge Medication List as of 4/26/2019 12:09 AM      START taking these medications    Details   oxyCODONE-acetaminophen (PERCOCET) 5-325 MG per tablet Take 1 tablet by mouth every 6 hours as needed for Pain for up to 15 days. WARNING: This medication may make you drowsy.  Do not operate heavy machinery or motor vehicles during use., Disp-20 tablet, R-0Print      ondansetron (ZOFRAN ODT) 4 MG disintegrating tablet Take 1 tablet by mouth every 8 hours as needed for Nausea or Vomiting, Disp-20 tablet, R-0Print             (Please note that portions of this note were completedwith a voice recognition program.  Efforts were made to edit the dictations but occasionally words are mis-transcribed.)        Michael Colin PA-C  04/26/19 [id number]

## 2019-04-26 NOTE — ED NOTES
Pt. Resting in bed with TV on and  at bedside. Pt. States her pain is back up to an 8/10. Pt. Given blanket for comfort and updated about plan of care, Pt. Has no further needs or concerns at this time. Call light in reach.       Shaun Watkins RN  04/25/19 [id number]

## 2019-05-01 LAB
BLOOD CULTURE, ROUTINE: NORMAL
BLOOD CULTURE, ROUTINE: NORMAL

## 2019-05-15 ENCOUNTER — OFFICE VISIT (OUTPATIENT)
Dept: SURGERY | Age: 54
End: 2019-05-15
Payer: COMMERCIAL

## 2019-05-15 VITALS
RESPIRATION RATE: 16 BRPM | OXYGEN SATURATION: 96 % | HEART RATE: 94 BPM | WEIGHT: 232 LBS | HEIGHT: 65 IN | BODY MASS INDEX: 38.65 KG/M2 | DIASTOLIC BLOOD PRESSURE: 82 MMHG | TEMPERATURE: 98.1 F | SYSTOLIC BLOOD PRESSURE: 128 MMHG

## 2019-05-15 DIAGNOSIS — E66.9 OBESITY (BMI 30-39.9): ICD-10-CM

## 2019-05-15 DIAGNOSIS — K82.8 GALLBLADDER SLUDGE: Primary | ICD-10-CM

## 2019-05-15 PROCEDURE — 99243 OFF/OP CNSLTJ NEW/EST LOW 30: CPT | Performed by: SURGERY

## 2019-05-15 NOTE — LETTER
[facility]  [facility] [address]  [address]  Phone: [phone]  Fax: [phone]    Jose Ramon Stevenson MD    May 17, 2019     Jessica Payne MD  [address] [place] [address]    Patient: Luke Addison  MR Number: [id number]  YOB: 1965  Date of Visit: 5/15/2019    Dear Dr. Jessica Payne:    I recently saw your patient Ajay Jean in consultation. Below are the relevant portions of my assessment and plan of care. Assessment:  1. Biliary/gallbladder sludge  2. Fatty liver disease  3. Obesity (BMI 38)    Plan:  1. Schedule Toña for laparoscopic cholecystectomy. 2. She will undergo pre-operative clearance per anesthesia guidelines with risk factors listed under the past medical history diagnosis & problem list.  3. The risks, benefits and alternatives were discussed with Toña including non-operative management. The pros and cons of robotic, laparoscopic and open techniques were discussed. All questions answered. She understands and wishes to proceed with surgical intervention. 4. Restrictions discussed with Evelyn and she expresses understanding. 5. She is advised to call back directly if there are further questions/concerns, or if her symptoms worsen prior to surgery. 6.  Nutritional education occurred today. Encouraged weight loss. Dietary modifications/alterations discussed in detail. If you have questions, please do not hesitate to call me. I look forward to following [facility] along with you.     Sincerely,    Jose Ramon Stevenson MD

## 2019-05-17 ASSESSMENT — ENCOUNTER SYMPTOMS
APNEA: 0
EYE REDNESS: 0
SHORTNESS OF BREATH: 0
NAUSEA: 1
BLOOD IN STOOL: 0
DIARRHEA: 0
SORE THROAT: 0
RHINORRHEA: 0
COUGH: 0
RECTAL PAIN: 0
CHEST TIGHTNESS: 0
STRIDOR: 0
VOMITING: 0
PHOTOPHOBIA: 0
WHEEZING: 0
EYE PAIN: 0
BACK PAIN: 0
ANAL BLEEDING: 0
ALLERGIC/IMMUNOLOGIC NEGATIVE: 1
EYE DISCHARGE: 0
CHOKING: 0
TROUBLE SWALLOWING: 0
SINUS PRESSURE: 0
FACIAL SWELLING: 0
CONSTIPATION: 0
COLOR CHANGE: 0
VOICE CHANGE: 0
ABDOMINAL DISTENTION: 0
ABDOMINAL PAIN: 1
EYE ITCHING: 0

## 2019-05-17 NOTE — PROGRESS NOTES
Early Death Father 19        mva    Diabetes Neg Hx     Heart Disease Neg Hx        Social History     Socioeconomic History    Marital status:      Spouse name: Not on file    Number of children: Not on file    Years of education: Not on file    Highest education level: Not on file   Occupational History    Occupation: unemployed at present time   Social Needs    Financial resource strain: Not on file    Food insecurity:     Worry: Not on file     Inability: Not on file   Palmap needs:     Medical: Not on file     Non-medical: Not on file   Tobacco Use    Smoking status: Never Smoker    Smokeless tobacco: Never Used   Substance and Sexual Activity    Alcohol use: Yes     Alcohol/week: 0.0 oz     Comment: very rare    Drug use: Yes     Types: Marijuana     Comment: Medical Marijuana    Sexual activity: Yes     Partners: Male   Lifestyle    Physical activity:     Days per week: Not on file     Minutes per session: Not on file    Stress: Not on file   Relationships    Social connections:     Talks on phone: Not on file     Gets together: Not on file     Attends [employer] service: Not on file     Active member of club or organization: Not on file     Attends meetings of clubs or organizations: Not on file     Relationship status: Not on file    Intimate partner violence:     Fear of current or ex partner: Not on file     Emotionally abused: Not on file     Physically abused: Not on file     Forced sexual activity: Not on file   Other Topics Concern    Not on file   Social History Narrative    Not on file     Vitals:    05/15/19 1008   BP: 128/82   Pulse: 94   Resp: 16   Temp: 98.1 °F (36.7 °C)   SpO2: 96%     Body mass index is 38.61 kg/m². Objective:   Physical Exam   Constitutional: She is oriented to person, place, and time. She appears well-developed and well-nourished. No distress. HENT:   Head: Normocephalic and atraumatic.    Right Ear: External ear normal.   Left Ear: External ear normal.   Nose: Nose normal.   Mouth/Throat: Oropharynx is clear and moist.   Eyes: Conjunctivae and EOM are normal. Right eye exhibits no discharge. Left eye exhibits no discharge. No scleral icterus. Neck: Normal range of motion. Neck supple. Cardiovascular: Normal rate, regular rhythm, normal heart sounds and intact distal pulses. Pulmonary/Chest: Effort normal and breath sounds normal. No respiratory distress. She has no wheezes. She has no rales. She exhibits no tenderness. Abdominal: Soft. Bowel sounds are normal. She exhibits no distension and no mass. There is tenderness (mild) in the right upper quadrant and epigastric area. There is no rebound and no guarding. Musculoskeletal: Normal range of motion. She exhibits no edema or tenderness. Neurological: She is alert and oriented to person, place, and time. No cranial nerve deficit. Skin: Skin is warm and dry. No rash noted. She is not diaphoretic. No erythema. No pallor. Psychiatric: She has a normal mood and affect. Her behavior is normal. Judgment and thought content normal.   Vitals reviewed.     Lab Results   Component Value Date     04/25/2019    K 3.6 04/25/2019    CL 98 04/25/2019    CO2 27 04/25/2019     Lab Results   Component Value Date    CREATININE 0.9 04/25/2019     Lab Results   Component Value Date    WBC 7.9 04/25/2019    HGB 11.7 (L) 04/25/2019    HCT 35.9 (L) 04/25/2019    MCV 84.7 04/25/2019     04/25/2019     Lab Results   Component Value Date    ALT 88 (H) 04/25/2019    AST 67 (H) 04/25/2019    ALKPHOS 86 04/25/2019    BILITOT 0.3 04/25/2019     Lab Results   Component Value Date    LIPASE 10.5 04/25/2019     Imaging -   Narrative   PROCEDURE: US GALLBLADDER RUQ       CLINICAL INFORMATION: RUQ pain.       COMPARISON: No prior study.       TECHNIQUE: Multiplanar sonographic images were obtained of the structures in the right upper quadrant.       FINDINGS:   The liver is enlarged measuring 19.7 cm instrumentation of the lumbar spine. 4. General fatty liver changes.             Patient Active Problem List   Diagnosis    GERD (gastroesophageal reflux disease)    Dysphonia    Dysphagia    Left thyroid nodule    Pyelonephritis    Leukocytosis    Hypertension    Weakness    Depression    History of fusion of cervical spine    Status post partial thyroidectomy    Presbyesophagus    Obesity (BMI 30-39. 9)    Obstructive sleep apnea on CPAP    EKG abnormalities    Tachycardia    Chronic pain syndrome    Infection of spinal cord stimulator (HCC)     Assessment:      1. Biliary/gallbladder sludge  2. Fatty liver disease  3. Obesity (BMI 38)      Plan:      1. Schedule Toña for laparoscopic cholecystectomy. 2. She will undergo pre-operative clearance per anesthesia guidelines with risk factors listed under the past medical history diagnosis & problem list.  3. The risks, benefits and alternatives were discussed with Toña including non-operative management. The pros and cons of robotic, laparoscopic and open techniques were discussed. All questions answered. She understands and wishes to proceed with surgical intervention. 4. Restrictions discussed with Evelyn and she expresses understanding. 5. She is advised to call back directly if there are further questions/concerns, or if her symptoms worsen prior to surgery. 6.  Nutritional education occurred today. Encouraged weight loss. Dietary modifications/alterations discussed in detail.         Sourav Iglesias MD

## 2019-06-13 ENCOUNTER — HOSPITAL ENCOUNTER (OUTPATIENT)
Age: 54
Discharge: HOME | End: 2019-06-13
Payer: COMMERCIAL

## 2019-06-13 LAB
C-REACTIVE PROTEIN: 0.22 MG/DL (ref 0–1)
ERYTHROCYTE [DISTWIDTH] IN BLOOD BY AUTOMATED COUNT: 13 % (ref 11.5–14.5)
ERYTHROCYTE [DISTWIDTH] IN BLOOD BY AUTOMATED COUNT: 39.8 FL (ref 35–45)
HCT VFR BLD CALC: 36.7 % (ref 37–47)
HEMOGLOBIN: 11.4 GM/DL (ref 12–16)
MCH RBC QN AUTO: 26.3 PG (ref 26–33)
MCHC RBC AUTO-ENTMCNC: 31.1 GM/DL (ref 32.2–35.5)
MCV RBC AUTO: 84.6 FL (ref 81–99)
PLATELET # BLD: 359 THOU/MM3 (ref 130–400)
PMV BLD AUTO: 11.2 FL (ref 9.4–12.4)
RBC # BLD: 4.34 MILL/MM3 (ref 4.2–5.4)
SEDIMENTATION RATE, ERYTHROCYTE: 35 MM/HR (ref 0–20)
WBC # BLD: 6.3 THOU/MM3 (ref 4.8–10.8)

## 2019-06-13 PROCEDURE — 85651 RBC SED RATE NONAUTOMATED: CPT

## 2019-06-13 PROCEDURE — 85027 COMPLETE CBC AUTOMATED: CPT

## 2019-06-13 PROCEDURE — 86140 C-REACTIVE PROTEIN: CPT

## 2019-06-13 PROCEDURE — 36415 COLL VENOUS BLD VENIPUNCTURE: CPT

## 2019-06-15 NOTE — H&P
Subjective:   Patient ID: Luigi Guevara is a 47 y.o. female. Referring physician: Dr. Myra Bright        Chief Complaint   Patient presents with    Surgical Consult     New patient-in [facility] ER on 4/25/19-Abdominal pain,Gallbladder sludge     HPI   Francisco Arora is a 59-year-old female who presents for initial evaluation secondary to gallbladder disease. She states she has been having gallbladder attacks that February. Epigastric and right upper quadrant abdominal pain. Moderate to severe. Radiates to the right upper lateral flank/back. Last several minutes to a few hours. Recently required emergency room visit in April due to severity of the attack. All bladder demonstrating biliary sludge. No other acute disease identified on ultrasound or CT imaging. Associated with nausea. Much worse with food intake especially large heavy fatty meals. Improved with food avoidance. No generalized abdominal pain. No emesis. No change with bowel function. No hematochezia or melena. No unexplained weight loss. No fever, chills or sweats. No chest pain or shortness of breath. She states she feels her attacks are becoming more severe and frequent. Review of Systems   Constitutional: Positive for appetite change. Negative for activity change, chills, diaphoresis, fatigue, fever and unexpected weight change. HENT: Negative for congestion, dental problem, drooling, ear discharge, ear pain, facial swelling, hearing loss, mouth sores, nosebleeds, postnasal drip, rhinorrhea, sinus pressure, sneezing, sore throat, tinnitus, trouble swallowing and voice change. Eyes: Negative for photophobia, pain, discharge, redness, itching and visual disturbance. Respiratory: Negative for apnea, cough, choking, chest tightness, shortness of breath, wheezing and stridor. Cardiovascular: Negative for chest pain, palpitations and leg swelling. Gastrointestinal: Positive for abdominal pain and nausea.  Negative for abdominal distention, anal bleeding, blood in stool, constipation, diarrhea, rectal pain and vomiting. Endocrine: Negative. Genitourinary: Negative for decreased urine volume, difficulty urinating, dyspareunia, dysuria, enuresis, flank pain, frequency, genital sores, hematuria, menstrual problem, pelvic pain, urgency, vaginal bleeding, vaginal discharge and vaginal pain. Musculoskeletal: Negative for arthralgias, back pain, gait problem, joint swelling, myalgias, neck pain and neck stiffness. Skin: Negative for color change, pallor, rash and wound. Allergic/Immunologic: Negative. Neurological: Negative for dizziness, tremors, seizures, syncope, facial asymmetry, speech difficulty, weakness, light-headedness, numbness and headaches. Hematological: Negative for adenopathy. Does not bruise/bleed easily. Psychiatric/Behavioral: Negative for agitation, behavioral problems, confusion, decreased concentration, dysphoric mood, hallucinations, self-injury, sleep disturbance and suicidal ideas. The patient is not nervous/anxious and is not hyperactive.      Past Medical History        Past Medical History:   Diagnosis Date    Chronic back pain     Depression 3/24/2013    Diabetes mellitus ([facility] [address] [address]     GERD (gastroesophageal reflux disease)     Hypertension     Obesity 3/24/2013    HENRY on CPAP     Osteoarthritis     PONV (postoperative nausea and vomiting)     Pyelonephritis 3/24/2013    Unspecified sleep apnea     with C-Pap    UTI (urinary tract infection)      Past Surgical History         Past Surgical History:   Procedure Laterality Date    ANTERIOR CRUCIATE LIGAMENT REPAIR Left 2002    BACK SURGERY      lumbar    BACK SURGERY  05/01/2018    herniated disk     CYST REMOVAL  2000    Left wrist, hand and ankle    DILATATION, ESOPHAGUS      ENDOMETRIAL ABLATION  2008    Dr Kaitlyn Hutson, TOTAL ABDOMINAL  11/09/2016    Robotic    LOBECTOMY, THYROID  5/21/10    right lobectomy    NASAL SINUS SURGERY  11/2017 Dr. Aaron Solano in [facility] [address] N/A 2/22/2019    T7-8 SPINAL CORD STIMULATOR IMPLANT performed by Fitz Archer MD at [facility] N/A 3/21/2019    THORACIC I&D WITH SPINAL STIMULATOR AND BATTERY REMOVAL performed by Fitz Archer MD at [address] [place] [address] Ave  2/28/13    c5, c6, c7    SPINAL FUSION  10/03/2017     Current Facility-Administered Medications          Current Outpatient Medications   Medication Sig Dispense Refill    NONFORMULARY Medical marijuna daily      ondansetron (ZOFRAN ODT) 4 MG disintegrating tablet Take 1 tablet by mouth every 8 hours as needed for Nausea or Vomiting 20 tablet 0    ketorolac (TORADOL) 10 MG tablet Take 1 tablet by mouth every 6 hours as needed for Pain 16 tablet 0    buPROPion (WELLBUTRIN) 75 MG tablet Take 75 mg by mouth daily      spironolactone-hydrochlorothiazide (ALDACTAZIDE) 25-25 MG per tablet take 1 tablet by mouth once daily 90 tablet 1    metoprolol succinate (TOPROL XL) 25 MG extended release tablet Take 1 tablet by mouth daily 90 tablet 1    CPAP Machine MISC by Does not apply route Please change CPAP pressure to 12 cm H20. 1 each 0    ibuprofen (ADVIL;MOTRIN) 600 MG tablet Take 1 tablet by mouth every 6 hours as needed for Pain 60 tablet 1    linaclotide (LINZESS) 145 MCG capsule Take 145 mcg by mouth every morning (before breakfast)      omeprazole (PRILOSEC) 20 MG capsule Take 20 mg by mouth daily      No current facility-administered medications for this visit.              Allergies   Allergen Reactions    Iv Dye [Iodides] Anaphylaxis    Shellfish-Derived Products Shortness Of Breath and Itching     Swelling    Shellfish Allergy     Ampicillin Rash    Vancomycin Itching, Rash and Other (See Comments)     Tingling in lips     Family History         Family History   Problem Relation Age of Onset    Arthritis Mother     Cancer Mother     High Blood Pressure Mother     Stroke Mother     Heart Attack Maternal Grandmother     Heart Attack Maternal Grandfather     Early Death Father 25    [de-identified]    Diabetes Neg Hx     Heart Disease Neg Hx      Social History         Socioeconomic History    Marital status:      Spouse name: Not on file    Number of children: Not on file    Years of education: Not on file    Highest education level: Not on file   Occupational History    Occupation: unemployed at present time   Social Needs    Financial resource strain: Not on file    Food insecurity:     Worry: Not on file     Inability: Not on file   Brainloop needs:     Medical: Not on file     Non-medical: Not on file   Tobacco Use    Smoking status: Never Smoker    Smokeless tobacco: Never Used   Substance and Sexual Activity    Alcohol use: Yes     Alcohol/week: 0.0 oz     Comment: very rare    Drug use: Yes     Types: Marijuana     Comment: Medical Marijuana    Sexual activity: Yes     Partners: Male   Lifestyle    Physical activity:     Days per week: Not on file     Minutes per session: Not on file    Stress: Not on file   Relationships    Social connections:     Talks on phone: Not on file     Gets together: Not on file     Attends Jew service: Not on file     Active member of club or organization: Not on file     Attends meetings of clubs or organizations: Not on file     Relationship status: Not on file    Intimate partner violence:     Fear of current or ex partner: Not on file     Emotionally abused: Not on file     Physically abused: Not on file     Forced sexual activity: Not on file   Other Topics Concern    Not on file   Social History Narrative    Not on file         Vitals:    05/15/19 1008   BP: 128/82   Pulse: 94   Resp: 16   Temp: 98.1 °F (36.7 °C)   SpO2: 96%     Body mass index is 38.61 kg/m². Objective:   Physical Exam   Constitutional: She is oriented to person, place, and time.  She appears well-developed and well-nourished. No distress. HENT:   Head: Normocephalic and atraumatic. Right Ear: External ear normal.   Left Ear: External ear normal.   Nose: Nose normal.   Mouth/Throat: Oropharynx is clear and moist.   Eyes: Conjunctivae and EOM are normal. Right eye exhibits no discharge. Left eye exhibits no discharge. No scleral icterus. Neck: Normal range of motion. Neck supple. Cardiovascular: Normal rate, regular rhythm, normal heart sounds and intact distal pulses. Pulmonary/Chest: Effort normal and breath sounds normal. No respiratory distress. She has no wheezes. She has no rales. She exhibits no tenderness. Abdominal: Soft. Bowel sounds are normal. She exhibits no distension and no mass. There is tenderness (mild) in the right upper quadrant and epigastric area. There is no rebound and no guarding. Musculoskeletal: Normal range of motion. She exhibits no edema or tenderness. Neurological: She is alert and oriented to person, place, and time. No cranial nerve deficit. Skin: Skin is warm and dry. No rash noted. She is not diaphoretic. No erythema. No pallor. Psychiatric: She has a normal mood and affect. Her behavior is normal. Judgment and thought content normal.   Vitals reviewed. Lab Results   Component Value Date     04/25/2019    K 3.6 04/25/2019    CL 98 04/25/2019    CO2 27 04/25/2019           Lab Results   Component Value Date    CREATININE 0.9 04/25/2019           Lab Results   Component Value Date    WBC 7.9 04/25/2019    HGB 11.7 (L) 04/25/2019    HCT 35.9 (L) 04/25/2019    MCV 84.7 04/25/2019     04/25/2019           Lab Results   Component Value Date    ALT 88 (H) 04/25/2019    AST 67 (H) 04/25/2019    ALKPHOS 86 04/25/2019    BILITOT 0.3 04/25/2019           Lab Results   Component Value Date    LIPASE 10.5 04/25/2019     Imaging -   Narrative   PROCEDURE: US GALLBLADDER RUQ      CLINICAL INFORMATION: RUQ pain. COMPARISON: No prior study.       TECHNIQUE: Multiplanar sonographic images were obtained of the structures in the right upper quadrant. FINDINGS:   The liver is enlarged measuring 19.7 cm with coarse echogenicity. There is no focal mass or biliary distention. Doppler assessment was not performed. The gallbladder is negative for stones. Minimal sludge within the lumen is not excluded. There is no obvious wall thickening or pericholecystic fluid. The common bile duct measures 3.9 mm. Luciano's assessment is negative. Impression   1. Hepatomegaly with fatty liver changes. 2. Minimal gallbladder sludge retention. **This report has been created using voice recognition software. It may contain minor errors which are inherent in voice recognition technology. **        Narrative   PROCEDURE: CT ABDOMEN PELVIS WO CONTRAST      CLINICAL INFORMATION: right flank pain . COMPARISON: March 25, 2013      TECHNIQUE: Axial 5 mm CT images were obtained through the abdomen and pelvis. No contrast was given. Coronal reconstructions were obtained. All CT scans at this facility use dose modulation, iterative reconstruction, and/or weight-based dosing when appropriate to reduce radiation dose to as low as reasonably achievable. FINDINGS:   The lung bases are negative for active pathology. There is diffuse fatty appearance of liver. Mild sludge within the gallbladder lumen is noted with no wall thickening or evidence of biliary obstruction. The pancreas and spleen are negative. Left and right kidney are normal in morphology with no perinephric changes. The urinary bladder is distended but appears clear. The small bowel is unremarkable. There is moderate stool retained diffusely throughout the colon compatible with constipation    changes. The appendix is normal. Postsurgical instrumentation of L2-S1 is noted. Posterior decompressing laminectomy changes are present. Impression   1.  Negative exam for obstructive uropathy or obvious source of right flank pain. 2. Diffuse stool retention throughout the colon compatible with moderate constipation. 3. Postsurgical instrumentation of the lumbar spine. 4. General fatty liver changes. Patient Active Problem List   Diagnosis    GERD (gastroesophageal reflux disease)    Dysphonia    Dysphagia    Left thyroid nodule    Pyelonephritis    Leukocytosis    Hypertension    Weakness    Depression    History of fusion of cervical spine    Status post partial thyroidectomy    Presbyesophagus    Obesity (BMI 30-39. 9)    Obstructive sleep apnea on CPAP    EKG abnormalities    Tachycardia    Chronic pain syndrome    Infection of spinal cord stimulator (HCC)     Assessment:     1. Biliary/gallbladder sludge   2. Fatty liver disease   3. Obesity (BMI 38)     Plan:     1. Schedule Toña for laparoscopic cholecystectomy. 2. She will undergo pre-operative clearance per anesthesia guidelines with risk factors listed under the past medical history diagnosis & problem list.   3. The risks, benefits and alternatives were discussed with Toña including non-operative management. The pros and cons of robotic, laparoscopic and open techniques were discussed. All questions answered. She understands and wishes to proceed with surgical intervention. 4. Restrictions discussed with Evelyn and she expresses understanding. 5. She is advised to call back directly if there are further questions/concerns, or if her symptoms worsen prior to surgery. 6. Nutritional education occurred today. Encouraged weight loss. Dietary modifications/alterations discussed in detail.      Rafaela Osler, MD

## 2019-06-18 ENCOUNTER — ANESTHESIA (OUTPATIENT)
Dept: OPERATING ROOM | Age: 54
End: 2019-06-18
Payer: COMMERCIAL

## 2019-06-18 ENCOUNTER — ANESTHESIA EVENT (OUTPATIENT)
Dept: OPERATING ROOM | Age: 54
End: 2019-06-18
Payer: COMMERCIAL

## 2019-06-18 ENCOUNTER — HOSPITAL ENCOUNTER (OUTPATIENT)
Age: 54
Setting detail: OUTPATIENT SURGERY
Discharge: HOME | End: 2019-06-18
Attending: SURGERY | Admitting: SURGERY
Payer: COMMERCIAL

## 2019-06-18 VITALS
TEMPERATURE: 97.6 F | SYSTOLIC BLOOD PRESSURE: 120 MMHG | DIASTOLIC BLOOD PRESSURE: 66 MMHG | HEART RATE: 84 BPM | OXYGEN SATURATION: 97 % | HEIGHT: 65 IN | WEIGHT: 229 LBS | RESPIRATION RATE: 18 BRPM | BODY MASS INDEX: 38.15 KG/M2

## 2019-06-18 VITALS
OXYGEN SATURATION: 100 % | DIASTOLIC BLOOD PRESSURE: 70 MMHG | SYSTOLIC BLOOD PRESSURE: 156 MMHG | RESPIRATION RATE: 9 BRPM

## 2019-06-18 DIAGNOSIS — K83.8 BILIARY SLUDGE: Primary | ICD-10-CM

## 2019-06-18 LAB
AMPHETAMINE+METHAMPHETAMINE URINE SCREEN: NEGATIVE
BARBITURATE QUANTITATIVE URINE: NEGATIVE
BENZODIAZEPINE QUANTITATIVE URINE: NEGATIVE
CANNABINOID QUANTITATIVE URINE: POSITIVE
COCAINE METABOLITE QUANTITATIVE URINE: NEGATIVE
GLUCOSE BLD-MCNC: 138 MG/DL (ref 70–108)
OPIATES, URINE: NEGATIVE
OXYCODONE: NEGATIVE
PHENCYCLIDINE QUANTITATIVE URINE: NEGATIVE
POTASSIUM SERPL-SCNC: 3.9 MEQ/L (ref 3.5–5.2)

## 2019-06-18 PROCEDURE — 6360000002 HC RX W HCPCS

## 2019-06-18 PROCEDURE — 36415 COLL VENOUS BLD VENIPUNCTURE: CPT

## 2019-06-18 PROCEDURE — 3600000002 HC SURGERY LEVEL 2 BASE: Performed by: SURGERY

## 2019-06-18 PROCEDURE — 2500000003 HC RX 250 WO HCPCS

## 2019-06-18 PROCEDURE — 2580000003 HC RX 258

## 2019-06-18 PROCEDURE — 3600000012 HC SURGERY LEVEL 2 ADDTL 15MIN: Performed by: SURGERY

## 2019-06-18 PROCEDURE — 7100000011 HC PHASE II RECOVERY - ADDTL 15 MIN: Performed by: SURGERY

## 2019-06-18 PROCEDURE — 2500000003 HC RX 250 WO HCPCS: Performed by: SURGERY

## 2019-06-18 PROCEDURE — 7100000001 HC PACU RECOVERY - ADDTL 15 MIN: Performed by: SURGERY

## 2019-06-18 PROCEDURE — 3700000000 HC ANESTHESIA ATTENDED CARE: Performed by: SURGERY

## 2019-06-18 PROCEDURE — 6370000000 HC RX 637 (ALT 250 FOR IP)

## 2019-06-18 PROCEDURE — 2720000010 HC SURG SUPPLY STERILE: Performed by: SURGERY

## 2019-06-18 PROCEDURE — 47562 LAPAROSCOPIC CHOLECYSTECTOMY: CPT | Performed by: SURGERY

## 2019-06-18 PROCEDURE — 80307 DRUG TEST PRSMV CHEM ANLYZR: CPT

## 2019-06-18 PROCEDURE — 3700000001 HC ADD 15 MINUTES (ANESTHESIA): Performed by: SURGERY

## 2019-06-18 PROCEDURE — 7100000000 HC PACU RECOVERY - FIRST 15 MIN: Performed by: SURGERY

## 2019-06-18 PROCEDURE — 2709999900 HC NON-CHARGEABLE SUPPLY: Performed by: SURGERY

## 2019-06-18 PROCEDURE — 7100000010 HC PHASE II RECOVERY - FIRST 15 MIN: Performed by: SURGERY

## 2019-06-18 PROCEDURE — 88304 TISSUE EXAM BY PATHOLOGIST: CPT

## 2019-06-18 PROCEDURE — 84132 ASSAY OF SERUM POTASSIUM: CPT

## 2019-06-18 PROCEDURE — 82948 REAGENT STRIP/BLOOD GLUCOSE: CPT

## 2019-06-18 RX ORDER — FENTANYL CITRATE 50 UG/ML
50 INJECTION, SOLUTION INTRAMUSCULAR; INTRAVENOUS EVERY 5 MIN PRN
Status: DISCONTINUED | OUTPATIENT
Start: 2019-06-18 | End: 2019-06-18 | Stop reason: HOSPADM

## 2019-06-18 RX ORDER — ONDANSETRON 2 MG/ML
4 INJECTION INTRAMUSCULAR; INTRAVENOUS EVERY 6 HOURS PRN
Status: DISCONTINUED | OUTPATIENT
Start: 2019-06-18 | End: 2019-06-18 | Stop reason: HOSPADM

## 2019-06-18 RX ORDER — HYDROCODONE BITARTRATE AND ACETAMINOPHEN 5; 325 MG/1; MG/1
1-2 TABLET ORAL EVERY 6 HOURS PRN
Qty: 30 TABLET | Refills: 0 | Status: SHIPPED | OUTPATIENT
Start: 2019-06-18 | End: 2019-06-21

## 2019-06-18 RX ORDER — LIDOCAINE HYDROCHLORIDE 20 MG/ML
INJECTION, SOLUTION EPIDURAL; INFILTRATION; INTRACAUDAL; PERINEURAL PRN
Status: DISCONTINUED | OUTPATIENT
Start: 2019-06-18 | End: 2019-06-18 | Stop reason: SDUPTHER

## 2019-06-18 RX ORDER — HYDROCODONE BITARTRATE AND ACETAMINOPHEN 5; 325 MG/1; MG/1
1 TABLET ORAL EVERY 4 HOURS PRN
Status: DISCONTINUED | OUTPATIENT
Start: 2019-06-18 | End: 2019-06-18 | Stop reason: HOSPADM

## 2019-06-18 RX ORDER — HYDROCODONE BITARTRATE AND ACETAMINOPHEN 5; 325 MG/1; MG/1
2 TABLET ORAL EVERY 4 HOURS PRN
Status: DISCONTINUED | OUTPATIENT
Start: 2019-06-18 | End: 2019-06-18 | Stop reason: HOSPADM

## 2019-06-18 RX ORDER — SODIUM CHLORIDE 0.9 % (FLUSH) 0.9 %
10 SYRINGE (ML) INJECTION EVERY 12 HOURS SCHEDULED
Status: DISCONTINUED | OUTPATIENT
Start: 2019-06-18 | End: 2019-06-18 | Stop reason: HOSPADM

## 2019-06-18 RX ORDER — SODIUM CHLORIDE 9 MG/ML
INJECTION, SOLUTION INTRAVENOUS CONTINUOUS
Status: DISCONTINUED | OUTPATIENT
Start: 2019-06-18 | End: 2019-06-18 | Stop reason: HOSPADM

## 2019-06-18 RX ORDER — DIPHENHYDRAMINE HYDROCHLORIDE 50 MG/ML
12.5 INJECTION INTRAMUSCULAR; INTRAVENOUS
Status: DISCONTINUED | OUTPATIENT
Start: 2019-06-18 | End: 2019-06-18 | Stop reason: HOSPADM

## 2019-06-18 RX ORDER — PROPOFOL 10 MG/ML
INJECTION, EMULSION INTRAVENOUS PRN
Status: DISCONTINUED | OUTPATIENT
Start: 2019-06-18 | End: 2019-06-18 | Stop reason: SDUPTHER

## 2019-06-18 RX ORDER — FENTANYL CITRATE 50 UG/ML
INJECTION, SOLUTION INTRAMUSCULAR; INTRAVENOUS PRN
Status: DISCONTINUED | OUTPATIENT
Start: 2019-06-18 | End: 2019-06-18 | Stop reason: SDUPTHER

## 2019-06-18 RX ORDER — SODIUM CHLORIDE 0.9 % (FLUSH) 0.9 %
10 SYRINGE (ML) INJECTION PRN
Status: DISCONTINUED | OUTPATIENT
Start: 2019-06-18 | End: 2019-06-18 | Stop reason: HOSPADM

## 2019-06-18 RX ORDER — ONDANSETRON 2 MG/ML
INJECTION INTRAMUSCULAR; INTRAVENOUS PRN
Status: DISCONTINUED | OUTPATIENT
Start: 2019-06-18 | End: 2019-06-18 | Stop reason: SDUPTHER

## 2019-06-18 RX ORDER — SODIUM CHLORIDE 9 MG/ML
INJECTION, SOLUTION INTRAVENOUS CONTINUOUS PRN
Status: DISCONTINUED | OUTPATIENT
Start: 2019-06-18 | End: 2019-06-18 | Stop reason: SDUPTHER

## 2019-06-18 RX ORDER — HYDRALAZINE HYDROCHLORIDE 20 MG/ML
5 INJECTION INTRAMUSCULAR; INTRAVENOUS EVERY 10 MIN PRN
Status: DISCONTINUED | OUTPATIENT
Start: 2019-06-18 | End: 2019-06-18 | Stop reason: HOSPADM

## 2019-06-18 RX ORDER — MORPHINE SULFATE 2 MG/ML
2 INJECTION, SOLUTION INTRAMUSCULAR; INTRAVENOUS EVERY 5 MIN PRN
Status: DISCONTINUED | OUTPATIENT
Start: 2019-06-18 | End: 2019-06-18 | Stop reason: HOSPADM

## 2019-06-18 RX ORDER — LABETALOL HYDROCHLORIDE 5 MG/ML
5 INJECTION, SOLUTION INTRAVENOUS EVERY 5 MIN PRN
Status: DISCONTINUED | OUTPATIENT
Start: 2019-06-18 | End: 2019-06-18 | Stop reason: HOSPADM

## 2019-06-18 RX ORDER — MORPHINE SULFATE 2 MG/ML
4 INJECTION, SOLUTION INTRAMUSCULAR; INTRAVENOUS
Status: DISCONTINUED | OUTPATIENT
Start: 2019-06-18 | End: 2019-06-18 | Stop reason: HOSPADM

## 2019-06-18 RX ORDER — MIDAZOLAM HYDROCHLORIDE 1 MG/ML
INJECTION INTRAMUSCULAR; INTRAVENOUS PRN
Status: DISCONTINUED | OUTPATIENT
Start: 2019-06-18 | End: 2019-06-18 | Stop reason: SDUPTHER

## 2019-06-18 RX ORDER — METOPROLOL SUCCINATE 25 MG/1
25 TABLET, EXTENDED RELEASE ORAL ONCE
Status: DISCONTINUED | OUTPATIENT
Start: 2019-06-18 | End: 2019-06-18 | Stop reason: HOSPADM

## 2019-06-18 RX ORDER — CIPROFLOXACIN 2 MG/ML
400 INJECTION, SOLUTION INTRAVENOUS
Status: COMPLETED | OUTPATIENT
Start: 2019-06-18 | End: 2019-06-18

## 2019-06-18 RX ORDER — KETOROLAC TROMETHAMINE 30 MG/ML
INJECTION, SOLUTION INTRAMUSCULAR; INTRAVENOUS PRN
Status: DISCONTINUED | OUTPATIENT
Start: 2019-06-18 | End: 2019-06-18 | Stop reason: SDUPTHER

## 2019-06-18 RX ORDER — ONDANSETRON 2 MG/ML
4 INJECTION INTRAMUSCULAR; INTRAVENOUS
Status: DISCONTINUED | OUTPATIENT
Start: 2019-06-18 | End: 2019-06-18 | Stop reason: HOSPADM

## 2019-06-18 RX ORDER — DEXAMETHASONE SODIUM PHOSPHATE 4 MG/ML
INJECTION, SOLUTION INTRA-ARTICULAR; INTRALESIONAL; INTRAMUSCULAR; INTRAVENOUS; SOFT TISSUE PRN
Status: DISCONTINUED | OUTPATIENT
Start: 2019-06-18 | End: 2019-06-18 | Stop reason: SDUPTHER

## 2019-06-18 RX ORDER — BUPIVACAINE HYDROCHLORIDE AND EPINEPHRINE 5; 5 MG/ML; UG/ML
INJECTION, SOLUTION EPIDURAL; INTRACAUDAL; PERINEURAL PRN
Status: DISCONTINUED | OUTPATIENT
Start: 2019-06-18 | End: 2019-06-18 | Stop reason: ALTCHOICE

## 2019-06-18 RX ORDER — MEPERIDINE HYDROCHLORIDE 25 MG/ML
12.5 INJECTION INTRAMUSCULAR; INTRAVENOUS; SUBCUTANEOUS EVERY 5 MIN PRN
Status: DISCONTINUED | OUTPATIENT
Start: 2019-06-18 | End: 2019-06-18 | Stop reason: HOSPADM

## 2019-06-18 RX ORDER — ROCURONIUM BROMIDE 10 MG/ML
INJECTION, SOLUTION INTRAVENOUS PRN
Status: DISCONTINUED | OUTPATIENT
Start: 2019-06-18 | End: 2019-06-18 | Stop reason: SDUPTHER

## 2019-06-18 RX ORDER — HYDROCODONE BITARTRATE AND ACETAMINOPHEN 5; 325 MG/1; MG/1
TABLET ORAL
Status: COMPLETED
Start: 2019-06-18 | End: 2019-06-18

## 2019-06-18 RX ORDER — MORPHINE SULFATE 2 MG/ML
2 INJECTION, SOLUTION INTRAMUSCULAR; INTRAVENOUS
Status: DISCONTINUED | OUTPATIENT
Start: 2019-06-18 | End: 2019-06-18 | Stop reason: HOSPADM

## 2019-06-18 RX ADMIN — KETOROLAC TROMETHAMINE 30 MG: 30 INJECTION, SOLUTION INTRAMUSCULAR; INTRAVENOUS at 14:33

## 2019-06-18 RX ADMIN — MIDAZOLAM HYDROCHLORIDE 2 MG: 1 INJECTION, SOLUTION INTRAMUSCULAR; INTRAVENOUS at 13:53

## 2019-06-18 RX ADMIN — SUGAMMADEX 200 MG: 100 INJECTION, SOLUTION INTRAVENOUS at 14:41

## 2019-06-18 RX ADMIN — SODIUM CHLORIDE: 9 INJECTION, SOLUTION INTRAVENOUS at 15:20

## 2019-06-18 RX ADMIN — HYDROCODONE BITARTRATE AND ACETAMINOPHEN 2 TABLET: 5; 325 TABLET ORAL at 16:02

## 2019-06-18 RX ADMIN — METRONIDAZOLE 500 MG: 500 INJECTION, SOLUTION INTRAVENOUS at 14:12

## 2019-06-18 RX ADMIN — CIPROFLOXACIN 400 MG: 2 INJECTION, SOLUTION INTRAVENOUS at 13:59

## 2019-06-18 RX ADMIN — ONDANSETRON HYDROCHLORIDE 4 MG: 4 INJECTION, SOLUTION INTRAMUSCULAR; INTRAVENOUS at 14:33

## 2019-06-18 RX ADMIN — PROPOFOL 200 MG: 10 INJECTION, EMULSION INTRAVENOUS at 14:00

## 2019-06-18 RX ADMIN — FENTANYL CITRATE 100 MCG: 50 INJECTION INTRAMUSCULAR; INTRAVENOUS at 13:57

## 2019-06-18 RX ADMIN — LIDOCAINE HYDROCHLORIDE 60 MG: 20 INJECTION, SOLUTION EPIDURAL; INFILTRATION; INTRACAUDAL; PERINEURAL at 13:57

## 2019-06-18 RX ADMIN — ROCURONIUM BROMIDE 50 MG: 10 INJECTION INTRAVENOUS at 14:01

## 2019-06-18 RX ADMIN — SODIUM CHLORIDE: 9 INJECTION, SOLUTION INTRAVENOUS at 13:10

## 2019-06-18 RX ADMIN — FENTANYL CITRATE 50 MCG: 50 INJECTION INTRAMUSCULAR; INTRAVENOUS at 14:33

## 2019-06-18 RX ADMIN — DEXAMETHASONE SODIUM PHOSPHATE 4 MG: 4 INJECTION, SOLUTION INTRAMUSCULAR; INTRAVENOUS at 14:33

## 2019-06-18 RX ADMIN — SODIUM CHLORIDE: 9 INJECTION, SOLUTION INTRAVENOUS at 13:52

## 2019-06-18 RX ADMIN — SODIUM CHLORIDE: 9 INJECTION, SOLUTION INTRAVENOUS at 14:33

## 2019-06-18 ASSESSMENT — PULMONARY FUNCTION TESTS
PIF_VALUE: 1
PIF_VALUE: 36
PIF_VALUE: 27
PIF_VALUE: 26
PIF_VALUE: 34
PIF_VALUE: 29
PIF_VALUE: 1
PIF_VALUE: 37
PIF_VALUE: 27
PIF_VALUE: 2
PIF_VALUE: 29
PIF_VALUE: 34
PIF_VALUE: 34
PIF_VALUE: 30
PIF_VALUE: 31
PIF_VALUE: 25
PIF_VALUE: 33
PIF_VALUE: 36
PIF_VALUE: 30
PIF_VALUE: 27
PIF_VALUE: 31
PIF_VALUE: 21
PIF_VALUE: 33
PIF_VALUE: 28
PIF_VALUE: 27
PIF_VALUE: 2
PIF_VALUE: 31
PIF_VALUE: 27
PIF_VALUE: 30
PIF_VALUE: 29
PIF_VALUE: 1
PIF_VALUE: 30
PIF_VALUE: 29
PIF_VALUE: 27
PIF_VALUE: 30
PIF_VALUE: 29
PIF_VALUE: 29
PIF_VALUE: 33
PIF_VALUE: 31
PIF_VALUE: 30
PIF_VALUE: 36
PIF_VALUE: 30
PIF_VALUE: 27
PIF_VALUE: 29
PIF_VALUE: 27
PIF_VALUE: 1
PIF_VALUE: 30
PIF_VALUE: 27
PIF_VALUE: 30
PIF_VALUE: 27
PIF_VALUE: 27
PIF_VALUE: 28

## 2019-06-18 ASSESSMENT — PAIN DESCRIPTION - LOCATION
LOCATION: ABDOMEN
LOCATION: ABDOMEN

## 2019-06-18 ASSESSMENT — PAIN - FUNCTIONAL ASSESSMENT: PAIN_FUNCTIONAL_ASSESSMENT: 0-10

## 2019-06-18 ASSESSMENT — PAIN DESCRIPTION - DESCRIPTORS
DESCRIPTORS: SORE;ACHING
DESCRIPTORS: ACHING;SORE

## 2019-06-18 ASSESSMENT — PAIN DESCRIPTION - PAIN TYPE
TYPE: SURGICAL PAIN
TYPE: SURGICAL PAIN

## 2019-06-18 ASSESSMENT — PAIN SCALES - GENERAL
PAINLEVEL_OUTOF10: 7
PAINLEVEL_OUTOF10: 8
PAINLEVEL_OUTOF10: 0
PAINLEVEL_OUTOF10: 7
PAINLEVEL_OUTOF10: 0
PAINLEVEL_OUTOF10: 0
PAINLEVEL_OUTOF10: 8

## 2019-06-18 NOTE — PROGRESS NOTES
1446 ARRIVED IN PACU FROM O R AFTER GENERAL ANESTHESIA. SEE FLOW SHEET. 1510 DOZES . DENIES PAIN. [address] RESTS COMFORTABLY. DENIES PAIN. REPORT TO RUSLAN [address] NOTIFIED.  SCRIPTS ON CHART

## 2019-06-18 NOTE — BRIEF OP NOTE
Brief Postoperative Note  ______________________________________________________________    Patient: Desean Márquez  YOB: 1965  MRN: [id number]  Date of Procedure: 6/18/2019    Pre-Op Diagnosis: 1.  BILIARY SLUDGE  2. Obesity (BMI38)    Post-Op Diagnosis: Same       Procedure(s):  LAPARASCOPIC CHOLECYSTECTOMY    Anesthesia: General/local    Surgeon(s):  Dayami Hatch MD    Assistant: Yuni Adam    Estimated Blood Loss (mL): 10 ml    Complications: None    Specimens:   ID Type Source Tests Collected by Time Destination   A : GALLBLADDER Tissue Gallbladder SURGICAL PATHOLOGY Dayami Hatch MD 6/18/2019 1425        Implants:  * No implants in log *      Drains:   [REMOVED] Closed/Suction Drain Left Back;Hip Bulb 10 Mauritian (Removed)       [REMOVED] Closed/Suction Drain Left;Midline Back Accordion 7 Mauritian (Removed)       Findings: as above    Dayami Hatch MD  Date: 6/18/2019  Time: 2:35 PM

## 2019-06-18 NOTE — ANESTHESIA PRE PROCEDURE
Department of Anesthesiology  Preprocedure Note       Name:  Eleonora Carson   Age:  49 y.o.  :  1965                                          MRN:  [id number]         Date:  2019      Surgeon: Joy Yarbrough):  Salena Rios MD    Procedure: LAP BEATRIZ POSS OPEN (N/A Abdomen)    Medications prior to admission:   Prior to Admission medications    Medication Sig Start Date End Date Taking?  Authorizing Provider   ondansetron (ZOFRAN ODT) 4 MG disintegrating tablet Take 1 tablet by mouth every 8 hours as needed for Nausea or Vomiting 19  Yes Clementina Merchant PA-C   buPROPion (WELLBUTRIN) 75 MG tablet Take 75 mg by mouth daily   Yes Historical Provider, MD   spironolactone-hydrochlorothiazide (ALDACTAZIDE) 25-25 MG per tablet take 1 tablet by mouth once daily 18  Yes Moises Saeed MD   metoprolol succinate (TOPROL XL) 25 MG extended release tablet Take 1 tablet by mouth daily 18  Yes Annette Potter MD   linaclotide (LINZESS) 145 MCG capsule Take 145 mcg by mouth every morning (before breakfast)   Yes Historical Provider, MD   omeprazole (PRILOSEC) 20 MG capsule Take 20 mg by mouth daily    Yes Historical Provider, MD   [facility] daily    Historical Provider, MD   ketorolac (TORADOL) 10 MG tablet Take 1 tablet by mouth every 6 hours as needed for Pain 3/26/19   Alexi Baer MD   CPAP Machine MISC by Does not apply route Please change CPAP pressure to 12 cm H20. 10/9/17   Nimo Christianson PA-C   ibuprofen (ADVIL;MOTRIN) 600 MG tablet Take 1 tablet by mouth every 6 hours as needed for Pain 16   Inés Alcantara DO       Current medications:    Current Facility-Administered Medications   Medication Dose Route Frequency Provider Last Rate Last Dose    0.9 % sodium chloride infusion   Intravenous Continuous Hannah Golden  mL/hr at 15/70/79 1310      ciprofloxacin (CIPRO) IVPB 400 mg  [facility]        SPINAL CORD STIMULATOR IMPLANTATION N/A 3/21/2019    THORACIC I&D WITH SPINAL STIMULATOR AND BATTERY REMOVAL performed by Ginny Donahue MD at [address]  2/28/13    c5, c6, c7    SPINAL FUSION  10/03/2017       Social History:    Social History     Tobacco Use    Smoking status: Never Smoker    Smokeless tobacco: Never Used   Substance Use Topics    Alcohol use: Yes     Alcohol/week: 0.0 oz     Comment: very rare                                Counseling given: Not Answered      Vital Signs (Current):   Vitals:    06/18/19 1238 06/18/19 1246   BP: 138/85    Pulse: 87    Resp: 16    Temp: 98.4 °F (36.9 °C)    TempSrc: Temporal    SpO2: 96%    Weight:  229 lb (103.9 kg)   Height:  5' 5\" (1.651 m)                                              BP Readings from Last 3 Encounters:   06/18/19 138/85   05/15/19 128/82   04/25/19 (!) 157/92       NPO Status: Time of last liquid consumption: 0500                        Time of last solid consumption: 1900                        Date of last liquid consumption: 06/18/19                        Date of last solid food consumption: 06/17/19    BMI:   Wt Readings from Last 3 Encounters:   06/18/19 229 lb (103.9 kg)   05/15/19 232 lb (105.2 kg)   04/25/19 225 lb (102.1 kg)     Body mass index is 38.11 kg/m².     CBC:   Lab Results   Component Value Date    WBC 6.3 06/13/2019    RBC 4.34 06/13/2019    HGB 11.4 06/13/2019    HCT 36.7 06/13/2019    MCV 84.6 06/13/2019    RDW 12.4 04/16/2018     06/13/2019       CMP:   Lab Results   Component Value Date     04/25/2019    K 3.6 04/25/2019    CL 98 04/25/2019    CO2 27 04/25/2019    BUN 8 04/25/2019    CREATININE 0.9 04/25/2019    LABGLOM 66 04/25/2019    GLUCOSE 130 04/25/2019    PROT 8.1 04/25/2019    CALCIUM 9.7 04/25/2019    BILITOT 0.3 04/25/2019    ALKPHOS 86 04/25/2019    AST 67 04/25/2019    ALT 88 04/25/2019       POC Tests: No results for input(s): POCGLU, POCNA, POCK, POCCL, POCBUN, POCHEMO,

## 2019-06-18 NOTE — INTERVAL H&P NOTE
H&P Update    Patient's History and Physical from May was reviewed. Patient examined. There has been no change.     Dayami Hatch

## 2019-06-19 ENCOUNTER — TELEPHONE (OUTPATIENT)
Dept: SURGERY | Age: 54
End: 2019-06-19

## 2019-06-19 NOTE — OP NOTE
[address] [place] [address]                                OPERATIVE REPORT    PATIENT NAME: Jaswinder Stratton                  :        1965  MED REC NO:   [id number]                           ROOM:  ACCOUNT NO:   [de-identified]                           ADMIT DATE: 2019  PROVIDER:     Benedict Vega M.D.    Kanchan Levin OF PROCEDURE:  2019    PREOPERATIVE DIAGNOSES:  1. Biliary sludge. 2.  Obesity (BMI 38). 3.  Fatty liver disease. POSTOPERATIVE DIAGNOSES:  1. Biliary sludge. 2.  Obesity (BMI 38). 3.  Fatty liver disease. PROCEDURE:  Laparoscopic cholecystectomy. SURGEON:  Benedict Vega MD    ASSISTANT:  Eleanor Smiley. Stevenson, RNFA    ANESTHESIA:  General/local.    ESTIMATED BLOOD LOSS:  10 mL. DRAINS:  None. COMPLICATIONS:  None. DISPOSITION:  Stable to the recovery room. INDICATIONS:  The patient is a 21-year-old female who I had seen in the  office secondary to gallbladder disease. She has then had symptomatic  biliary sludge. Both operative and nonoperative intervention plans were  discussed. She understood and wished to proceed with surgical  intervention. Risks of surgery were then further discussed. Some of  the risks included, but were not limited to bleeding, infection, the  need for reoperation, severe chronic postoperative pain or numbness,  major vascular or nerve injury, cardiopulmonary complications,  anesthetic complications, seroma/hematoma formation, wound breakdown,  trocar site herniation, bowel leak, bile duct injury, biloma formation,  chronic pain, and death. After all of the questions were answered in  their entirety and the patient was completely aware of the current  situation, she elected to proceed with the procedure.     DESCRIPTION OF THE PROCEDURE:  After informed consent was signed and  placed on the chart, the patient was taken back to the operating room  and placed supine on the operating room table. General anesthesia was  induced. She tolerated this well throughout the case. All pressure  points were padded. She was on preoperative antibiotics. Bilateral  lower extremity sequential compression devices were placed prior to  incision. Her abdomen and pelvis were prepped and draped in the usual  sterile standard fashion. A time-out occurred prior to the operation  which not only identified the patient, but also the planned procedure to  be performed. At the end of the time-out, there were no questions or  concerns. I began the operation by making a small transverse incision just above  the umbilicus. The fascia was elevated and the Veress needle inserted. Intra-abdominal cavity was insufflated to a pressure of approximately 15  mmHg with carbon dioxide gas. The patient tolerated the insufflation  well. An 11-mm trocar was placed. Laparoscope inserted. Upon initial  evaluation, there was no hollow viscus, solid organ, or major vascular  injury with the Veress needle insertion or the first trocar placement. An 11-mm trocar was then placed at the subxiphoid position. Two  separate 5-mm trocars were placed in the standard location under direct  vision. She was then placed in reverse Trendelenburg with the right  side elevated. Gallbladder was elevated. It was very distended and was  in need of drainage before I could adequately grasp the fundus and  elevate this up over the dome of the liver. It appeared to be  chronically inflamed especially around the infundibulum, which was then  grasped and retracted down to the right lower quadrant to better expose  Calot's triangle. There did not appear to be any kind of acute disease. Inflammatory tissues around the infundibulum were then incised and  eventually the cystic artery and duct were each identified. They were  each then circumferentially dissected free until the critical view was  obtained.   At that time, they were each then doubly clipped and divided  close to the gallbladder. The gallbladder was then dissected off of the  liver bed using electrocautery. It was then placed into an endoscopic  retrieval bag and brought out through the subxiphoid trocar site under  direct vision. It was then sent to Pathology for permanent. Irrigation. Irrigant return was clear. Cystic artery and duct were  each identified and the clips appeared to be intact. There was no  oozing of bile or blood from the cystic duct and artery stumps nor from  the liver bed. Using a StorGramVaani suture passer, I placed an 0 Vicryl suture  through and through the fascia there at the large trocar site. These  were tied, and at the completion of that, there were no fascial defects. Subcutaneous tissues were irrigated. Hemostasis was adequate. Skin was  reapproximated at the incisional sites with 4-0 Vicryl in a subcuticular  fashion. Closed incisions were then cleaned, dried, and Steri-Strips  applied. A 0.5% Marcaine with epinephrine was used for local  anesthetic. The patient tolerated the injection of local anesthetic  without difficulty. Sponge, needle, and instrumentation counts were  correct at the end of the procedure. The patient tolerated the  procedure well with no apparent complications and only about 5 to 10 mL  of blood loss. She was able to be brought out of general anesthesia and  transferred to the postanesthesia care unit in stable condition.         Chandrika Segovia M.D.    D: 06/18/2019 14:41:43       T: 06/18/2019 18:21:50     [email or web address]  Job#: [id number]     Doc#: [id number]    CC:

## 2019-07-01 ENCOUNTER — OFFICE VISIT (OUTPATIENT)
Dept: SURGERY | Age: 54
End: 2019-07-01

## 2019-07-01 VITALS
HEIGHT: 65 IN | SYSTOLIC BLOOD PRESSURE: 138 MMHG | HEART RATE: 88 BPM | BODY MASS INDEX: 38.15 KG/M2 | DIASTOLIC BLOOD PRESSURE: 84 MMHG | OXYGEN SATURATION: 99 % | WEIGHT: 229 LBS | RESPIRATION RATE: 18 BRPM | TEMPERATURE: 97.8 F

## 2019-07-01 DIAGNOSIS — Z90.49 S/P LAPAROSCOPIC CHOLECYSTECTOMY: Primary | ICD-10-CM

## 2019-07-01 PROCEDURE — 99024 POSTOP FOLLOW-UP VISIT: CPT | Performed by: NURSE PRACTITIONER

## 2019-07-01 ASSESSMENT — ENCOUNTER SYMPTOMS
VOMITING: 0
ABDOMINAL DISTENTION: 0
NAUSEA: 0
VOICE CHANGE: 0
EYE REDNESS: 0
COUGH: 0
BLOOD IN STOOL: 0
CONSTIPATION: 0
DIARRHEA: 0
SINUS PRESSURE: 0
BACK PAIN: 0
CHOKING: 0
TROUBLE SWALLOWING: 0
PHOTOPHOBIA: 0
RECTAL PAIN: 0
ANAL BLEEDING: 0
STRIDOR: 0
ABDOMINAL PAIN: 0
SHORTNESS OF BREATH: 0
CHEST TIGHTNESS: 0
FACIAL SWELLING: 0
RHINORRHEA: 0
EYE DISCHARGE: 0
WHEEZING: 0
APNEA: 0
COLOR CHANGE: 0
EYE PAIN: 0
SORE THROAT: 0
EYE ITCHING: 0

## 2019-07-01 NOTE — PROGRESS NOTES
[place] in [facility] [address] N/A 2/22/2019    T7-8 SPINAL CORD STIMULATOR IMPLANT performed by Zayra Montalvo MD at [facility] N/A 3/21/2019    THORACIC I&D WITH SPINAL STIMULATOR AND BATTERY REMOVAL performed by Zayra Montalvo MD at [facility]  [address]  2/28/13    c5, c6, c7    SPINAL FUSION  10/03/2017       Family History   Problem Relation Age of Onset    Arthritis Mother     Cancer Mother     High Blood Pressure Mother     Stroke Mother     Heart Attack Maternal Grandmother     Heart Attack Maternal Grandfather     Early Death Father 25        [de-identified]    Diabetes Neg Hx     Heart Disease Neg Hx        Social History     Tobacco Use    Smoking status: Never Smoker    Smokeless tobacco: Never Used   Substance Use Topics    Alcohol use:  Yes     Alcohol/week: 0.0 oz     Comment: very rare      Current Outpatient Medications   Medication Sig Dispense Refill    NONFORMULARY Medical marijuna daily      ondansetron (ZOFRAN ODT) 4 MG disintegrating tablet Take 1 tablet by mouth every 8 hours as needed for Nausea or Vomiting 20 tablet 0    ketorolac (TORADOL) 10 MG tablet Take 1 tablet by mouth every 6 hours as needed for Pain 16 tablet 0    buPROPion (WELLBUTRIN) 75 MG tablet Take 75 mg by mouth daily      spironolactone-hydrochlorothiazide (ALDACTAZIDE) 25-25 MG per tablet take 1 tablet by mouth once daily 90 tablet 1    metoprolol succinate (TOPROL XL) 25 MG extended release tablet Take 1 tablet by mouth daily 90 tablet 1    CPAP Machine MISC by Does not apply route Please change CPAP pressure to 12 cm H20. 1 each 0    ibuprofen (ADVIL;MOTRIN) 600 MG tablet Take 1 tablet by mouth every 6 hours as needed for Pain 60 tablet 1    linaclotide (LINZESS) 145 MCG capsule Take 145 mcg by mouth every morning (before breakfast)      omeprazole (PRILOSEC) 20 MG capsule Take 20 mg by mouth daily        No current erythema. Psychiatric: She has a normal mood and affect. Her speech is normal and behavior is normal. Judgment and thought content normal. Cognition and memory are normal.   Vitals reviewed. Lab Results   Component Value Date    WBC 6.3 06/13/2019    HGB 11.4 (L) 06/13/2019    HCT 36.7 (L) 06/13/2019    MCV 84.6 06/13/2019     06/13/2019     Lab Results   Component Value Date     04/25/2019    K 3.9 06/18/2019    CL 98 04/25/2019    CO2 27 04/25/2019    BUN 8 04/25/2019    CREATININE 0.9 04/25/2019    GLUCOSE 130 04/25/2019    CALCIUM 9.7 04/25/2019        PATHOLOGY REPORT    Clinical Information: BILIARY SLUDGE    FINAL DIAGNOSIS:  Gallbladder, cholecystectomy:   Cholesterolosis. Specimen:  GALLBLADDER      Gross Examination:  The container is labeled Michael Zelaya, gallbladder.  Received in  formalin is an intact gallbladder with stapled cystic duct measuring  6.5 x 1.9 x 1.0 cm.  The serosal surface is unremarkable.  The specimen  is opened longitudinally to reveal a mucosal surface stippled with  possible cholesterolosis.  No masses, lesions or stones are identified.   Representative sections including the cystic duct margin are submitted  in one cassette.  1 ss.  SMW:[id number]    Microscopic Examination:  Microscopic examination was performed. Patient Active Problem List   Diagnosis    GERD (gastroesophageal reflux disease)    Dysphonia    Dysphagia    Left thyroid nodule    Pyelonephritis    Leukocytosis    Hypertension    Weakness    Depression    History of fusion of cervical spine    Status post partial thyroidectomy    Presbyesophagus    Obesity (BMI 30-39. 9)    Obstructive sleep apnea on CPAP    EKG abnormalities    Tachycardia    Chronic pain syndrome    Infection of spinal cord stimulator (HCC)     Assessment:      1. Status post laparoscopic cholecystectomy  2. Obesity     Plan:     1. Abdomen benign. Incisions are healing well without signs of infection.

## 2019-07-31 ENCOUNTER — HOSPITAL ENCOUNTER (OUTPATIENT)
Age: 54
Discharge: HOME | End: 2019-07-31
Payer: COMMERCIAL

## 2019-07-31 LAB
ANION GAP SERPL CALCULATED.3IONS-SCNC: 15 MEQ/L (ref 8–16)
APTT: 29.4 SECONDS (ref 22–38)
BUN BLDV-MCNC: 9 MG/DL (ref 7–22)
CALCIUM SERPL-MCNC: 9.4 MG/DL (ref 8.5–10.5)
CHLORIDE BLD-SCNC: 100 MEQ/L (ref 98–111)
CO2: 25 MEQ/L (ref 23–33)
CREAT SERPL-MCNC: 0.9 MG/DL (ref 0.4–1.2)
GFR SERPL CREATININE-BSD FRML MDRD: 65 ML/MIN/1.73M2
GLUCOSE BLD-MCNC: 263 MG/DL (ref 70–108)
INR BLD: 1.06 (ref 0.85–1.13)
MRSA NASAL SCREEN RT-PCR: NEGATIVE
POTASSIUM SERPL-SCNC: 4.2 MEQ/L (ref 3.5–5.2)
SODIUM BLD-SCNC: 140 MEQ/L (ref 135–145)
STAPH AUREUS SCREEN RT-PCR: POSITIVE

## 2019-07-31 PROCEDURE — 87641 MR-STAPH DNA AMP PROBE: CPT

## 2019-07-31 PROCEDURE — 85610 PROTHROMBIN TIME: CPT

## 2019-07-31 PROCEDURE — 87147 CULTURE TYPE IMMUNOLOGIC: CPT

## 2019-07-31 PROCEDURE — 36415 COLL VENOUS BLD VENIPUNCTURE: CPT

## 2019-07-31 PROCEDURE — 87081 CULTURE SCREEN ONLY: CPT

## 2019-07-31 PROCEDURE — 2709999900 HC NON-CHARGEABLE SUPPLY

## 2019-07-31 PROCEDURE — 85730 THROMBOPLASTIN TIME PARTIAL: CPT

## 2019-07-31 PROCEDURE — 87640 STAPH A DNA AMP PROBE: CPT

## 2019-07-31 PROCEDURE — 80048 BASIC METABOLIC PNL TOTAL CA: CPT

## 2019-08-04 LAB — MRSA SCREEN: NORMAL

## 2019-08-09 ENCOUNTER — APPOINTMENT (OUTPATIENT)
Dept: GENERAL RADIOLOGY | Age: 54
End: 2019-08-09
Attending: ORTHOPAEDIC SURGERY
Payer: COMMERCIAL

## 2019-08-09 ENCOUNTER — ANESTHESIA (OUTPATIENT)
Dept: OPERATING ROOM | Age: 54
End: 2019-08-09
Payer: COMMERCIAL

## 2019-08-09 ENCOUNTER — HOSPITAL ENCOUNTER (OUTPATIENT)
Age: 54
Discharge: HOME | End: 2019-08-10
Attending: ORTHOPAEDIC SURGERY | Admitting: ORTHOPAEDIC SURGERY
Payer: COMMERCIAL

## 2019-08-09 ENCOUNTER — ANESTHESIA EVENT (OUTPATIENT)
Dept: OPERATING ROOM | Age: 54
End: 2019-08-09
Payer: COMMERCIAL

## 2019-08-09 VITALS
SYSTOLIC BLOOD PRESSURE: 95 MMHG | DIASTOLIC BLOOD PRESSURE: 44 MMHG | RESPIRATION RATE: 7 BRPM | OXYGEN SATURATION: 100 % | TEMPERATURE: 97.3 F

## 2019-08-09 LAB — GLUCOSE BLD-MCNC: 166 MG/DL (ref 70–108)

## 2019-08-09 PROCEDURE — 2500000003 HC RX 250 WO HCPCS: Performed by: ORTHOPAEDIC SURGERY

## 2019-08-09 PROCEDURE — 3700000000 HC ANESTHESIA ATTENDED CARE: Performed by: ORTHOPAEDIC SURGERY

## 2019-08-09 PROCEDURE — 3600000005 HC SURGERY LEVEL 5 BASE: Performed by: ORTHOPAEDIC SURGERY

## 2019-08-09 PROCEDURE — 3209999900 FLUORO FOR SURGICAL PROCEDURES

## 2019-08-09 PROCEDURE — 2580000003 HC RX 258: Performed by: PHYSICIAN ASSISTANT

## 2019-08-09 PROCEDURE — 2780000010 HC IMPLANT OTHER: Performed by: ORTHOPAEDIC SURGERY

## 2019-08-09 PROCEDURE — 6370000000 HC RX 637 (ALT 250 FOR IP): Performed by: PHYSICIAN ASSISTANT

## 2019-08-09 PROCEDURE — C9250 ARTISS FIBRIN SEALANT: HCPCS | Performed by: ORTHOPAEDIC SURGERY

## 2019-08-09 PROCEDURE — C1713 ANCHOR/SCREW BN/BN,TIS/BN: HCPCS | Performed by: ORTHOPAEDIC SURGERY

## 2019-08-09 PROCEDURE — C1820 GENERATOR NEURO RECHG BAT SY: HCPCS | Performed by: ORTHOPAEDIC SURGERY

## 2019-08-09 PROCEDURE — C1778 LEAD, NEUROSTIMULATOR: HCPCS | Performed by: ORTHOPAEDIC SURGERY

## 2019-08-09 PROCEDURE — 7100000001 HC PACU RECOVERY - ADDTL 15 MIN: Performed by: ORTHOPAEDIC SURGERY

## 2019-08-09 PROCEDURE — 6370000000 HC RX 637 (ALT 250 FOR IP)

## 2019-08-09 PROCEDURE — 82948 REAGENT STRIP/BLOOD GLUCOSE: CPT

## 2019-08-09 PROCEDURE — 7100000000 HC PACU RECOVERY - FIRST 15 MIN: Performed by: ORTHOPAEDIC SURGERY

## 2019-08-09 PROCEDURE — 6360000002 HC RX W HCPCS: Performed by: PHYSICIAN ASSISTANT

## 2019-08-09 PROCEDURE — 2580000003 HC RX 258: Performed by: ORTHOPAEDIC SURGERY

## 2019-08-09 PROCEDURE — 6360000002 HC RX W HCPCS: Performed by: NURSE ANESTHETIST, CERTIFIED REGISTERED

## 2019-08-09 PROCEDURE — 72020 X-RAY EXAM OF SPINE 1 VIEW: CPT

## 2019-08-09 PROCEDURE — C1787 PATIENT PROGR, NEUROSTIM: HCPCS | Performed by: ORTHOPAEDIC SURGERY

## 2019-08-09 PROCEDURE — 3700000001 HC ADD 15 MINUTES (ANESTHESIA): Performed by: ORTHOPAEDIC SURGERY

## 2019-08-09 PROCEDURE — 2720000010 HC SURG SUPPLY STERILE: Performed by: ORTHOPAEDIC SURGERY

## 2019-08-09 PROCEDURE — 2709999900 HC NON-CHARGEABLE SUPPLY: Performed by: ORTHOPAEDIC SURGERY

## 2019-08-09 PROCEDURE — 3600000015 HC SURGERY LEVEL 5 ADDTL 15MIN: Performed by: ORTHOPAEDIC SURGERY

## 2019-08-09 PROCEDURE — 2500000003 HC RX 250 WO HCPCS: Performed by: NURSE ANESTHETIST, CERTIFIED REGISTERED

## 2019-08-09 DEVICE — ANCHOR
Type: IMPLANTABLE DEVICE | Status: FUNCTIONAL
Brand: CLIK™ X

## 2019-08-09 DEVICE — IMPLANTABLE PULSE GENERATOR KIT
Type: IMPLANTABLE DEVICE | Status: FUNCTIONAL
Brand: SPECTRA WAVEWRITER™

## 2019-08-09 DEVICE — Z DUP USE 2679261 LEAD NEUROSTIMULATOR SPNL CRD PADDLE STIM SYS SURG EQUIP: Type: IMPLANTABLE DEVICE | Status: FUNCTIONAL

## 2019-08-09 DEVICE — Z DUP USE 2138772 BAND ANCHR FIX TISS ANULEX 201: Type: IMPLANTABLE DEVICE | Status: FUNCTIONAL

## 2019-08-09 RX ORDER — SPIRONOLACTONE AND HYDROCHLOROTHIAZIDE 25; 25 MG/1; MG/1
1 TABLET ORAL DAILY
Status: DISCONTINUED | OUTPATIENT
Start: 2019-08-09 | End: 2019-08-09 | Stop reason: CLARIF

## 2019-08-09 RX ORDER — SPIRONOLACTONE 25 MG/1
25 TABLET ORAL DAILY
Status: DISCONTINUED | OUTPATIENT
Start: 2019-08-09 | End: 2019-08-09 | Stop reason: ALTCHOICE

## 2019-08-09 RX ORDER — BUPROPION HYDROCHLORIDE 75 MG/1
75 TABLET ORAL DAILY
Status: DISCONTINUED | OUTPATIENT
Start: 2019-08-09 | End: 2019-08-09

## 2019-08-09 RX ORDER — ONDANSETRON 2 MG/ML
INJECTION INTRAMUSCULAR; INTRAVENOUS PRN
Status: DISCONTINUED | OUTPATIENT
Start: 2019-08-09 | End: 2019-08-09 | Stop reason: SDUPTHER

## 2019-08-09 RX ORDER — DEXAMETHASONE SODIUM PHOSPHATE 4 MG/ML
INJECTION, SOLUTION INTRA-ARTICULAR; INTRALESIONAL; INTRAMUSCULAR; INTRAVENOUS; SOFT TISSUE PRN
Status: DISCONTINUED | OUTPATIENT
Start: 2019-08-09 | End: 2019-08-09 | Stop reason: SDUPTHER

## 2019-08-09 RX ORDER — BUPROPION HYDROCHLORIDE 75 MG/1
150 TABLET ORAL 2 TIMES DAILY
Status: DISCONTINUED | OUTPATIENT
Start: 2019-08-09 | End: 2019-08-09

## 2019-08-09 RX ORDER — SPIRONOLACTONE AND HYDROCHLOROTHIAZIDE 25; 25 MG/1; MG/1
1 TABLET ORAL DAILY
Status: DISCONTINUED | OUTPATIENT
Start: 2019-08-09 | End: 2019-08-10 | Stop reason: HOSPADM

## 2019-08-09 RX ORDER — SCOLOPAMINE TRANSDERMAL SYSTEM 1 MG/1
PATCH, EXTENDED RELEASE TRANSDERMAL
Status: DISCONTINUED
Start: 2019-08-09 | End: 2019-08-10 | Stop reason: HOSPADM

## 2019-08-09 RX ORDER — SODIUM CHLORIDE 0.9 % (FLUSH) 0.9 %
10 SYRINGE (ML) INJECTION PRN
Status: DISCONTINUED | OUTPATIENT
Start: 2019-08-09 | End: 2019-08-10 | Stop reason: HOSPADM

## 2019-08-09 RX ORDER — BISACODYL 10 MG
10 SUPPOSITORY, RECTAL RECTAL DAILY PRN
Status: DISCONTINUED | OUTPATIENT
Start: 2019-08-09 | End: 2019-08-10 | Stop reason: HOSPADM

## 2019-08-09 RX ORDER — OXYCODONE HYDROCHLORIDE AND ACETAMINOPHEN 5; 325 MG/1; MG/1
1 TABLET ORAL EVERY 6 HOURS PRN
Status: DISCONTINUED | OUTPATIENT
Start: 2019-08-09 | End: 2019-08-10 | Stop reason: HOSPADM

## 2019-08-09 RX ORDER — OMEPRAZOLE 40 MG/1
40 CAPSULE, DELAYED RELEASE ORAL DAILY
Status: DISCONTINUED | OUTPATIENT
Start: 2019-08-09 | End: 2019-08-10 | Stop reason: HOSPADM

## 2019-08-09 RX ORDER — ROCURONIUM BROMIDE 10 MG/ML
INJECTION, SOLUTION INTRAVENOUS PRN
Status: DISCONTINUED | OUTPATIENT
Start: 2019-08-09 | End: 2019-08-09 | Stop reason: SDUPTHER

## 2019-08-09 RX ORDER — METOPROLOL SUCCINATE 25 MG/1
25 TABLET, EXTENDED RELEASE ORAL DAILY
Status: DISCONTINUED | OUTPATIENT
Start: 2019-08-10 | End: 2019-08-10 | Stop reason: HOSPADM

## 2019-08-09 RX ORDER — OMEPRAZOLE 20 MG/1
20 CAPSULE, DELAYED RELEASE ORAL DAILY
Status: DISCONTINUED | OUTPATIENT
Start: 2019-08-09 | End: 2019-08-09 | Stop reason: CLARIF

## 2019-08-09 RX ORDER — MORPHINE SULFATE 2 MG/ML
2 INJECTION, SOLUTION INTRAMUSCULAR; INTRAVENOUS
Status: DISCONTINUED | OUTPATIENT
Start: 2019-08-09 | End: 2019-08-10 | Stop reason: HOSPADM

## 2019-08-09 RX ORDER — PROPOFOL 10 MG/ML
INJECTION, EMULSION INTRAVENOUS CONTINUOUS PRN
Status: DISCONTINUED | OUTPATIENT
Start: 2019-08-09 | End: 2019-08-09 | Stop reason: SDUPTHER

## 2019-08-09 RX ORDER — SODIUM CHLORIDE 0.9 % (FLUSH) 0.9 %
10 SYRINGE (ML) INJECTION EVERY 12 HOURS SCHEDULED
Status: DISCONTINUED | OUTPATIENT
Start: 2019-08-09 | End: 2019-08-09 | Stop reason: HOSPADM

## 2019-08-09 RX ORDER — OXYCODONE HYDROCHLORIDE AND ACETAMINOPHEN 5; 325 MG/1; MG/1
2 TABLET ORAL EVERY 6 HOURS PRN
Status: DISCONTINUED | OUTPATIENT
Start: 2019-08-09 | End: 2019-08-10 | Stop reason: HOSPADM

## 2019-08-09 RX ORDER — MIDAZOLAM HYDROCHLORIDE 1 MG/ML
INJECTION INTRAMUSCULAR; INTRAVENOUS PRN
Status: DISCONTINUED | OUTPATIENT
Start: 2019-08-09 | End: 2019-08-09 | Stop reason: SDUPTHER

## 2019-08-09 RX ORDER — GLYCOPYRROLATE 1 MG/5 ML
SYRINGE (ML) INTRAVENOUS PRN
Status: DISCONTINUED | OUTPATIENT
Start: 2019-08-09 | End: 2019-08-09 | Stop reason: SDUPTHER

## 2019-08-09 RX ORDER — PROPOFOL 10 MG/ML
INJECTION, EMULSION INTRAVENOUS PRN
Status: DISCONTINUED | OUTPATIENT
Start: 2019-08-09 | End: 2019-08-09 | Stop reason: SDUPTHER

## 2019-08-09 RX ORDER — DIPHENHYDRAMINE HCL 25 MG
25 TABLET ORAL EVERY 6 HOURS PRN
Status: DISCONTINUED | OUTPATIENT
Start: 2019-08-09 | End: 2019-08-10 | Stop reason: HOSPADM

## 2019-08-09 RX ORDER — CYCLOBENZAPRINE HCL 10 MG
10 TABLET ORAL 3 TIMES DAILY PRN
Status: DISCONTINUED | OUTPATIENT
Start: 2019-08-09 | End: 2019-08-10 | Stop reason: HOSPADM

## 2019-08-09 RX ORDER — PANTOPRAZOLE SODIUM 40 MG/1
40 TABLET, DELAYED RELEASE ORAL
Status: DISCONTINUED | OUTPATIENT
Start: 2019-08-10 | End: 2019-08-09

## 2019-08-09 RX ORDER — VANCOMYCIN HYDROCHLORIDE 1 G/20ML
INJECTION, POWDER, LYOPHILIZED, FOR SOLUTION INTRAVENOUS PRN
Status: DISCONTINUED | OUTPATIENT
Start: 2019-08-09 | End: 2019-08-09 | Stop reason: SDUPTHER

## 2019-08-09 RX ORDER — MORPHINE SULFATE 4 MG/ML
4 INJECTION, SOLUTION INTRAMUSCULAR; INTRAVENOUS
Status: DISCONTINUED | OUTPATIENT
Start: 2019-08-09 | End: 2019-08-10 | Stop reason: HOSPADM

## 2019-08-09 RX ORDER — SODIUM CHLORIDE 9 MG/ML
INJECTION, SOLUTION INTRAVENOUS CONTINUOUS
Status: DISCONTINUED | OUTPATIENT
Start: 2019-08-09 | End: 2019-08-09

## 2019-08-09 RX ORDER — ONDANSETRON 2 MG/ML
4 INJECTION INTRAMUSCULAR; INTRAVENOUS EVERY 6 HOURS PRN
Status: DISCONTINUED | OUTPATIENT
Start: 2019-08-09 | End: 2019-08-10 | Stop reason: HOSPADM

## 2019-08-09 RX ORDER — SCOLOPAMINE TRANSDERMAL SYSTEM 1 MG/1
1 PATCH, EXTENDED RELEASE TRANSDERMAL
Status: DISCONTINUED | OUTPATIENT
Start: 2019-08-09 | End: 2019-08-10 | Stop reason: HOSPADM

## 2019-08-09 RX ORDER — METOPROLOL SUCCINATE 25 MG/1
25 TABLET, EXTENDED RELEASE ORAL DAILY
Status: DISCONTINUED | OUTPATIENT
Start: 2019-08-10 | End: 2019-08-09

## 2019-08-09 RX ORDER — LIDOCAINE HCL/PF 100 MG/5ML
SYRINGE (ML) INJECTION PRN
Status: DISCONTINUED | OUTPATIENT
Start: 2019-08-09 | End: 2019-08-09 | Stop reason: SDUPTHER

## 2019-08-09 RX ORDER — SODIUM CHLORIDE 9 MG/ML
INJECTION, SOLUTION INTRAVENOUS CONTINUOUS
Status: DISCONTINUED | OUTPATIENT
Start: 2019-08-09 | End: 2019-08-10 | Stop reason: HOSPADM

## 2019-08-09 RX ORDER — HYDROCHLOROTHIAZIDE 25 MG/1
25 TABLET ORAL DAILY
Status: DISCONTINUED | OUTPATIENT
Start: 2019-08-09 | End: 2019-08-09 | Stop reason: ALTCHOICE

## 2019-08-09 RX ORDER — BUPROPION HYDROCHLORIDE 150 MG/1
150 TABLET, EXTENDED RELEASE ORAL 2 TIMES DAILY
Status: DISCONTINUED | OUTPATIENT
Start: 2019-08-09 | End: 2019-08-10 | Stop reason: HOSPADM

## 2019-08-09 RX ORDER — FENTANYL CITRATE 50 UG/ML
INJECTION, SOLUTION INTRAMUSCULAR; INTRAVENOUS PRN
Status: DISCONTINUED | OUTPATIENT
Start: 2019-08-09 | End: 2019-08-09 | Stop reason: SDUPTHER

## 2019-08-09 RX ORDER — DOCUSATE SODIUM 100 MG/1
100 CAPSULE, LIQUID FILLED ORAL 2 TIMES DAILY
Status: DISCONTINUED | OUTPATIENT
Start: 2019-08-09 | End: 2019-08-10 | Stop reason: HOSPADM

## 2019-08-09 RX ORDER — NEOSTIGMINE METHYLSULFATE 5 MG/5 ML
SYRINGE (ML) INTRAVENOUS PRN
Status: DISCONTINUED | OUTPATIENT
Start: 2019-08-09 | End: 2019-08-09 | Stop reason: SDUPTHER

## 2019-08-09 RX ORDER — SODIUM CHLORIDE 0.9 % (FLUSH) 0.9 %
10 SYRINGE (ML) INJECTION EVERY 12 HOURS SCHEDULED
Status: DISCONTINUED | OUTPATIENT
Start: 2019-08-09 | End: 2019-08-10 | Stop reason: HOSPADM

## 2019-08-09 RX ORDER — HYDROMORPHONE HCL 110MG/55ML
PATIENT CONTROLLED ANALGESIA SYRINGE INTRAVENOUS PRN
Status: DISCONTINUED | OUTPATIENT
Start: 2019-08-09 | End: 2019-08-09 | Stop reason: SDUPTHER

## 2019-08-09 RX ORDER — SODIUM CHLORIDE 0.9 % (FLUSH) 0.9 %
10 SYRINGE (ML) INJECTION PRN
Status: DISCONTINUED | OUTPATIENT
Start: 2019-08-09 | End: 2019-08-09 | Stop reason: HOSPADM

## 2019-08-09 RX ADMIN — VANCOMYCIN HYDROCHLORIDE 2000 MG: 1 INJECTION, POWDER, LYOPHILIZED, FOR SOLUTION INTRAVENOUS at 11:00

## 2019-08-09 RX ADMIN — SODIUM CHLORIDE: 9 INJECTION, SOLUTION INTRAVENOUS at 10:09

## 2019-08-09 RX ADMIN — OXYCODONE HYDROCHLORIDE AND ACETAMINOPHEN 2 TABLET: 5; 325 TABLET ORAL at 23:21

## 2019-08-09 RX ADMIN — Medication 2 G: at 10:31

## 2019-08-09 RX ADMIN — Medication 100 MG: at 10:16

## 2019-08-09 RX ADMIN — SODIUM CHLORIDE: 9 INJECTION, SOLUTION INTRAVENOUS at 10:47

## 2019-08-09 RX ADMIN — PROPOFOL 25 MCG/KG/MIN: 10 INJECTION, EMULSION INTRAVENOUS at 11:05

## 2019-08-09 RX ADMIN — MIDAZOLAM HYDROCHLORIDE 2 MG: 1 INJECTION, SOLUTION INTRAMUSCULAR; INTRAVENOUS at 10:10

## 2019-08-09 RX ADMIN — ROCURONIUM BROMIDE 30 MG: 10 INJECTION INTRAVENOUS at 10:16

## 2019-08-09 RX ADMIN — OXYCODONE HYDROCHLORIDE AND ACETAMINOPHEN 2 TABLET: 5; 325 TABLET ORAL at 17:13

## 2019-08-09 RX ADMIN — DOCUSATE SODIUM 100 MG: 100 CAPSULE, LIQUID FILLED ORAL at 17:13

## 2019-08-09 RX ADMIN — SODIUM CHLORIDE: 9 INJECTION, SOLUTION INTRAVENOUS at 07:15

## 2019-08-09 RX ADMIN — DOCUSATE SODIUM 100 MG: 100 CAPSULE, LIQUID FILLED ORAL at 21:11

## 2019-08-09 RX ADMIN — BUPROPION HYDROCHLORIDE 150 MG: 150 TABLET, FILM COATED, EXTENDED RELEASE ORAL at 21:12

## 2019-08-09 RX ADMIN — HYDROMORPHONE HYDROCHLORIDE 0.5 MG: 2 INJECTION INTRAMUSCULAR; INTRAVENOUS; SUBCUTANEOUS at 10:59

## 2019-08-09 RX ADMIN — ONDANSETRON HYDROCHLORIDE 4 MG: 4 INJECTION, SOLUTION INTRAMUSCULAR; INTRAVENOUS at 11:55

## 2019-08-09 RX ADMIN — FENTANYL CITRATE 50 MCG: 50 INJECTION INTRAMUSCULAR; INTRAVENOUS at 12:14

## 2019-08-09 RX ADMIN — FENTANYL CITRATE 100 MCG: 50 INJECTION INTRAMUSCULAR; INTRAVENOUS at 11:05

## 2019-08-09 RX ADMIN — HYDROMORPHONE HYDROCHLORIDE 0.5 MG: 2 INJECTION INTRAMUSCULAR; INTRAVENOUS; SUBCUTANEOUS at 10:46

## 2019-08-09 RX ADMIN — SODIUM CHLORIDE: 9 INJECTION, SOLUTION INTRAVENOUS at 17:13

## 2019-08-09 RX ADMIN — HYDROMORPHONE HYDROCHLORIDE 0.5 MG: 2 INJECTION INTRAMUSCULAR; INTRAVENOUS; SUBCUTANEOUS at 10:41

## 2019-08-09 RX ADMIN — FENTANYL CITRATE 100 MCG: 50 INJECTION INTRAMUSCULAR; INTRAVENOUS at 10:16

## 2019-08-09 RX ADMIN — SPIRONOLACTONE AND HYDROCHLOROTHIAZIDE 1 TABLET: 25; 25 TABLET ORAL at 17:12

## 2019-08-09 RX ADMIN — DEXAMETHASONE SODIUM PHOSPHATE 10 MG: 4 INJECTION, SOLUTION INTRAMUSCULAR; INTRAVENOUS at 10:50

## 2019-08-09 RX ADMIN — PROPOFOL 160 MG: 10 INJECTION, EMULSION INTRAVENOUS at 10:16

## 2019-08-09 RX ADMIN — OMEPRAZOLE 40 MG: 40 CAPSULE, DELAYED RELEASE ORAL at 17:13

## 2019-08-09 RX ADMIN — Medication 4 MG: at 11:55

## 2019-08-09 RX ADMIN — Medication 0.8 MG: at 11:55

## 2019-08-09 RX ADMIN — VANCOMYCIN HYDROCHLORIDE 1500 MG: 1 INJECTION, POWDER, LYOPHILIZED, FOR SOLUTION INTRAVENOUS at 23:21

## 2019-08-09 RX ADMIN — BISACODYL 5 MG: 5 TABLET, COATED ORAL at 17:13

## 2019-08-09 ASSESSMENT — PULMONARY FUNCTION TESTS
PIF_VALUE: 19
PIF_VALUE: 2
PIF_VALUE: 19
PIF_VALUE: 2
PIF_VALUE: 19
PIF_VALUE: 16
PIF_VALUE: 0
PIF_VALUE: 19
PIF_VALUE: 2
PIF_VALUE: 20
PIF_VALUE: 19
PIF_VALUE: 18
PIF_VALUE: 1
PIF_VALUE: 19
PIF_VALUE: 0
PIF_VALUE: 1
PIF_VALUE: 19
PIF_VALUE: 32
PIF_VALUE: 19
PIF_VALUE: 28
PIF_VALUE: 19
PIF_VALUE: 19
PIF_VALUE: 23
PIF_VALUE: 17
PIF_VALUE: 19
PIF_VALUE: 2
PIF_VALUE: 4
PIF_VALUE: 20
PIF_VALUE: 18
PIF_VALUE: 19
PIF_VALUE: 18
PIF_VALUE: 19
PIF_VALUE: 23
PIF_VALUE: 19
PIF_VALUE: 11
PIF_VALUE: 19
PIF_VALUE: 19
PIF_VALUE: 20
PIF_VALUE: 19
PIF_VALUE: 2
PIF_VALUE: 19
PIF_VALUE: 30
PIF_VALUE: 17
PIF_VALUE: 19
PIF_VALUE: 19
PIF_VALUE: 17
PIF_VALUE: 15
PIF_VALUE: 15
PIF_VALUE: 23
PIF_VALUE: 15
PIF_VALUE: 19
PIF_VALUE: 15
PIF_VALUE: 19
PIF_VALUE: 15
PIF_VALUE: 13
PIF_VALUE: 15
PIF_VALUE: 19
PIF_VALUE: 19
PIF_VALUE: 5
PIF_VALUE: 18
PIF_VALUE: 15
PIF_VALUE: 17
PIF_VALUE: 19
PIF_VALUE: 4
PIF_VALUE: 4
PIF_VALUE: 19
PIF_VALUE: 18
PIF_VALUE: 23
PIF_VALUE: 19
PIF_VALUE: 33
PIF_VALUE: 19
PIF_VALUE: 23
PIF_VALUE: 19
PIF_VALUE: 15
PIF_VALUE: 19
PIF_VALUE: 23
PIF_VALUE: 15
PIF_VALUE: 2
PIF_VALUE: 19
PIF_VALUE: 12
PIF_VALUE: 19
PIF_VALUE: 17
PIF_VALUE: 3
PIF_VALUE: 14
PIF_VALUE: 19
PIF_VALUE: 23
PIF_VALUE: 19
PIF_VALUE: 20
PIF_VALUE: 18
PIF_VALUE: 19
PIF_VALUE: 6
PIF_VALUE: 19
PIF_VALUE: 17
PIF_VALUE: 19
PIF_VALUE: 19

## 2019-08-09 ASSESSMENT — PAIN DESCRIPTION - FREQUENCY
FREQUENCY: CONTINUOUS
FREQUENCY: CONTINUOUS

## 2019-08-09 ASSESSMENT — PAIN DESCRIPTION - PAIN TYPE
TYPE: SURGICAL PAIN

## 2019-08-09 ASSESSMENT — PAIN - FUNCTIONAL ASSESSMENT
PAIN_FUNCTIONAL_ASSESSMENT: PREVENTS OR INTERFERES SOME ACTIVE ACTIVITIES AND ADLS
PAIN_FUNCTIONAL_ASSESSMENT: 0-10

## 2019-08-09 ASSESSMENT — PAIN DESCRIPTION - ONSET
ONSET: ON-GOING
ONSET: ON-GOING

## 2019-08-09 ASSESSMENT — PAIN SCALES - GENERAL
PAINLEVEL_OUTOF10: 9
PAINLEVEL_OUTOF10: 5
PAINLEVEL_OUTOF10: 7
PAINLEVEL_OUTOF10: 9

## 2019-08-09 ASSESSMENT — PAIN DESCRIPTION - DESCRIPTORS
DESCRIPTORS: SHARP
DESCRIPTORS: ACHING
DESCRIPTORS: ACHING

## 2019-08-09 ASSESSMENT — PAIN DESCRIPTION - LOCATION
LOCATION: BACK

## 2019-08-09 ASSESSMENT — PAIN DESCRIPTION - ORIENTATION
ORIENTATION: MID

## 2019-08-09 ASSESSMENT — PAIN DESCRIPTION - DIRECTION: RADIATING_TOWARDS: NON

## 2019-08-09 ASSESSMENT — PAIN DESCRIPTION - PROGRESSION: CLINICAL_PROGRESSION: GRADUALLY WORSENING

## 2019-08-09 NOTE — BRIEF OP NOTE
Brief Postoperative Note  ______________________________________________________________    Patient: Hong Gonzalez  YOB: 1965  MRN: [id number]  Date of Procedure: 8/9/2019    Pre-Op Diagnosis:   1) 4 and one-half months status post spinal cord stimulator and battery removal.  2) Post laminectomy syndrome. 3) Chronic pain syndrome. 4) Prior L3-S1 decompression and fusion. Post-Op Diagnosis: Same       Procedure(s):  T7-8 SPINAL CORD STIMULATOR AND BATTERY INSERTION    Anesthesia: General    Surgeon(s):  Susy Cortes MD    Assistant: Bere Bain PA-C    Estimated Blood Loss (mL): 213 mL    Complications: None    Specimens:   * No specimens in log *    Implants:  Implant Name Type Inv.  Item Serial No.  Lot No. LRB No. Used   ANCHOR X CLIK Spine ANCHOR X CLIK  [employer]: INTERVENTIONAL CARDIO [id number] N/A 1   KIT IPG SPECTRA WAVEWRITER - [id number] Spine:Stimulator KIT IPG SPECTRA WAVEWRITER [id number] [employer]: INTERVENTIONAL CARDIO  N/A 1   IMPL SPINAL CORD LEAD SURG 50CM 2X8 - [id number] Spine:Stimulator IMPL SPINAL CORD LEAD SURG 50CM 2X8 [id number] [employer]: INTERVENTIONAL CARDIO  N/A 1   BAND TISSUE SUTURE SYSTEM FIXATE - 2 PK Spine:Stimulator BAND TISSUE SUTURE SYSTEM FIXATE - 2 PK  [address] [id number] N/A 1         Drains:   Closed/Suction Drain Left Back Accordion (Active)       Findings: No significant findings, scar tissue from prior surgery    ZAYRA Guy  Date: 8/9/2019  Time: 12:26 PM

## 2019-08-09 NOTE — FLOWSHEET NOTE
Pt was anointed. 08/09/19 1727   Encounter Summary   Services provided to: Patient   Referral/Consult From: Rounding   Place of [address] Visiting Yes  (8/9  )   Complexity of Encounter Moderate   Length of Encounter 15 minutes   Spiritual/Orthodoxy   Type Spiritual support   Assessment Approachable;Calm   Intervention Prayer;Nurtured hope; Active listening;Empowerment;Sustaining presence/ Ministry of presence   Outcome Connection/belonging;Expressed gratitude;Encouraged; Hopeful;Receptive   Sacraments   Sacrament of Sick-Anointing Anointed

## 2019-08-09 NOTE — ANESTHESIA PRE PROCEDURE
chloride flush 0.9 % injection 10 mL  10 mL Intravenous 2 times per day ZAYRA Saha        sodium chloride flush 0.9 % injection 10 mL  10 mL Intravenous PRN ZAYRA Saha        ceFAZolin (ANCEF) 2 g in dextrose 5 % 50 mL IVPB  2 g Intravenous On Call to [address] Z[address]           Allergies: Allergies   Allergen Reactions    Contrast [Iodides] Anaphylaxis     With IV contrast      Ampicillin Rash    Vancomycin Itching, Rash and Other (See Comments)     Tingling in lips       Problem List:    Patient Active Problem List   Diagnosis Code    GERD (gastroesophageal reflux disease) K21.9    Dysphonia R49.0    Dysphagia R13.10    Left thyroid nodule E04.1    Pyelonephritis N12    Leukocytosis D72.829    Hypertension I10    Weakness R53.1    Depression F32.9    History of fusion of cervical spine Z98.1    Status post partial thyroidectomy Z98.890    Presbyesophagus K22.8    Obesity (BMI 30-39. 9) E66.9    Obstructive sleep apnea on CPAP G47.33, Z99.89    EKG abnormalities R94.31    Tachycardia R00.0    Chronic pain syndrome G89.4    Infection of spinal cord stimulator ([address] T85.733A       Past Medical History:        Diagnosis Date    Chronic back pain     Depression 3/24/2013    Diabetes mellitus ([address]     GERD (gastroesophageal reflux disease)     Hypertension     Obesity 3/24/2013    HENRY on CPAP     Osteoarthritis     PONV (postoperative nausea and vomiting)     Prolonged emergence from general anesthesia     Pyelonephritis 3/24/2013    Unspecified sleep apnea     with C-Pap    UTI (urinary tract infection)        Past Surgical History:        Procedure Laterality Date    ANTERIOR CRUCIATE LIGAMENT REPAIR Left 2002    BACK SURGERY      lumbar    BACK SURGERY  05/01/2018    herniated disk     CHOLECYSTECTOMY, LAPAROSCOPIC N/A 6/18/2019    LAPARASCOPIC CHOLECYSTECTOMY performed by Kapil Diaz MD at [address]    Left wrist, hand and ankle  DILATATION, ESOPHAGUS      ENDOMETRIAL ABLATION  2008    Dr Lamar Vásquez, TOTAL ABDOMINAL  11/09/2016    Robotic    LOBECTOMY, THYROID  5/21/10    right lobectomy    NASAL SINUS SURGERY  11/2017    Dr. Margaret Early in [facility] [address] N/A 2/22/2019    T7-8 SPINAL CORD STIMULATOR IMPLANT performed by Gato Melendez MD at [facility] N/A 3/21/2019    THORACIC I&D WITH SPINAL STIMULATOR AND BATTERY REMOVAL performed by Gato Melendez MD at [address] [place] [address]  2/28/13    c5, c6, c7    SPINAL FUSION  10/03/2017       Social History:    Social History     Tobacco Use    Smoking status: Never Smoker    Smokeless tobacco: Never Used   Substance Use Topics    Alcohol use: Yes     Alcohol/week: 0.0 standard drinks     Comment: very rare                                Counseling given: Not Answered      Vital Signs (Current): There were no vitals filed for this visit.                                            BP Readings from Last 3 Encounters:   07/01/19 138/84   06/18/19 120/66   06/18/19 (!) 156/70       NPO Status:                                                                                 BMI:   Wt Readings from Last 3 Encounters:   07/01/19 229 lb (103.9 kg)   06/18/19 229 lb (103.9 kg)   05/15/19 232 lb (105.2 kg)     There is no height or weight on file to calculate BMI.    CBC:   Lab Results   Component Value Date    WBC 6.3 06/13/2019    RBC 4.34 06/13/2019    HGB 11.4 06/13/2019    HCT 36.7 06/13/2019    MCV 84.6 06/13/2019    RDW 12.4 04/16/2018     06/13/2019       CMP:   Lab Results   Component Value Date     07/31/2019    K 4.2 07/31/2019     07/31/2019    CO2 25 07/31/2019    BUN 9 07/31/2019    CREATININE 0.9 07/31/2019    LABGLOM 65 07/31/2019    GLUCOSE 263 07/31/2019    PROT 8.1 04/25/2019    CALCIUM 9.4 07/31/2019    BILITOT 0.3 04/25/2019    ALKPHOS 86 04/25/2019    AST 67

## 2019-08-09 NOTE — OP NOTE
OPERATIVE REPORT     PATIENT NAME: John Cao                              : 1965  MED REC NO:  [id number]                         ACCOUNT NO: [de-identified]                               ROOM: 011  ADMISSION DATE: 2019    PROVIDER:  Belkis Tobar. Royal Hernandez M.D.     DATE OF PROCEDURE: 2019     PREOPERATIVE DIAGNOSES:  1. Chronic pain syndrome. 2.  Post-laminectomy syndrome. 3.  4 and a half months s/p T7-8 spinal cord stimulator and battery implant with removal due to infection  4. Prior L3-S1 decompression and fusion  5. Obesity, BMI of 38.1. POSTOPERATIVE DIAGNOSES:  1. Chronic pain syndrome. 2.  Post-laminectomy syndrome. 3.  4 and a half months s/p T7-8 spinal cord stimulator and battery implant with removal due to infection  4. Prior L3-S1 decompression and fusion  5. Obesity, BMI of 38.1. OPERATIONS PERFORMED:  1.  T7-8 spinal cord stimulator implantation. 2.  Battery pack for spinal cord stimulator implantation, right buttock. SURGEON:  Luis Smith M.D.     ASSISTANT:  Patricia Espinoza PA-C     ANESTHESIA:  General.     INDICATIONS:  This is a 27-year-old female who had undergone multiple lumbar surgeries and now suffers from post-laminectomy syndrome. She had a spinal cord stimulator placed approximately 4 months ago that had to be removed shortly secondary to infection. She had significant improvement of her chronic back and right leg pain while the stimulator was in place. She was treated by infectious disease with antibiotics and now would like to have the stimulator reimplanted. Patient has tried full conservative therapy including medication management, epidural steroid injection, and physical therapy. Patient had a spinal cord stimulator trial with good relief of their symptoms. Due to the persistence of symptoms and failure of conservative treatment, patient elected to proceed with surgical treatment and permanent placement.   Patient, therefore, understood the indication for the surgery as well as its risks, benefits, and alternatives. These risks include, but are not limited to paralysis, infection, dural tear, nerve root injury, nonunion, persistent pain, DVT/PE, stroke, MI, death etc.  All questions were answered and informed consent was obtained. DESCRIPTION OF THE PROCEDURE:  The patient was taken to the operating room by Anesthesiology Service and had satisfactory general anesthesia. A first-generation cephalosporin was given within one hour of surgical incision, 2 gm of cefazolin were given IV. 2 g Vancomycin IV also given due to history of infection. Venous thromboembolic prophylaxis was performed with sequential devices. The patient was then positioned prone on a standard OSI frame with the abdomen hanging free and all bony prominences well padded. The low back was then prepped and draped in its entirety in the usual sterile fashion. Before incision, a formal timeout was taken per protocol. We next took a midline longitudinal approach and performed subperiosteal dissection between the disk space at T6-T7, T7-T8 and T8-T9. Patient had healthy appearing tissue, with no signs of residual infection. Intraoperative radiographic localization of level was confirmed with fluoroscopy. Satisfied with this, we performed laminotomy of T7-T8 and T6-T7 junction. Microcurettes were used to remove scar tissue from her prior operation. We then inserted the paddle lead underneath the body to cover the entire T7 body. This was then confirmed on fluoroscopy to be in good position. The paddle was then anchored down using anchor clik x 2. Satisfied with this, we then tunneled the leads to the subcutaneous tissue down to the right buttock making an incision over the right buttock to receive the battery. Battery was then inserted into the subcutaneous tissue. Battery was tested to make sure it has good contact with the leads.  While this was done, everything was secured in place. Tiselle applied over top the inferior paddle to decrease chance of movement. Final x-rays taken demonstrating good position of the spinal cord stimulator and the T7-T8 space and the battery pack in the right buttock. Satisfied with this, we then achieved hemostasis. We then copiously irrigated the wound. We then inserted a Hemovac drain through separate stab incision in the thoracic wound. The thoracic wound was then closed with interrupted 1 and 2-0 Vicryl sutures and right buttock wound closed with 2-0 Vicryl sutures. A 3-0 Monocryl was used for the skin. The skin edges were sealed with Dermabond. A dry sterile dressing was applied. The patient was then returned to the hospital bed, extubated, and taken to the recovery room in stable condition. Spinal cord monitoring remained stable throughout the operation. COMPLICATIONS:  None. SPECIMENS:  None. ESTIMATED BLOOD LOSS:  150 mL. POSTOPERATIVE CARE:  The patient will be recovered in PACU and then the regular nursing floor. Patient will receive 24 hours of IV post-op antibiotics. Once the drainage is low and pain is under control, patient will be discharged per clinical indication. Patient will follow up in the office in six weeks. MODIFIER 22:  Due to the patient's BMI greater than 30, modifier 22 will be applied. The patient's case taking significantly longer due to poor visualization and orientation. Spinal cord monitoring remained stable throughout the operation. Nii Gonzalez M.D. Irvin Bobby, PAC, assisted throughout the procedure with positioning, draping, retraction, wound closure, dressing, and splint application.     Mary Vang MD

## 2019-08-09 NOTE — PROGRESS NOTES
1230 Arouses to name on arrival to PACU with O2 3L NC , HOB elevated   1235 awake and oriented , pt denies any nausea but states back pain a # 7 but drifts back to sleep during conversation   1250 eyes closed resp easy   1300 pt awakens on own pain remains a # 7 bit states she feels very sleepy , PO pain meds discussed, pt would prefer to wait to eat something before taking pills R/T HX of PONV  1307 andie from [address] here for spinal stimulator , attemtpted to call Sheela de león nurse on  Another phone   [phone] set up and pt states pain a # 5 and tolerated   1320 attempted to contact  , he went to get Lunch stated per Volunteer   1330 Pt states pain tolerable and denies any nausea , meets criteria for discharge , transported to [facility]

## 2019-08-10 VITALS
OXYGEN SATURATION: 100 % | SYSTOLIC BLOOD PRESSURE: 118 MMHG | WEIGHT: 228.4 LBS | DIASTOLIC BLOOD PRESSURE: 66 MMHG | TEMPERATURE: 98.2 F | BODY MASS INDEX: 38.05 KG/M2 | HEIGHT: 65 IN | RESPIRATION RATE: 17 BRPM | HEART RATE: 73 BPM

## 2019-08-10 PROCEDURE — 2580000003 HC RX 258: Performed by: PHYSICIAN ASSISTANT

## 2019-08-10 PROCEDURE — 6370000000 HC RX 637 (ALT 250 FOR IP): Performed by: PHYSICIAN ASSISTANT

## 2019-08-10 RX ORDER — SULFAMETHOXAZOLE AND TRIMETHOPRIM 800; 160 MG/1; MG/1
1 TABLET ORAL 2 TIMES DAILY
Qty: 20 TABLET | Refills: 0 | Status: SHIPPED | OUTPATIENT
Start: 2019-08-10 | End: 2019-08-20

## 2019-08-10 RX ORDER — CEPHALEXIN 500 MG/1
500 CAPSULE ORAL 2 TIMES DAILY
Qty: 14 CAPSULE | Refills: 0 | Status: SHIPPED | OUTPATIENT
Start: 2019-08-10 | End: 2019-08-17

## 2019-08-10 RX ADMIN — METOPROLOL SUCCINATE 25 MG: 25 TABLET, EXTENDED RELEASE ORAL at 07:58

## 2019-08-10 RX ADMIN — OXYCODONE HYDROCHLORIDE AND ACETAMINOPHEN 2 TABLET: 5; 325 TABLET ORAL at 08:53

## 2019-08-10 RX ADMIN — DOCUSATE SODIUM 100 MG: 100 CAPSULE, LIQUID FILLED ORAL at 07:59

## 2019-08-10 RX ADMIN — OMEPRAZOLE 40 MG: 40 CAPSULE, DELAYED RELEASE ORAL at 06:28

## 2019-08-10 RX ADMIN — Medication 10 ML: at 07:59

## 2019-08-10 RX ADMIN — BUPROPION HYDROCHLORIDE 150 MG: 150 TABLET, FILM COATED, EXTENDED RELEASE ORAL at 07:59

## 2019-08-10 RX ADMIN — BISACODYL 5 MG: 5 TABLET, COATED ORAL at 07:59

## 2019-08-10 RX ADMIN — SPIRONOLACTONE AND HYDROCHLOROTHIAZIDE 1 TABLET: 25; 25 TABLET ORAL at 07:58

## 2019-08-10 ASSESSMENT — PAIN DESCRIPTION - LOCATION: LOCATION: BACK

## 2019-08-10 ASSESSMENT — PAIN DESCRIPTION - DESCRIPTORS: DESCRIPTORS: ACHING

## 2019-08-10 ASSESSMENT — PAIN - FUNCTIONAL ASSESSMENT: PAIN_FUNCTIONAL_ASSESSMENT: PREVENTS OR INTERFERES SOME ACTIVE ACTIVITIES AND ADLS

## 2019-08-10 ASSESSMENT — PAIN DESCRIPTION - ORIENTATION: ORIENTATION: MID

## 2019-08-10 ASSESSMENT — PAIN SCALES - GENERAL
PAINLEVEL_OUTOF10: 4
PAINLEVEL_OUTOF10: 6
PAINLEVEL_OUTOF10: 2

## 2019-08-10 ASSESSMENT — PAIN DESCRIPTION - PROGRESSION: CLINICAL_PROGRESSION: GRADUALLY WORSENING

## 2019-08-10 ASSESSMENT — PAIN DESCRIPTION - ONSET: ONSET: ON-GOING

## 2019-08-10 ASSESSMENT — PAIN DESCRIPTION - PAIN TYPE: TYPE: SURGICAL PAIN

## 2019-08-10 ASSESSMENT — PAIN DESCRIPTION - FREQUENCY: FREQUENCY: CONTINUOUS

## 2019-08-10 NOTE — PLAN OF CARE
Planning:  Goal: Patients continuum of care needs are met  Description  Patients continuum of care needs are met  Outcome: Ongoing  Note:   Pt plans home at discharge. Care manager and social working helping with discharge needs. Problem: Pain:  Goal: Control of acute pain  Description  Control of acute pain  Outcome: Completed  Note:   duplicate     Problem: Pain:  Goal: Control of chronic pain  Description  Control of chronic pain  Outcome: Completed  Note:   duplicate     Problem: Respiratory  Goal: O2 Sat > 90%  Outcome: Completed  Note:   Pt sats 97 on room air. No shortness of breath noted. Lungs clear, uses IS, and able to C&DB  as ordered. Care plan reviewed with patient. Patient verbalize understanding of the plan of care and contribute to goal setting.

## 2019-08-23 ENCOUNTER — APPOINTMENT (OUTPATIENT)
Dept: GENERAL RADIOLOGY | Age: 54
End: 2019-08-23
Payer: COMMERCIAL

## 2019-08-23 ENCOUNTER — HOSPITAL ENCOUNTER (EMERGENCY)
Age: 54
Discharge: HOME | End: 2019-08-23
Attending: EMERGENCY MEDICINE
Payer: COMMERCIAL

## 2019-08-23 VITALS
DIASTOLIC BLOOD PRESSURE: 55 MMHG | HEART RATE: 82 BPM | TEMPERATURE: 98.1 F | BODY MASS INDEX: 36.65 KG/M2 | OXYGEN SATURATION: 97 % | HEIGHT: 65 IN | SYSTOLIC BLOOD PRESSURE: 100 MMHG | WEIGHT: 220 LBS | RESPIRATION RATE: 18 BRPM

## 2019-08-23 DIAGNOSIS — R30.0 DYSURIA: Primary | ICD-10-CM

## 2019-08-23 DIAGNOSIS — R50.9 FEVER, UNSPECIFIED FEVER CAUSE: ICD-10-CM

## 2019-08-23 LAB
ALBUMIN SERPL-MCNC: 3.9 G/DL (ref 3.5–5.1)
ALP BLD-CCNC: 94 U/L (ref 38–126)
ALT SERPL-CCNC: 120 U/L (ref 11–66)
ANION GAP SERPL CALCULATED.3IONS-SCNC: 13 MEQ/L (ref 8–16)
AST SERPL-CCNC: 96 U/L (ref 5–40)
ATYPICAL LYMPHOCYTES: ABNORMAL %
BACTERIA: ABNORMAL
BASOPHILS # BLD: 0.8 %
BASOPHILS ABSOLUTE: 0.1 THOU/MM3 (ref 0–0.1)
BILIRUB SERPL-MCNC: 0.4 MG/DL (ref 0.3–1.2)
BILIRUBIN DIRECT: < 0.2 MG/DL (ref 0–0.3)
BILIRUBIN URINE: NEGATIVE
BLOOD, URINE: NEGATIVE
BUN BLDV-MCNC: 7 MG/DL (ref 7–22)
C-REACTIVE PROTEIN: 2.62 MG/DL (ref 0–1)
CALCIUM SERPL-MCNC: 9 MG/DL (ref 8.5–10.5)
CASTS: ABNORMAL /LPF
CASTS: ABNORMAL /LPF
CHARACTER, URINE: CLEAR
CHLORIDE BLD-SCNC: 99 MEQ/L (ref 98–111)
CO2: 25 MEQ/L (ref 23–33)
COLOR: YELLOW
CREAT SERPL-MCNC: 0.8 MG/DL (ref 0.4–1.2)
CRYSTALS: ABNORMAL
EOSINOPHIL # BLD: 3 %
EOSINOPHILS ABSOLUTE: 0.2 THOU/MM3 (ref 0–0.4)
EPITHELIAL CELLS, UA: ABNORMAL /HPF
ERYTHROCYTE [DISTWIDTH] IN BLOOD BY AUTOMATED COUNT: 16 % (ref 11.5–14.5)
ERYTHROCYTE [DISTWIDTH] IN BLOOD BY AUTOMATED COUNT: 47.2 FL (ref 35–45)
GFR SERPL CREATININE-BSD FRML MDRD: 75 ML/MIN/1.73M2
GLUCOSE BLD-MCNC: 165 MG/DL (ref 70–108)
GLUCOSE, URINE: 100 MG/DL
HCT VFR BLD CALC: 34.8 % (ref 37–47)
HEMOGLOBIN: 10.7 GM/DL (ref 12–16)
IMMATURE GRANS (ABS): 0.04 THOU/MM3 (ref 0–0.07)
IMMATURE GRANULOCYTES: 1 %
KETONES, URINE: NEGATIVE
LACTIC ACID: 1.7 MMOL/L (ref 0.5–2.2)
LEUKOCYTE ESTERASE, URINE: NEGATIVE
LIPASE: 12.7 U/L (ref 5.6–51.3)
LYMPHOCYTES # BLD: 50.8 %
LYMPHOCYTES ABSOLUTE: 3.4 THOU/MM3 (ref 1–4.8)
MCH RBC QN AUTO: 25.5 PG (ref 26–33)
MCHC RBC AUTO-ENTMCNC: 30.7 GM/DL (ref 32.2–35.5)
MCV RBC AUTO: 82.9 FL (ref 81–99)
MISCELLANEOUS LAB TEST RESULT: ABNORMAL
MONOCYTES # BLD: 8.3 %
MONOCYTES ABSOLUTE: 0.5 THOU/MM3 (ref 0.4–1.3)
MUCUS: ABNORMAL
NITRITE, URINE: NEGATIVE
NUCLEATED RED BLOOD CELLS: 0 /100 WBC
OSMOLALITY CALCULATION: 275.5 MOSMOL/KG (ref 275–300)
PH UA: 6.5 (ref 5–9)
PLATELET # BLD: 234 THOU/MM3 (ref 130–400)
PMV BLD AUTO: 10.7 FL (ref 9.4–12.4)
POTASSIUM SERPL-SCNC: 3.5 MEQ/L (ref 3.5–5.2)
PROCALCITONIN: 0.3 NG/ML (ref 0.01–0.09)
PROTEIN UA: NEGATIVE MG/DL
RBC # BLD: 4.2 MILL/MM3 (ref 4.2–5.4)
RBC URINE: ABNORMAL /HPF
RENAL EPITHELIAL, UA: ABNORMAL
SCAN OF BLOOD SMEAR: NORMAL
SEDIMENTATION RATE, ERYTHROCYTE: 36 MM/HR (ref 0–20)
SEG NEUTROPHILS: 36.5 %
SEGMENTED NEUTROPHILS ABSOLUTE COUNT: 2.4 THOU/MM3 (ref 1.8–7.7)
SODIUM BLD-SCNC: 137 MEQ/L (ref 135–145)
SPECIFIC GRAVITY UA: 1.01 (ref 1–1.03)
TOTAL PROTEIN: 7.4 G/DL (ref 6.1–8)
UROBILINOGEN, URINE: 0.2 EU/DL (ref 0–1)
WBC # BLD: 6.6 THOU/MM3 (ref 4.8–10.8)
WBC UA: ABNORMAL /HPF
YEAST: ABNORMAL

## 2019-08-23 PROCEDURE — 84145 PROCALCITONIN (PCT): CPT

## 2019-08-23 PROCEDURE — 82248 BILIRUBIN DIRECT: CPT

## 2019-08-23 PROCEDURE — 99284 EMERGENCY DEPT VISIT MOD MDM: CPT

## 2019-08-23 PROCEDURE — 86140 C-REACTIVE PROTEIN: CPT

## 2019-08-23 PROCEDURE — 87040 BLOOD CULTURE FOR BACTERIA: CPT

## 2019-08-23 PROCEDURE — 36415 COLL VENOUS BLD VENIPUNCTURE: CPT

## 2019-08-23 PROCEDURE — 71045 X-RAY EXAM CHEST 1 VIEW: CPT

## 2019-08-23 PROCEDURE — 83605 ASSAY OF LACTIC ACID: CPT

## 2019-08-23 PROCEDURE — 83690 ASSAY OF LIPASE: CPT

## 2019-08-23 PROCEDURE — 80053 COMPREHEN METABOLIC PANEL: CPT

## 2019-08-23 PROCEDURE — 85651 RBC SED RATE NONAUTOMATED: CPT

## 2019-08-23 PROCEDURE — 2580000003 HC RX 258: Performed by: PHYSICIAN ASSISTANT

## 2019-08-23 PROCEDURE — 81001 URINALYSIS AUTO W/SCOPE: CPT

## 2019-08-23 PROCEDURE — 6370000000 HC RX 637 (ALT 250 FOR IP): Performed by: PHYSICIAN ASSISTANT

## 2019-08-23 PROCEDURE — 85025 COMPLETE CBC W/AUTO DIFF WBC: CPT

## 2019-08-23 RX ORDER — ACETAMINOPHEN 325 MG/1
650 TABLET ORAL ONCE
Status: COMPLETED | OUTPATIENT
Start: 2019-08-23 | End: 2019-08-23

## 2019-08-23 RX ORDER — 0.9 % SODIUM CHLORIDE 0.9 %
1000 INTRAVENOUS SOLUTION INTRAVENOUS ONCE
Status: COMPLETED | OUTPATIENT
Start: 2019-08-23 | End: 2019-08-23

## 2019-08-23 RX ADMIN — ACETAMINOPHEN 650 MG: 325 TABLET ORAL at 18:39

## 2019-08-23 RX ADMIN — SODIUM CHLORIDE 1000 ML: 9 INJECTION, SOLUTION INTRAVENOUS at 18:40

## 2019-08-23 ASSESSMENT — PAIN DESCRIPTION - LOCATION
LOCATION: BACK

## 2019-08-23 ASSESSMENT — ENCOUNTER SYMPTOMS
CONSTIPATION: 0
ABDOMINAL PAIN: 0
SHORTNESS OF BREATH: 0
COUGH: 0
BACK PAIN: 0
WHEEZING: 0
COLOR CHANGE: 0
VOMITING: 0
DIARRHEA: 0
NAUSEA: 0

## 2019-08-23 ASSESSMENT — PAIN DESCRIPTION - PAIN TYPE
TYPE: CHRONIC PAIN
TYPE: CHRONIC PAIN
TYPE: ACUTE PAIN

## 2019-08-23 ASSESSMENT — PAIN SCALES - GENERAL
PAINLEVEL_OUTOF10: 3
PAINLEVEL_OUTOF10: 5
PAINLEVEL_OUTOF10: 4

## 2019-08-23 NOTE — ED PROVIDER NOTES
I have discussed with [facility] and I have reviewed the [facility]'s chart. Please refer to [facility] provider's chart for details. I agree with [facility]'s assessment and plan. CHIEF COMPLAINTS, HISTORY OF ILLNESS AND EVALUATION    This patient is a 49 y. o.female who presents to ED for evaluation of a fever and dysuria. Patient developed these symptoms 4 to 5 days ago. Her highest documented fever at [facility] was 102. Patient has been taking Ibuprofen with temporary improvement. She denies abdominal pain, nausea, or vomiting. Patient also reports concern of infection after having a spinal chord stimulator placed on 08/09 by Dr. Yris Castellanos. Patient denies any back pain at this time. She denies any new numbness, tingling, or weakness. She denies incontinence. Patient denies chest pain or SOB. She has other medical history including diabetes, HTN, and GERD. There are no other complaints or symptoms. LABS    Results for orders placed or performed during the hospital encounter of 08/23/19   CBC auto differential   Result Value Ref Range    WBC 6.6 4.8 - 10.8 thou/mm3    RBC 4.20 4. 20 - 5.40 mill/mm3    Hemoglobin 10.7 (L) 12.0 - 16.0 gm/dl    Hematocrit 34.8 (L) 37.0 - 47.0 %    MCV 82.9 81.0 - 99.0 fL    MCH 25.5 (L) 26.0 - 33.0 pg    MCHC 30.7 (L) 32.2 - 35.5 gm/dl    RDW-CV 16.0 (H) 11.5 - 14.5 %    RDW-SD 47.2 (H) 35.0 - 45.0 fL    Platelets 121 373 - 033 thou/mm3    MPV 10.7 9.4 - 12.4 fL    Seg Neutrophils 36.5 %    Lymphocytes 50.8 %    Monocytes 8.3 %    Eosinophils 3.0 %    Basophils 0.8 %    Immature Granulocytes 1 %    Atypical Lymphocytes FEW %    Segs Absolute 2.4 1.8 - 7.7 thou/mm3    Lymphocytes Absolute 3.4 1.0 - 4.8 thou/mm3    Monocytes Absolute 0.5 0.4 - 1.3 thou/mm3    Eosinophils Absolute 0.2 0.0 - 0.4 thou/mm3    Basophils Absolute 0.1 0.0 - 0.1 thou/mm3    Immature Grans (Abs) 0.04 0.00 - 0.07 thou/mm3    nRBC 0 /100 wbc   Basic Metabolic Panel   Result Value Ref Range    Sodium 137 135 - 145 meq/L    Potassium 3.5 3.5 - 5.2 meq/L    Chloride 99 98 - 111 meq/L    CO2 25 23 - 33 meq/L    Glucose 165 (H) 70 - 108 mg/dL    BUN 7 7 - 22 mg/dL    CREATININE 0.8 0.4 - 1.2 mg/dL    Calcium 9.0 8.5 - 10.5 mg/dL   Lactic acid, plasma   Result Value Ref Range    Lactic Acid 1.7 0.5 - 2.2 mmol/L   Urinalysis with microscopic   Result Value Ref Range    Glucose, Urine 100 (A) NEGATIVE mg/dl    Bilirubin Urine NEGATIVE NEGATIVE    Ketones, Urine NEGATIVE NEGATIVE    Specific Gravity, UA 1.015 1.002 - 1.03    Blood, Urine NEGATIVE NEGATIVE    pH, UA 6.5 5.0 - 9.0    Protein, UA NEGATIVE NEGATIVE mg/dl    Urobilinogen, Urine 0.2 0.0 - 1.0 eu/dl    Nitrite, Urine NEGATIVE NEGATIVE    Leukocyte Esterase, Urine NEGATIVE NEGATIVE    Color, UA YELLOW YELLOW-STR    Character, Urine CLEAR CLR-SL.DARIAN    RBC, UA 0-2 0-2/hpf /hpf    WBC, UA 0-2 0-4/hpf /hpf    Epi Cells 25-50 3-5/hpf /hpf    Mucus, UA THREADS NONE SEEN/    Bacteria, UA NONE FEW/NONE S    Casts 0-4 HYALINE NONE SEEN /lpf    Crystals NONE SEEN NONE SEEN    Renal Epithelial, Urine NONE SEEN NONE SEEN    Yeast, UA NONE SEEN NONE SEEN    Casts NONE SEEN /lpf    Miscellaneous Lab Test Result NONE SEEN    C-reactive protein   Result Value Ref Range    CRP 2.62 (H) 0.00 - 1.00 mg/dl   Hepatic function panel   Result Value Ref Range    Alb 3.9 3.5 - 5.1 g/dL    Total Bilirubin 0.4 0.3 - 1.2 mg/dL    Bilirubin, Direct <0.2 0.0 - 0.3 mg/dL    Alkaline Phosphatase 94 38 - 126 U/L    AST 96 (H) 5 - 40 U/L     (H) 11 - 66 U/L    Total Protein 7.4 6.1 - 8.0 g/dL   Lipase   Result Value Ref Range    Lipase 12.7 5.6 - 51.3 U/L   Anion Gap   Result Value Ref Range    Anion Gap 13.0 8.0 - 16.0 meq/L   Osmolality   Result Value Ref Range    Osmolality Calc 275.5 275.0 - 300 mOsmol/kg   Glomerular Filtration Rate, Estimated   Result Value Ref Range    Est, Glom Filt Rate 75 (A) ml/min/1.73m2   Procalcitonin   Result Value Ref Range    Procalcitonin 0.30 (H) 0.01 - 0.09 ng/mL

## 2019-08-23 NOTE — ED NOTES
Pt is resting on cart with call light in reach. Pt is updated on POC and pending results. Will continue to monitor the patient.      Amarilis Esparza RN  08/23/19 [id number]

## 2019-08-23 NOTE — ED PROVIDER NOTES
subscore is 4. GCS verbal subscore is 5. GCS motor subscore is 6. Skin: Skin is warm. No rash noted. She is diaphoretic (mild). No erythema. No pallor. Well-healing surgical site to back   Psychiatric: She has a normal mood and affect. Her behavior is normal. Thought content normal.   Nursing note and vitals reviewed. DIFFERENTIAL DIAGNOSIS:   Includes but not limited to: UTI, cystitis, pneumonia, sepsis, surgical site infection. Low suspicion for epidural abscess based on history and physical exam.     DIAGNOSTIC RESULTS     EKG: All EKG's are interpreted by the Emergency Department Physician who either signs or Co-signsthis chart in the absence of a cardiologist.  None     RADIOLOGY: non-plain film images(s) such as CT, Ultrasound and MRI are read by the radiologist.    XR CHEST PORTABLE   Final Result   There is mild prominence of the interstitium which is nonspecific. Findings may be related to be just mild vascular crowding. Correlation with symptoms advised no discrete lobar consolidation. **This report has been created using voice recognition software. It may contain minor errors which are inherent in voice recognition technology. **      Final report electronically signed by Dr. Deepa Freeman on 8/23/2019 9:07 PM          LABS:      Labs Reviewed   CBC WITH AUTO DIFFERENTIAL - Abnormal; Notable for the following components:       Result Value    Hemoglobin 10.7 (*)     Hematocrit 34.8 (*)     MCH 25.5 (*)     MCHC 30.7 (*)     RDW-CV 16.0 (*)     RDW-SD 47.2 (*)     All other components within normal limits   BASIC METABOLIC PANEL - Abnormal; Notable for the following components:    Glucose 165 (*)     All other components within normal limits   URINALYSIS WITH MICROSCOPIC - Abnormal; Notable for the following components:    Glucose, Urine 100 (*)     All other components within normal limits   C-REACTIVE PROTEIN - Abnormal; Notable for the following components:    CRP 2.62 (*)     All other components within normal limits   HEPATIC FUNCTION PANEL - Abnormal; Notable for the following components:    AST 96 (*)      (*)     All other components within normal limits   GLOMERULAR FILTRATION RATE, ESTIMATED - Abnormal; Notable for the following components:    Est, Glom Filt Rate 75 (*)     All other components within normal limits   PROCALCITONIN - Abnormal; Notable for the following components:    Procalcitonin 0.30 (*)     All other components within normal limits   SEDIMENTATION RATE - Abnormal; Notable for the following components:    Sed Rate 36 (*)     All other components within normal limits   CULTURE BLOOD #1    Narrative:     Source: blood-Adult-suboptimal <5.5oz./set volume       Site: Peripheral Vein            Current Antibiotics: not stated   CULTURE BLOOD #2    Narrative:     Source: blood-Adult-suboptimal <5.5oz./set volume       Site: Peripheral Vein            Current Antibiotics: not stated   LACTIC ACID, PLASMA   LIPASE   ANION GAP   OSMOLALITY   SCAN OF BLOOD SMEAR       EMERGENCY DEPARTMENT COURSE:   Vitals:    Vitals:    08/23/19 1707 08/23/19 1848 08/23/19 1939 08/23/19 2038   BP: 132/86 111/64 110/64 (!) 100/55   Pulse: 99 85 81 82   Resp: 18 16 17 18   Temp: 99.8 °F (37.7 °C) 98.8 °F (37.1 °C) 98.1 °F (36.7 °C)    TempSrc: Oral Oral Oral    SpO2: 97% 98% 98% 97%   Weight: 220 lb (99.8 kg)      Height: 5' 5\" (1.651 m)        The patient was seen and evaluated within the ED today with fever and dysuria. Within the department, I observed the patient's vital signs to be within acceptable range, and she was afebrile with a temperature of 99.8F. Temperature was 98.1F at the time of discharge. On exam, I appreciated no midline spinal or paraspinal tenderness of the cervical, thoracic, lumbar spine. There are well-healing surgical incisions the thoracic and lumbar spine without associated tenderness, edema, erythema, or drainage.  Patient has full ROM and strength of the

## 2019-08-23 NOTE — ED TRIAGE NOTES
Pt to ed concerned with an infection due to a stimulator placed on 8- and painful urination. Pt reports that pain is 5/10.

## 2019-08-23 NOTE — ED PROVIDER NOTES
[facility] Triage Note-Initiation of of Medical Screening Exam      Sravanthi Carrera is a 49 y.o. female who presents to the Emergency Department with complaint of fever and UTI like symptoms. The patient states that she developed dysuria and urinary frequency at the beginning of this week. She states that the urinary symptoms have stated to improve, however, has been running fevers off and on for the past 4 days. The patient reports a max temperature of 102°F. She last took Ibuprofen around 14:00 this afternoon. The patient had a spinal cord stimulator placed on 8/9 with Dr. Fatimah Bennett secondary to a history of chronic back pain. The patient states that she is still experiencing some lower back pain, however, her chronic pain has improved since the stimulator was placed. She denies any new numbness, tingling, or weakness. No abdominal pain. No additional complaints or concerns at the time of initial evaluation. Brief Exam  Respiratory: Normal Effort, No respiratory distress  Extremities: normal gait. Moves all extremities  Back: well healing surgical incision to the lumbar spine          Patient was examined in the intake area of the emergency department. In light of the patients chief complaint and history or present illness it appears to be most appropriate to transfer the patient to a higher acuity area of the emergency department where further testing can be completed. In order to expedite the patients course of diagnosis and treatment, laboratory studies and a UA were ordered. Please see additional documentation for workup results, ED course, and disposition.      Scribe:  Gina Shin  8/23/19 5:43 PM Scribing for and in the presence of [employer], CNP    Signed by: Theron Romo, 08/23/19 5:43 PM    Provider:  I personally performed the services described in the documentation, reviewed and edited the documentation which was dictated to the scribe in my presence, and it accurately records my words

## 2019-08-29 LAB
BLOOD CULTURE, ROUTINE: NORMAL
BLOOD CULTURE, ROUTINE: NORMAL

## 2019-09-05 ENCOUNTER — APPOINTMENT (OUTPATIENT)
Dept: GENERAL RADIOLOGY | Age: 54
DRG: 812 | End: 2019-09-05
Payer: COMMERCIAL

## 2019-09-05 ENCOUNTER — APPOINTMENT (OUTPATIENT)
Dept: NUCLEAR MEDICINE | Age: 54
DRG: 812 | End: 2019-09-05
Payer: COMMERCIAL

## 2019-09-05 ENCOUNTER — HOSPITAL ENCOUNTER (INPATIENT)
Age: 54
LOS: 3 days | Discharge: HOME | DRG: 812 | End: 2019-09-08
Attending: INTERNAL MEDICINE | Admitting: FAMILY MEDICINE
Payer: COMMERCIAL

## 2019-09-05 DIAGNOSIS — D64.9 ANEMIA, UNSPECIFIED TYPE: ICD-10-CM

## 2019-09-05 DIAGNOSIS — R53.83 OTHER FATIGUE: Primary | ICD-10-CM

## 2019-09-05 DIAGNOSIS — R06.02 SHORTNESS OF BREATH: ICD-10-CM

## 2019-09-05 LAB
ABO: NORMAL
ALBUMIN SERPL-MCNC: 3.7 G/DL (ref 3.5–5.1)
ALP BLD-CCNC: 82 U/L (ref 38–126)
ALT SERPL-CCNC: 71 U/L (ref 11–66)
ANION GAP SERPL CALCULATED.3IONS-SCNC: 13 MEQ/L (ref 8–16)
ANTIBODY SCREEN: NORMAL
AST SERPL-CCNC: 74 U/L (ref 5–40)
ATYPICAL LYMPHOCYTES: ABNORMAL %
BASOPHILIA: ABNORMAL
BASOPHILS # BLD: 0.3 %
BASOPHILS ABSOLUTE: 0 THOU/MM3 (ref 0–0.1)
BILIRUB SERPL-MCNC: 2 MG/DL (ref 0.3–1.2)
BILIRUBIN DIRECT: 0.7 MG/DL (ref 0–0.3)
BILIRUBIN URINE: NEGATIVE
BLOOD, URINE: NEGATIVE
BUN BLDV-MCNC: 20 MG/DL (ref 7–22)
CALCIUM SERPL-MCNC: 9 MG/DL (ref 8.5–10.5)
CHARACTER, URINE: NORMAL
CHLORIDE BLD-SCNC: 99 MEQ/L (ref 98–111)
CO2: 23 MEQ/L (ref 23–33)
COLOR: YELLOW
CREAT SERPL-MCNC: 0.9 MG/DL (ref 0.4–1.2)
D-DIMER QUANTITATIVE: 2031 NG/ML FEU (ref 0–500)
EOSINOPHIL # BLD: 0.8 %
EOSINOPHILS ABSOLUTE: 0 THOU/MM3 (ref 0–0.4)
ERYTHROCYTE [DISTWIDTH] IN BLOOD BY AUTOMATED COUNT: 21.2 % (ref 11.5–14.5)
ERYTHROCYTE [DISTWIDTH] IN BLOOD BY AUTOMATED COUNT: 62.4 FL (ref 35–45)
FERRITIN: 1069 NG/ML (ref 10–291)
FLU A ANTIGEN: NEGATIVE
FLU B ANTIGEN: NEGATIVE
FOLATE: 10.6 NG/ML (ref 4.8–24.2)
GFR SERPL CREATININE-BSD FRML MDRD: 65 ML/MIN/1.73M2
GLUCOSE BLD-MCNC: 163 MG/DL (ref 70–108)
GLUCOSE URINE: NEGATIVE MG/DL
HCT VFR BLD CALC: 22.2 % (ref 37–47)
HCT VFR BLD CALC: 25.7 % (ref 37–47)
HCT VFR BLD CALC: 25.8 % (ref 37–47)
HEMOCCULT STL QL: NEGATIVE
HEMOGLOBIN: 6.9 GM/DL (ref 12–16)
HEMOGLOBIN: 8.1 GM/DL (ref 12–16)
HEMOGLOBIN: 8.1 GM/DL (ref 12–16)
HETEROPHILE ANTIBODIES: NEGATIVE
IMMATURE GRANS (ABS): 0.08 THOU/MM3 (ref 0–0.07)
IMMATURE GRANULOCYTES: 1 %
INR BLD: 1.13 (ref 0.85–1.13)
IRON SATURATION: 95 % (ref 20–50)
IRON: 310 UG/DL (ref 50–170)
KETONES, URINE: NEGATIVE
LEUKOCYTE ESTERASE, URINE: NEGATIVE
LIPASE: 14.1 U/L (ref 5.6–51.3)
LYMPHOCYTES # BLD: 43 %
LYMPHOCYTES ABSOLUTE: 2.6 THOU/MM3 (ref 1–4.8)
MAGNESIUM: 1.8 MG/DL (ref 1.6–2.4)
MCH RBC QN AUTO: 27.3 PG (ref 26–33)
MCHC RBC AUTO-ENTMCNC: 31.1 GM/DL (ref 32.2–35.5)
MCV RBC AUTO: 87.7 FL (ref 81–99)
MONOCYTES # BLD: 10.4 %
MONOCYTES ABSOLUTE: 0.6 THOU/MM3 (ref 0.4–1.3)
NITRITE, URINE: NEGATIVE
NUCLEATED RED BLOOD CELLS: 0 /100 WBC
OSMOLALITY CALCULATION: 276.3 MOSMOL/KG (ref 275–300)
PATHOLOGIST REVIEW: ABNORMAL
PH UA: 6 (ref 5–9)
PLATELET # BLD: 335 THOU/MM3 (ref 130–400)
PMV BLD AUTO: 9.8 FL (ref 9.4–12.4)
POIKILOCYTES: ABNORMAL
POTASSIUM SERPL-SCNC: 4.5 MEQ/L (ref 3.5–5.2)
PREGNANCY, SERUM: NEGATIVE
PRO-BNP: 23 PG/ML (ref 0–900)
PROTEIN UA: NEGATIVE
RBC # BLD: 2.53 MILL/MM3 (ref 4.2–5.4)
RH FACTOR: NORMAL
SCAN OF BLOOD SMEAR: NORMAL
SEG NEUTROPHILS: 44.2 %
SEGMENTED NEUTROPHILS ABSOLUTE COUNT: 2.7 THOU/MM3 (ref 1.8–7.7)
SODIUM BLD-SCNC: 135 MEQ/L (ref 135–145)
SPECIFIC GRAVITY, URINE: 1.02 (ref 1–1.03)
STOMATOCYTES: ABNORMAL
TOTAL IRON BINDING CAPACITY: < 327 UG/DL (ref 171–450)
TOTAL PROTEIN: 7.4 G/DL (ref 6.1–8)
TROPONIN T: < 0.01 NG/ML
TROPONIN T: < 0.01 NG/ML
UROBILINOGEN, URINE: 0.2 EU/DL (ref 0–1)
VITAMIN B-12: 365 PG/ML (ref 211–911)
WBC # BLD: 6 THOU/MM3 (ref 4.8–10.8)

## 2019-09-05 PROCEDURE — A9540 TC99M MAA: HCPCS | Performed by: INTERNAL MEDICINE

## 2019-09-05 PROCEDURE — 83690 ASSAY OF LIPASE: CPT

## 2019-09-05 PROCEDURE — 87040 BLOOD CULTURE FOR BACTERIA: CPT

## 2019-09-05 PROCEDURE — 94760 N-INVAS EAR/PLS OXIMETRY 1: CPT

## 2019-09-05 PROCEDURE — 80053 COMPREHEN METABOLIC PANEL: CPT

## 2019-09-05 PROCEDURE — 86923 COMPATIBILITY TEST ELECTRIC: CPT

## 2019-09-05 PROCEDURE — A9558 XE133 XENON 10MCI: HCPCS | Performed by: INTERNAL MEDICINE

## 2019-09-05 PROCEDURE — 36415 COLL VENOUS BLD VENIPUNCTURE: CPT

## 2019-09-05 PROCEDURE — 85025 COMPLETE CBC W/AUTO DIFF WBC: CPT

## 2019-09-05 PROCEDURE — 84484 ASSAY OF TROPONIN QUANT: CPT

## 2019-09-05 PROCEDURE — 83735 ASSAY OF MAGNESIUM: CPT

## 2019-09-05 PROCEDURE — 2580000003 HC RX 258: Performed by: INTERNAL MEDICINE

## 2019-09-05 PROCEDURE — 2709999900 HC NON-CHARGEABLE SUPPLY

## 2019-09-05 PROCEDURE — 93005 ELECTROCARDIOGRAM TRACING: CPT | Performed by: INTERNAL MEDICINE

## 2019-09-05 PROCEDURE — 99285 EMERGENCY DEPT VISIT HI MDM: CPT

## 2019-09-05 PROCEDURE — 87804 INFLUENZA ASSAY W/OPTIC: CPT

## 2019-09-05 PROCEDURE — 36430 TRANSFUSION BLD/BLD COMPNT: CPT

## 2019-09-05 PROCEDURE — 86900 BLOOD TYPING SEROLOGIC ABO: CPT

## 2019-09-05 PROCEDURE — 83550 IRON BINDING TEST: CPT

## 2019-09-05 PROCEDURE — 82248 BILIRUBIN DIRECT: CPT

## 2019-09-05 PROCEDURE — 82607 VITAMIN B-12: CPT

## 2019-09-05 PROCEDURE — 71046 X-RAY EXAM CHEST 2 VIEWS: CPT

## 2019-09-05 PROCEDURE — 1200000000 HC SEMI PRIVATE

## 2019-09-05 PROCEDURE — 86308 HETEROPHILE ANTIBODY SCREEN: CPT

## 2019-09-05 PROCEDURE — 83880 ASSAY OF NATRIURETIC PEPTIDE: CPT

## 2019-09-05 PROCEDURE — 82272 OCCULT BLD FECES 1-3 TESTS: CPT

## 2019-09-05 PROCEDURE — P9016 RBC LEUKOCYTES REDUCED: HCPCS

## 2019-09-05 PROCEDURE — 82746 ASSAY OF FOLIC ACID SERUM: CPT

## 2019-09-05 PROCEDURE — 6370000000 HC RX 637 (ALT 250 FOR IP): Performed by: INTERNAL MEDICINE

## 2019-09-05 PROCEDURE — 86901 BLOOD TYPING SEROLOGIC RH(D): CPT

## 2019-09-05 PROCEDURE — 99223 1ST HOSP IP/OBS HIGH 75: CPT | Performed by: INTERNAL MEDICINE

## 2019-09-05 PROCEDURE — 85018 HEMOGLOBIN: CPT

## 2019-09-05 PROCEDURE — 85610 PROTHROMBIN TIME: CPT

## 2019-09-05 PROCEDURE — 83540 ASSAY OF IRON: CPT

## 2019-09-05 PROCEDURE — 87389 HIV-1 AG W/HIV-1&-2 AB AG IA: CPT

## 2019-09-05 PROCEDURE — 85014 HEMATOCRIT: CPT

## 2019-09-05 PROCEDURE — 6360000002 HC RX W HCPCS: Performed by: INTERNAL MEDICINE

## 2019-09-05 PROCEDURE — 82728 ASSAY OF FERRITIN: CPT

## 2019-09-05 PROCEDURE — 78582 LUNG VENTILAT&PERFUS IMAGING: CPT

## 2019-09-05 PROCEDURE — 86850 RBC ANTIBODY SCREEN: CPT

## 2019-09-05 PROCEDURE — 84703 CHORIONIC GONADOTROPIN ASSAY: CPT

## 2019-09-05 PROCEDURE — 3430000000 HC RX DIAGNOSTIC RADIOPHARMACEUTICAL: Performed by: INTERNAL MEDICINE

## 2019-09-05 PROCEDURE — 81003 URINALYSIS AUTO W/O SCOPE: CPT

## 2019-09-05 PROCEDURE — 85379 FIBRIN DEGRADATION QUANT: CPT

## 2019-09-05 RX ORDER — HYDROCHLOROTHIAZIDE 25 MG/1
25 TABLET ORAL DAILY
Status: DISCONTINUED | OUTPATIENT
Start: 2019-09-06 | End: 2019-09-07

## 2019-09-05 RX ORDER — SPIRONOLACTONE AND HYDROCHLOROTHIAZIDE 25; 25 MG/1; MG/1
1 TABLET ORAL DAILY
Status: DISCONTINUED | OUTPATIENT
Start: 2019-09-05 | End: 2019-09-05 | Stop reason: CLARIF

## 2019-09-05 RX ORDER — PANTOPRAZOLE SODIUM 40 MG/1
40 TABLET, DELAYED RELEASE ORAL
Status: DISCONTINUED | OUTPATIENT
Start: 2019-09-06 | End: 2019-09-08 | Stop reason: HOSPADM

## 2019-09-05 RX ORDER — BUPROPION HYDROCHLORIDE 150 MG/1
150 TABLET, EXTENDED RELEASE ORAL 2 TIMES DAILY
COMMUNITY
End: 2022-01-14

## 2019-09-05 RX ORDER — SODIUM CHLORIDE 9 MG/ML
INJECTION, SOLUTION INTRAVENOUS CONTINUOUS
Status: DISCONTINUED | OUTPATIENT
Start: 2019-09-05 | End: 2019-09-08 | Stop reason: HOSPADM

## 2019-09-05 RX ORDER — 0.9 % SODIUM CHLORIDE 0.9 %
250 INTRAVENOUS SOLUTION INTRAVENOUS ONCE
Status: DISCONTINUED | OUTPATIENT
Start: 2019-09-05 | End: 2019-09-08 | Stop reason: HOSPADM

## 2019-09-05 RX ORDER — BUPROPION HYDROCHLORIDE 150 MG/1
150 TABLET, EXTENDED RELEASE ORAL 2 TIMES DAILY
Status: DISCONTINUED | OUTPATIENT
Start: 2019-09-05 | End: 2019-09-08 | Stop reason: HOSPADM

## 2019-09-05 RX ORDER — METOPROLOL SUCCINATE 25 MG/1
25 TABLET, EXTENDED RELEASE ORAL DAILY
Status: DISCONTINUED | OUTPATIENT
Start: 2019-09-06 | End: 2019-09-08 | Stop reason: HOSPADM

## 2019-09-05 RX ORDER — SPIRONOLACTONE 25 MG/1
25 TABLET ORAL DAILY
Status: DISCONTINUED | OUTPATIENT
Start: 2019-09-06 | End: 2019-09-07

## 2019-09-05 RX ORDER — XENON XE-133 10 MCI/1
9 GAS RESPIRATORY (INHALATION)
Status: COMPLETED | OUTPATIENT
Start: 2019-09-05 | End: 2019-09-05

## 2019-09-05 RX ORDER — ACETAMINOPHEN 325 MG/1
650 TABLET ORAL EVERY 4 HOURS PRN
Status: DISCONTINUED | OUTPATIENT
Start: 2019-09-05 | End: 2019-09-08 | Stop reason: HOSPADM

## 2019-09-05 RX ORDER — SODIUM CHLORIDE 0.9 % (FLUSH) 0.9 %
10 SYRINGE (ML) INJECTION EVERY 12 HOURS SCHEDULED
Status: DISCONTINUED | OUTPATIENT
Start: 2019-09-05 | End: 2019-09-06 | Stop reason: SDUPTHER

## 2019-09-05 RX ORDER — SODIUM CHLORIDE 0.9 % (FLUSH) 0.9 %
10 SYRINGE (ML) INJECTION PRN
Status: DISCONTINUED | OUTPATIENT
Start: 2019-09-05 | End: 2019-09-08 | Stop reason: HOSPADM

## 2019-09-05 RX ORDER — ONDANSETRON 2 MG/ML
4 INJECTION INTRAMUSCULAR; INTRAVENOUS EVERY 6 HOURS PRN
Status: DISCONTINUED | OUTPATIENT
Start: 2019-09-05 | End: 2019-09-08 | Stop reason: HOSPADM

## 2019-09-05 RX ADMIN — SODIUM CHLORIDE: 9 INJECTION, SOLUTION INTRAVENOUS at 20:55

## 2019-09-05 RX ADMIN — ONDANSETRON 4 MG: 2 INJECTION INTRAMUSCULAR; INTRAVENOUS at 16:52

## 2019-09-05 RX ADMIN — Medication 3.1 MILLICURIE: at 14:30

## 2019-09-05 RX ADMIN — XENON XE-133 9 MILLICURIE: 10 GAS RESPIRATORY (INHALATION) at 14:25

## 2019-09-05 RX ADMIN — ACETAMINOPHEN 650 MG: 325 TABLET ORAL at 16:52

## 2019-09-05 ASSESSMENT — ENCOUNTER SYMPTOMS
RHINORRHEA: 0
SORE THROAT: 0
COUGH: 1
NAUSEA: 1
VOMITING: 0
ABDOMINAL PAIN: 0
BACK PAIN: 0
EYE DISCHARGE: 0
EYE PAIN: 0
DIARRHEA: 0
SHORTNESS OF BREATH: 1
WHEEZING: 0

## 2019-09-05 ASSESSMENT — PAIN SCALES - GENERAL
PAINLEVEL_OUTOF10: 2
PAINLEVEL_OUTOF10: 0
PAINLEVEL_OUTOF10: 1

## 2019-09-05 NOTE — H&P
25-25 MG per tablet take 1 tablet by mouth once daily 6/18/18  Yes Keiry Fenton MD   metoprolol succinate (TOPROL XL) 25 MG extended release tablet Take 1 tablet by mouth daily 6/18/18  Yes Keiry Fenton MD   CPAP Machine MISC by Does not apply route Please change CPAP pressure to 12 cm H20. 10/9/17  Yes Patria Marin PA-C   ibuprofen (ADVIL;MOTRIN) 600 MG tablet Take 1 tablet by mouth every 6 hours as needed for Pain  Patient taking differently: Take 800 mg by mouth 2 times daily as needed for Pain  11/9/16  Yes Charmayne Siskin,    linaclotide (LINZESS) 145 MCG capsule Take 145 mcg by mouth every morning (before breakfast)   Yes Historical Provider, MD   omeprazole (PRILOSEC) 20 MG capsule Take 40 mg by mouth daily    Yes Historical Provider, MD   [address] [facility] daily. One gummy PRN. Takes seldom. Historical Provider, MD       Allergies:  Contrast [iodides]; Shellfish allergy; Penicillin g; Ampicillin; and Vancomycin    Social History:      The patient currently lives at home    TOBACCO:   reports that she has never smoked. She has never used smokeless tobacco.  ETOH:   reports that she drinks alcohol. Family History:      Reviewed in detail and positive as follows:        Problem Relation Age of Onset    Arthritis Mother     Cancer Mother     High Blood Pressure Mother     Stroke Mother     Heart Attack Maternal Grandmother     Heart Attack Maternal Grandfather     Early Death Father 25        [de-identified]    Diabetes Neg Hx     Heart Disease Neg Hx        Diet:  DIET GENERAL;    REVIEW OF SYSTEMS:   Pertinent positives as noted in the HPI. All other systems reviewed and negative. PHYSICAL EXAM:    BP (!) 117/57   Pulse 90   Temp 98 °F (36.7 °C) (Oral)   Resp 18   Ht 5' 5\" (1.651 m)   Wt 220 lb 6.4 oz (100 kg)   SpO2 96%   BMI 36.68 kg/m²     General appearance:  Noted apparent distress, appears stated age and cooperative.   HEENT:  Normal cephalic, atraumatic without obvious deformity. Pupils equal, round, and reactive to light. Extra ocular muscles intact. Conjunctivae/corneas clear. Neck: Supple, with full range of motion. No jugular venous distention. Trachea midline. Respiratory:  Normal respiratory effort. Clear to auscultation, bilaterally without Rales/Wheezes/Rhonchi; decreased bases bilaterally  Cardiovascular:  Regular rate and rhythm with normal S1/S2 without murmurs, rubs or gallops. Abdomen: Soft, non-tender, non-distended with normal bowel sounds. Musculoskeletal:  No clubbing, cyanosis or edema bilaterally. Full range of motion without deformity. Skin: Skin color, texture, turgor normal.  No rashes or lesions. Neurologic:  Neurovascularly intact without any focal sensory/motor deficits. Cranial nerves: II-XII intact, grossly non-focal.  Psychiatric:  Alert and oriented, thought content appropriate, normal insight  Capillary Refill: Brisk,< 3 seconds   Peripheral Pulses: +2 palpable, equal bilaterally       Labs:     Recent Labs     09/05/19  1145   WBC 6.0   HGB 6.9*   HCT 22.2*        Recent Labs     09/05/19  1145      K 4.5   CL 99   CO2 23   BUN 20   CREATININE 0.9   CALCIUM 9.0     Recent Labs     09/05/19  1145   AST 74*   ALT 71*   BILIDIR 0.7*   BILITOT 2.0*   ALKPHOS 82     Recent Labs     09/05/19  1145   INR 1.13     No results for input(s): Kemal Marcoso in the last 72 hours. Urinalysis:      Lab Results   Component Value Date    NITRU NEGATIVE 09/05/2019    WBCUA 0-2 08/23/2019    BACTERIA NONE 08/23/2019    RBCUA 0-2 08/23/2019    BLOODU NEGATIVE 09/05/2019    SPECGRAV 1.015 08/23/2019    GLUCOSEU NEGATIVE 09/05/2019       Radiology:         NM LUNG VENT/PERFUSION (VQ)   Final Result   1. Very low probability for pulmonary embolism.       Final report electronically signed by Dr. Jonathan Meng on 9/5/2019 3:11 PM      XR CHEST STANDARD (2 VW)   Final Result   Stable radiographic appearance of the chest. No evidence of an acute process. **This report has been created using voice recognition software. It may contain minor errors which are inherent in voice recognition technology. **      Final report electronically signed by Dr. Alfonso Johnston on 9/5/2019 2:34 PM               DVT prophylaxis: [] Lovenox                                 [x] SCDs                                 [] SQ Heparin                                 [] Encourage ambulation           [] Already on Anticoagulation    Code Status: Full Code      PT/OT Eval Status: Encouraged     Disposition:    [x] Home       [] TCU       [] Rehab       [] Psych       [] SNF       [] [place]       [] Other-    ASSESSMENT:    C/Yulia Quiorz 1106 Problems    Diagnosis Date Noted    Symptomatic anemia [D64.9] 09/05/2019     Priority: High    Shortness of breath [R06.02] 09/05/2019     Priority: Medium    Obstructive sleep apnea on CPAP [G47.33, Z99.89] 12/18/2015     Priority: Low    Chronic pain syndrome [G89.4] 02/22/2019    Depression [F32.9] 03/24/2013    Hypertension [I10] 03/24/2013    Weakness [R53.1] 03/24/2013    GERD (gastroesophageal reflux disease) [K21.9] 11/28/2011       PLAN:    Admit to Telemetry Unit  Diet regular  IVF hydration as tolerated  Analgesics PRN  Antiemetics PRN  DVT prophylaxis  PT/OT encouraged, if warranted  IS  Troponin trend  EKG in AM  GI consult, apprec chart recs  Transfuse 1 PRBC  CPAP at night  Anemia work up pending  Will need to re-evaluate home medications in morning  Continue to monitor closely               Thank you SILVERIO Valentin CNP for the opportunity to be involved in this patient's care.     Electronically signed by Clint Lorenzo DO on 9/5/2019 at 4:59 PM

## 2019-09-05 NOTE — ED PROVIDER NOTES
[facility]  eMERGENCY dEPARTMENT eNCOUnter          [address] [facility]       Chief Complaint   Patient presents with    Shortness of Breath    Fatigue       Nurses Notes reviewed and I agree except as noted in the HPI. HISTORY OF PRESENT ILLNESS    Randy Posadas is a 49 y.o. female who presents to the Emergency Department for the evaluation of palpitations, shortness of breath, nausea, dizziness, diaphoresis, increased fatigue and a productive cough onset on Monday. The patient denies any history of similar symptoms. She denies any recent travel, leg trauma, chest trauma, chest pain, emesis, fever, chills, sinus drainage, constipation, diarrhea or dysuria. The patient had a stimulator placed on August 9th but states that she was not bedridden post surgery and went about her normal routine. The patient has sleep apnea and is suppose to wear a CPAP at night but hasn't been using it lately. The patient denies any history of asthma or hyperlipidemia but does take medication for hypertension, depression, GERD and has pre-diabetes. She is currently being treated with Levaquin for a UTI. The patient denies any other symptoms or relevant history at this time. The HPI was provided by the patient. REVIEW OF SYSTEMS     Review of Systems   Constitutional: Positive for fatigue. Negative for appetite change, chills and fever. HENT: Negative for congestion, ear pain, rhinorrhea and sore throat. Eyes: Negative for pain, discharge and visual disturbance. Respiratory: Positive for cough and shortness of breath. Negative for wheezing. Cardiovascular: Positive for palpitations. Negative for chest pain and leg swelling. Gastrointestinal: Positive for nausea. Negative for abdominal pain, diarrhea and vomiting. Genitourinary: Negative for difficulty urinating, dysuria, hematuria and vaginal discharge. Musculoskeletal: Negative for arthralgias, back pain, joint swelling and neck pain. edema or posterior oropharyngeal erythema. Eyes: Conjunctivae and EOM are normal.   Neck: Normal range of motion. Neck supple. No JVD present. Cardiovascular: Normal rate, regular rhythm, normal heart sounds, intact distal pulses and normal pulses. Exam reveals no gallop and no friction rub. No murmur heard. Pulmonary/Chest: Effort normal and breath sounds normal. No respiratory distress. She has no decreased breath sounds. She has no wheezes. She has no rhonchi. She has no rales. Abdominal: Soft. Bowel sounds are normal. She exhibits no distension. There is no tenderness. There is no rebound, no guarding and no CVA tenderness. Musculoskeletal: Normal range of motion. She exhibits no edema. Neurological: She is alert and oriented to person, place, and time. She exhibits normal muscle tone. Coordination normal.   Skin: Skin is warm and dry. No rash noted. She is not diaphoretic. Psychiatric: Her mood appears anxious. Nursing note and vitals reviewed. DIAGNOSTIC RESULTS     EKG: All EKG's are interpreted by the Emergency Department Physician who either signs or Co-signs this chart in the absence of a cardiologist.  EKG interpreted by Юлия Barnett MD:    Vent.  Rate: 111 bpm  ID interval: 148 ms  QRS duration: 76 ms  QTc: 443 ms  P-R-T axes: 34, 27, 58  Sinus tachycardia   No STEMI   When compared with ECG of 20-MAR-2019 10:07,  No significant change was found    RADIOLOGY: non-plain film images(s) such as CT, Ultrasound and MRI are read by the radiologist.    No orders to display        LABS:   Labs Reviewed   CBC WITH AUTO DIFFERENTIAL - Abnormal; Notable for the following components:       Result Value    RBC 2.53 (*)     Hemoglobin 6.9 (*)     Hematocrit 22.2 (*)     MCHC 31.1 (*)     RDW-CV 21.2 (*)     RDW-SD 62.4 (*)     All other components within normal limits   D-DIMER, QUANTITATIVE - Abnormal; Notable for the following components:    D-Dimer, Quant 2031.00 (*)     All other components DISPOSITION/PLAN   Admit    PATIENT REFERRED TO:  SILVERIO Quiñones - ASHIA  [facility]  [phone]            DISCHARGE MEDICATIONS:  New Prescriptions    No medications on file       (Please note that portions of this note were completed with a voice recognition program.  Efforts were made to edit the dictations but occasionally words are mis-transcribed.)    Scribe:  Larry Gomez 9/5/19 11:07 AM Scribing for and in the presence of Jagdeep Becerra MD.    Signed by: Theron Crisostomo, 09/05/19 1:29 PM    Provider:  I personally performed the services described in the documentation, reviewed and edited the documentation which was dictated to the scribe in my presence, and it accurately records my words and actions.     Jagdeep Becerra MD 9/5/19 1:29 PM       Jagdeep Becerra MD  09/05/19 1556

## 2019-09-05 NOTE — ED NOTES
Pt ambulated well to restroom with assistance. Pt to provide urine sample. Pt only able to provide a very small urine sample at this time.

## 2019-09-06 ENCOUNTER — ANESTHESIA EVENT (OUTPATIENT)
Dept: ENDOSCOPY | Age: 54
DRG: 812 | End: 2019-09-06
Payer: COMMERCIAL

## 2019-09-06 ENCOUNTER — ANESTHESIA (OUTPATIENT)
Dept: ENDOSCOPY | Age: 54
DRG: 812 | End: 2019-09-06
Payer: COMMERCIAL

## 2019-09-06 ENCOUNTER — APPOINTMENT (OUTPATIENT)
Dept: ULTRASOUND IMAGING | Age: 54
DRG: 812 | End: 2019-09-06
Payer: COMMERCIAL

## 2019-09-06 VITALS
SYSTOLIC BLOOD PRESSURE: 108 MMHG | RESPIRATION RATE: 10 BRPM | DIASTOLIC BLOOD PRESSURE: 54 MMHG | OXYGEN SATURATION: 100 %

## 2019-09-06 LAB
ABSOLUTE RETIC #: 158 THOU/MM3 (ref 20–115)
ALBUMIN SERPL-MCNC: 3.9 G/DL (ref 3.5–5.1)
ALP BLD-CCNC: 83 U/L (ref 38–126)
ALT SERPL-CCNC: 88 U/L (ref 11–66)
ANION GAP SERPL CALCULATED.3IONS-SCNC: 14 MEQ/L (ref 8–16)
AST SERPL-CCNC: 106 U/L (ref 5–40)
BILIRUB SERPL-MCNC: 2.5 MG/DL (ref 0.3–1.2)
BILIRUBIN DIRECT: 0.9 MG/DL (ref 0–0.3)
BUN BLDV-MCNC: 17 MG/DL (ref 7–22)
CALCIUM SERPL-MCNC: 8.8 MG/DL (ref 8.5–10.5)
CHLORIDE BLD-SCNC: 102 MEQ/L (ref 98–111)
CO2: 21 MEQ/L (ref 23–33)
CREAT SERPL-MCNC: 0.9 MG/DL (ref 0.4–1.2)
DAT IGG: NORMAL
DIRECT COOMBS: NORMAL
EKG ATRIAL RATE: 111 BPM
EKG P AXIS: 34 DEGREES
EKG P-R INTERVAL: 148 MS
EKG Q-T INTERVAL: 326 MS
EKG QRS DURATION: 76 MS
EKG QTC CALCULATION (BAZETT): 443 MS
EKG R AXIS: 27 DEGREES
EKG T AXIS: 58 DEGREES
EKG VENTRICULAR RATE: 111 BPM
ERYTHROCYTE [DISTWIDTH] IN BLOOD BY AUTOMATED COUNT: 21.2 % (ref 11.5–14.5)
ERYTHROCYTE [DISTWIDTH] IN BLOOD BY AUTOMATED COUNT: 62.6 FL (ref 35–45)
FIBRINOGEN: 180 MG/100ML (ref 155–475)
GFR SERPL CREATININE-BSD FRML MDRD: 65 ML/MIN/1.73M2
GLUCOSE BLD-MCNC: 185 MG/DL (ref 70–108)
HAV IGM SER IA-ACNC: NEGATIVE
HCT VFR BLD CALC: 22.3 % (ref 37–47)
HCT VFR BLD CALC: 23.8 % (ref 37–47)
HCT VFR BLD CALC: 25.1 % (ref 37–47)
HEMOGLOBIN: 7 GM/DL (ref 12–16)
HEMOGLOBIN: 7.4 GM/DL (ref 12–16)
HEMOGLOBIN: 7.7 GM/DL (ref 12–16)
HEPATITIS B CORE IGM ANTIBODY: NEGATIVE
HEPATITIS B SURFACE ANTIGEN: NEGATIVE
HEPATITIS C ANTIBODY: NEGATIVE
IMMATURE RETIC FRACT: 39.3 % (ref 3–15.9)
INR BLD: 1.03 (ref 0.85–1.13)
IRON SATURATION: 48 % (ref 20–50)
IRON: 152 UG/DL (ref 50–170)
LD: 421 U/L (ref 100–190)
MCH RBC QN AUTO: 27.9 PG (ref 26–33)
MCHC RBC AUTO-ENTMCNC: 31.4 GM/DL (ref 32.2–35.5)
MCV RBC AUTO: 88.8 FL (ref 81–99)
PLATELET # BLD: 327 THOU/MM3 (ref 130–400)
PMV BLD AUTO: 9.7 FL (ref 9.4–12.4)
POTASSIUM SERPL-SCNC: 4.2 MEQ/L (ref 3.5–5.2)
RBC # BLD: 2.51 MILL/MM3 (ref 4.2–5.4)
RETIC HEMOGLOBIN: 36.2 PG (ref 28.2–35.7)
RETICULOCYTE ABSOLUTE COUNT: 5.9 % (ref 0.5–2)
SODIUM BLD-SCNC: 137 MEQ/L (ref 135–145)
TOTAL IRON BINDING CAPACITY: 318 UG/DL (ref 171–450)
TOTAL PROTEIN: 7.7 G/DL (ref 6.1–8)
WBC # BLD: 5.3 THOU/MM3 (ref 4.8–10.8)

## 2019-09-06 PROCEDURE — 2580000003 HC RX 258: Performed by: INTERNAL MEDICINE

## 2019-09-06 PROCEDURE — 3609012400 HC EGD TRANSORAL BIOPSY SINGLE/MULTIPLE: Performed by: INTERNAL MEDICINE

## 2019-09-06 PROCEDURE — 85046 RETICYTE/HGB CONCENTRATE: CPT

## 2019-09-06 PROCEDURE — 99254 IP/OBS CNSLTJ NEW/EST MOD 60: CPT | Performed by: NURSE PRACTITIONER

## 2019-09-06 PROCEDURE — 80074 ACUTE HEPATITIS PANEL: CPT

## 2019-09-06 PROCEDURE — 86880 COOMBS TEST DIRECT: CPT

## 2019-09-06 PROCEDURE — 85610 PROTHROMBIN TIME: CPT

## 2019-09-06 PROCEDURE — 6370000000 HC RX 637 (ALT 250 FOR IP): Performed by: INTERNAL MEDICINE

## 2019-09-06 PROCEDURE — 0DB68ZX EXCISION OF STOMACH, VIA NATURAL OR ARTIFICIAL OPENING ENDOSCOPIC, DIAGNOSTIC: ICD-10-PCS | Performed by: INTERNAL MEDICINE

## 2019-09-06 PROCEDURE — 83010 ASSAY OF HAPTOGLOBIN QUANT: CPT

## 2019-09-06 PROCEDURE — 36415 COLL VENOUS BLD VENIPUNCTURE: CPT

## 2019-09-06 PROCEDURE — 80048 BASIC METABOLIC PNL TOTAL CA: CPT

## 2019-09-06 PROCEDURE — 85014 HEMATOCRIT: CPT

## 2019-09-06 PROCEDURE — 0DB98ZX EXCISION OF DUODENUM, VIA NATURAL OR ARTIFICIAL OPENING ENDOSCOPIC, DIAGNOSTIC: ICD-10-PCS | Performed by: INTERNAL MEDICINE

## 2019-09-06 PROCEDURE — 7100000000 HC PACU RECOVERY - FIRST 15 MIN: Performed by: INTERNAL MEDICINE

## 2019-09-06 PROCEDURE — 76705 ECHO EXAM OF ABDOMEN: CPT

## 2019-09-06 PROCEDURE — 93010 ELECTROCARDIOGRAM REPORT: CPT | Performed by: INTERNAL MEDICINE

## 2019-09-06 PROCEDURE — 0DB38ZX EXCISION OF LOWER ESOPHAGUS, VIA NATURAL OR ARTIFICIAL OPENING ENDOSCOPIC, DIAGNOSTIC: ICD-10-PCS | Performed by: INTERNAL MEDICINE

## 2019-09-06 PROCEDURE — 85385 FIBRINOGEN ANTIGEN: CPT

## 2019-09-06 PROCEDURE — 2500000003 HC RX 250 WO HCPCS: Performed by: ANESTHESIOLOGY

## 2019-09-06 PROCEDURE — 6360000002 HC RX W HCPCS: Performed by: ANESTHESIOLOGY

## 2019-09-06 PROCEDURE — 83550 IRON BINDING TEST: CPT

## 2019-09-06 PROCEDURE — 99233 SBSQ HOSP IP/OBS HIGH 50: CPT | Performed by: FAMILY MEDICINE

## 2019-09-06 PROCEDURE — 1200000000 HC SEMI PRIVATE

## 2019-09-06 PROCEDURE — 80076 HEPATIC FUNCTION PANEL: CPT

## 2019-09-06 PROCEDURE — 85027 COMPLETE CBC AUTOMATED: CPT

## 2019-09-06 PROCEDURE — 83540 ASSAY OF IRON: CPT

## 2019-09-06 PROCEDURE — 93005 ELECTROCARDIOGRAM TRACING: CPT | Performed by: INTERNAL MEDICINE

## 2019-09-06 PROCEDURE — 3700000001 HC ADD 15 MINUTES (ANESTHESIA): Performed by: INTERNAL MEDICINE

## 2019-09-06 PROCEDURE — 3700000000 HC ANESTHESIA ATTENDED CARE: Performed by: INTERNAL MEDICINE

## 2019-09-06 PROCEDURE — 85018 HEMOGLOBIN: CPT

## 2019-09-06 PROCEDURE — 7100000001 HC PACU RECOVERY - ADDTL 15 MIN: Performed by: INTERNAL MEDICINE

## 2019-09-06 PROCEDURE — 83615 LACTATE (LD) (LDH) ENZYME: CPT

## 2019-09-06 PROCEDURE — 2709999900 HC NON-CHARGEABLE SUPPLY: Performed by: INTERNAL MEDICINE

## 2019-09-06 PROCEDURE — 88305 TISSUE EXAM BY PATHOLOGIST: CPT

## 2019-09-06 RX ORDER — PROPOFOL 10 MG/ML
INJECTION, EMULSION INTRAVENOUS PRN
Status: DISCONTINUED | OUTPATIENT
Start: 2019-09-06 | End: 2019-09-06 | Stop reason: SDUPTHER

## 2019-09-06 RX ORDER — SODIUM CHLORIDE 0.9 % (FLUSH) 0.9 %
10 SYRINGE (ML) INJECTION EVERY 12 HOURS SCHEDULED
Status: DISCONTINUED | OUTPATIENT
Start: 2019-09-06 | End: 2019-09-08 | Stop reason: HOSPADM

## 2019-09-06 RX ORDER — 0.9 % SODIUM CHLORIDE 0.9 %
10 VIAL (ML) INJECTION PRN
Status: CANCELLED | OUTPATIENT
Start: 2019-09-06

## 2019-09-06 RX ORDER — LIDOCAINE HYDROCHLORIDE 20 MG/ML
INJECTION, SOLUTION INFILTRATION; PERINEURAL PRN
Status: DISCONTINUED | OUTPATIENT
Start: 2019-09-06 | End: 2019-09-06 | Stop reason: SDUPTHER

## 2019-09-06 RX ORDER — PANTOPRAZOLE SODIUM 40 MG/1
40 TABLET, DELAYED RELEASE ORAL
Status: CANCELLED | OUTPATIENT
Start: 2019-09-06

## 2019-09-06 RX ADMIN — PROPOFOL 40 MG: 10 INJECTION, EMULSION INTRAVENOUS at 10:47

## 2019-09-06 RX ADMIN — HYDROCHLOROTHIAZIDE 25 MG: 25 TABLET ORAL at 08:26

## 2019-09-06 RX ADMIN — BUPROPION HYDROCHLORIDE 150 MG: 150 TABLET, EXTENDED RELEASE ORAL at 08:26

## 2019-09-06 RX ADMIN — PROPOFOL 40 MG: 10 INJECTION, EMULSION INTRAVENOUS at 10:50

## 2019-09-06 RX ADMIN — PROPOFOL 40 MG: 10 INJECTION, EMULSION INTRAVENOUS at 10:49

## 2019-09-06 RX ADMIN — SODIUM CHLORIDE: 9 INJECTION, SOLUTION INTRAVENOUS at 14:36

## 2019-09-06 RX ADMIN — PROPOFOL 10 MG: 10 INJECTION, EMULSION INTRAVENOUS at 10:53

## 2019-09-06 RX ADMIN — PROPOFOL 20 MG: 10 INJECTION, EMULSION INTRAVENOUS at 10:45

## 2019-09-06 RX ADMIN — BUPROPION HYDROCHLORIDE 150 MG: 150 TABLET, EXTENDED RELEASE ORAL at 21:06

## 2019-09-06 RX ADMIN — ACETAMINOPHEN 650 MG: 325 TABLET ORAL at 05:15

## 2019-09-06 RX ADMIN — PROPOFOL 40 MG: 10 INJECTION, EMULSION INTRAVENOUS at 10:52

## 2019-09-06 RX ADMIN — PANTOPRAZOLE SODIUM 40 MG: 40 TABLET, DELAYED RELEASE ORAL at 05:15

## 2019-09-06 RX ADMIN — SPIRONOLACTONE 25 MG: 25 TABLET ORAL at 08:26

## 2019-09-06 RX ADMIN — SODIUM CHLORIDE: 9 INJECTION, SOLUTION INTRAVENOUS at 06:19

## 2019-09-06 RX ADMIN — PROPOFOL 60 MG: 10 INJECTION, EMULSION INTRAVENOUS at 10:44

## 2019-09-06 RX ADMIN — LIDOCAINE HYDROCHLORIDE 60 MG: 20 INJECTION, SOLUTION INFILTRATION; PERINEURAL at 10:44

## 2019-09-06 RX ADMIN — ACETAMINOPHEN 650 MG: 325 TABLET ORAL at 14:32

## 2019-09-06 RX ADMIN — SODIUM CHLORIDE, PRESERVATIVE FREE 10 ML: 5 INJECTION INTRAVENOUS at 21:07

## 2019-09-06 RX ADMIN — METOPROLOL SUCCINATE 25 MG: 25 TABLET, EXTENDED RELEASE ORAL at 08:26

## 2019-09-06 RX ADMIN — ACETAMINOPHEN 650 MG: 325 TABLET ORAL at 21:06

## 2019-09-06 ASSESSMENT — PAIN SCALES - GENERAL
PAINLEVEL_OUTOF10: 3
PAINLEVEL_OUTOF10: 0
PAINLEVEL_OUTOF10: 2
PAINLEVEL_OUTOF10: 0
PAINLEVEL_OUTOF10: 0
PAINLEVEL_OUTOF10: 3
PAINLEVEL_OUTOF10: 7
PAINLEVEL_OUTOF10: 0
PAINLEVEL_OUTOF10: 3
PAINLEVEL_OUTOF10: 0

## 2019-09-06 ASSESSMENT — ENCOUNTER SYMPTOMS: SHORTNESS OF BREATH: 1

## 2019-09-06 ASSESSMENT — PAIN DESCRIPTION - DESCRIPTORS: DESCRIPTORS: ACHING

## 2019-09-06 ASSESSMENT — PAIN DESCRIPTION - ONSET: ONSET: GRADUAL

## 2019-09-06 ASSESSMENT — PAIN DESCRIPTION - FREQUENCY: FREQUENCY: CONTINUOUS

## 2019-09-06 ASSESSMENT — PAIN - FUNCTIONAL ASSESSMENT: PAIN_FUNCTIONAL_ASSESSMENT: ACTIVITIES ARE NOT PREVENTED

## 2019-09-06 ASSESSMENT — PAIN DESCRIPTION - PAIN TYPE: TYPE: ACUTE PAIN

## 2019-09-06 ASSESSMENT — PAIN DESCRIPTION - LOCATION: LOCATION: HEAD

## 2019-09-06 NOTE — ANESTHESIA PRE PROCEDURE
Department of Anesthesiology  Preprocedure Note       Name:  Roberto Arnett   Age:  49 y.o.  :  1965                                          MRN:  [id number]         Date:  2019      Surgeon: Soto Cordova): Loly Krause MD    Procedure: EGD ESOPHAGOGASTRODUODENOSCOPY (N/A )    Medications prior to admission:   Prior to Admission medications    Medication Sig Start Date End Date Taking? Authorizing Provider   buPROPion [facility] SR) 150 MG extended release tablet Take 150 mg by mouth 2 times daily   Yes Historical Provider, MD   ondansetron (ZOFRAN ODT) 4 MG disintegrating tablet Take 1 tablet by mouth every 8 hours as needed for Nausea or Vomiting 19  Yes Darien Wade PA-C   spironolactone-hydrochlorothiazide (ALDACTAZIDE) 25-25 MG per tablet take 1 tablet by mouth once daily 18  Yes Ailin Jewell MD   metoprolol succinate (TOPROL XL) 25 MG extended release tablet Take 1 tablet by mouth daily 18  Yes Ailin Jewell MD   CPAP Machine MISC by Does not apply route Please change CPAP pressure to 12 cm H20. 10/9/17  Yes Desiree Linn PA-C   ibuprofen (ADVIL;MOTRIN) 600 MG tablet Take 1 tablet by mouth every 6 hours as needed for Pain  Patient taking differently: Take 800 mg by mouth 2 times daily as needed for Pain  16  Yes Sergio Wu DO   linaclotide (LINZESS) 145 MCG capsule Take 145 mcg by mouth every morning (before breakfast)   Yes Historical Provider, MD   omeprazole (PRILOSEC) 20 MG capsule Take 40 mg by mouth daily    Yes Historical Provider, MD   [address] [facility] daily. One gummy PRN. Takes seldom.     Historical Provider, MD       Current medications:    Current Facility-Administered Medications   Medication Dose Route Frequency Provider Last Rate Last Dose    [facility]) extended release tablet 150 mg  150 mg Oral BID Alan Lopez DO   150 mg at 19 0826    linaclotide (LINZESS) capsule 145 mcg  145 mcg Oral QAM AC NEGATIVE 09/05/2019        ABGs: No results found for: PHART, PO2ART, LRD7BHR, XND9FBK, BEART, P0IPIICI     Type & Screen (If Applicable):  Lab Results   Component Value Date    [address] 09/05/2019       Anesthesia Evaluation  Patient summary reviewed   history of anesthetic complications: PONV. Airway: Mallampati: III  TM distance: >3 FB   Neck ROM: full  Mouth opening: > = 3 FB Dental:          Pulmonary:   (+) shortness of breath:  sleep apnea:                             Cardiovascular:    (+) hypertension:,       ECG reviewed                        Neuro/Psych:   (+) psychiatric history:            GI/Hepatic/Renal:   (+) GERD:,           Endo/Other:    (+) Diabetes, . Abdominal:           Vascular:                                        Anesthesia Plan      MAC     ASA 3       Induction: intravenous. Anesthetic plan and risks discussed with patient. Carol Bro.  [address] [facility]   9/6/2019

## 2019-09-06 NOTE — CARE COORDINATION
Care Services:  None  Patient expects to be discharged to:  home w/ spouse  Expected Discharge date:  09/07/19  Follow Up Appointment: Best Day/ Time: Monday AM(prefers mornings)  Discharge Plan: Met with Olman Rahman. She is from with her . She has insurance, a PCP, she understands her medications and can afford them. Olman Rahman denies a need for services or DME at discharge.     Evaluation: No.

## 2019-09-06 NOTE — OP NOTE
[]Yes  Other anticoagulant use last 7 days  [x]No []Yes    Current Facility-Administered Medications:     sodium chloride flush 0.9 % injection 10 mL, 10 mL, Intravenous, 2 times per day, Jose G George MD    buPROPion [facility] SR) extended release tablet 150 mg, 150 mg, Oral, BID, Larisa Orf, DO, 150 mg at 09/06/19 0362    linaclotide (LINZESS) capsule 145 mcg, 145 mcg, Oral, QAM AC, Larisa Orf, DO    metoprolol succinate (TOPROL XL) extended release tablet 25 mg, 25 mg, Oral, Daily, Larisa Orf, DO, 25 mg at 09/06/19 0826    pantoprazole (PROTONIX) tablet 40 mg, 40 mg, Oral, QAM AC, Larisa Orf, DO, 40 mg at 09/06/19 0515    acetaminophen (TYLENOL) tablet 650 mg, 650 mg, Oral, Q4H PRN, Larisa Orf, DO, 650 mg at 09/06/19 0515    sodium chloride flush 0.9 % injection 10 mL, 10 mL, Intravenous, 2 times per day, Larisa Orf, DO    sodium chloride flush 0.9 % injection 10 mL, 10 mL, Intravenous, PRN, Larisa Orf, DO    magnesium hydroxide (MILK OF MAGNESIA) 400 MG/5ML suspension 30 mL, 30 mL, Oral, Daily PRN, Larisa Orf, DO    ondansetron [facility]) injection 4 mg, 4 mg, Intravenous, Q6H PRN, Larisa Orf, DO, 4 mg at 09/05/19 1652    0.9 % sodium chloride bolus, 250 mL, Intravenous, Once, Larisa Orf, DO    spironolactone (ALDACTONE) tablet 25 mg, 25 mg, Oral, Daily, 25 mg at 09/06/19 0826 **AND** hydrochlorothiazide (HYDRODIURIL) tablet 25 mg, 25 mg, Oral, Daily, Larisa Orf, DO, 25 mg at 09/06/19 0826    0.9 % sodium chloride infusion, , Intravenous, Continuous, Larisa Orf, DO, Last Rate: 100 mL/hr at 09/06/19 0035  Prior to Admission medications    Medication Sig Start Date End Date Taking?  Authorizing Provider   [facility]) 150 MG extended release tablet Take 150 mg by mouth 2 times daily   Yes Historical Provider, MD   ondansetron (ZOFRAN ODT) 4 MG disintegrating tablet Take 1 tablet by mouth every 8 hours as needed for Nausea or

## 2019-09-06 NOTE — PROGRESS NOTES
Hospitalist Progress Note      Patient:  Maddy Keyes      Unit/Bed:5K-12/012-A    YOB: 1965    MRN: [id number]       Acct: [de-identified]     PCP: SILVERIO Valentin CNP    Date of Admission: 9/5/2019    Assessment/Plan:    1. Symptomatic anemia with tachycardia, dyspnea -- ??type -- NOT BLOOD LOSS -- ? ??hemolysis   --  hgb 6.9 on admission -> transfused 1 unit PRBC since admission - was up to 8.1 9/5 post transfusion but dropped again 9/6 to 7.0 --> repeat 9/6 and if drops <7 transfuse -- trend q 6 hrs x 3  -- with elevated LFT and bili ???? hemolysis --> check LDH, haptoglobin, retic count, fibrinogen, trend LFT -- d dimer high on admission  -- iron studies ? ?accurate on admission -> ferritin, iron iron sat all HIGH on 9/5 - ?repeat -- B12/folate 9/5 WNL  -- ??need heme c/s  -- GI c/s apprec 9/6 -> EGD done 9/6 w/o any upper GI bleeding -> rec outpt colonoscopy -> FOB (-) on admission 9/5 -- cont monitor  -- ?related to recent spinal stimulator placed 8/8 - no areas of swelling around site ->? Check CT abd but has NO pain to suggest internal bleeding  -- plts normal  -- Hgb normal 3/20/19 but then trended down and was in 11's 6/2019 and then 10.7 on 8/23/19   2. Dyspnea -- due to anemia -- trops (-) x 2, BNP low -- CXR (-) on 9/5 -- VQ scan done for elevated d dimer on admission 9/5 = LOW prob PE -- last echo 2017 EF 65%, no WMA, normal valves  3. Sinus tachycardia -- due to #1 most likely -- improving with IVF and blood -- cont home toprol 25 mg daily - VQ scan 9/5 = low prob PE.    4. Elevated LFT/hyperbilirubinemia -- ??related to #1 -- ?hemolysis as bili up 9/5 --> repeat LFT 9/6 -- no GI c/o. LFT slightly eelvated 8/23/19 but bili was normal --   5. Distal esophagitis/gastritis -- per EGD by Dr. Xiomara Hernandez 9/6/19 -- PPI currently daily -> ?per GI increase to BID?? 6. Essential HTN -- continued on hoem HCT/aldactone --> ?stop with above --   7.  Type 2 DM -- no current meds - monitor 22.2* 25.7* 25.8* 22.3*     --   --  327     Recent Labs     09/05/19  1145 09/06/19  0656    137   K 4.5 4.2   CL 99 102   CO2 23 21*   BUN 20 17   CREATININE 0.9 0.9   CALCIUM 9.0 8.8     Recent Labs     09/05/19  1145   AST 74*   ALT 71*   BILIDIR 0.7*   BILITOT 2.0*   ALKPHOS 82     Recent Labs     09/05/19  1145   INR 1.13     No results for input(s): CKTOTAL, TROPONINI in the last 72 hours. Urinalysis:      Lab Results   Component Value Date    NITRU NEGATIVE 09/05/2019    WBCUA 0-2 08/23/2019    BACTERIA NONE 08/23/2019    RBCUA 0-2 08/23/2019    BLOODU NEGATIVE 09/05/2019    SPECGRAV 1.015 08/23/2019    GLUCOSEU NEGATIVE 09/05/2019       Radiology:  NM LUNG VENT/PERFUSION (VQ)   Final Result   1. Very low probability for pulmonary embolism. Final report electronically signed by Dr. Jonathan Meng on 9/5/2019 3:11 PM      XR CHEST STANDARD (2 VW)   Final Result   Stable radiographic appearance of the chest. No evidence of an acute process. **This report has been created using voice recognition software. It may contain minor errors which are inherent in voice recognition technology. **      Final report electronically signed by Dr. Juan Ramon Scott on 9/5/2019 2:34 PM          Diet: DIET LOW FAT;     Samuels: no    Microbiology:  Blood cx 9/5/19 (-) to date    Influenza 9/5 (-)    FOB 9/5 (-)    Monospot 9/5 (-)    Tele:  no    DVT prophylaxis: [] Lovenox                                 [x] SCDs                                 [] SQ Heparin                                 [] Encourage ambulation           [] Already on Anticoagulation     Disposition:    [x] Home       [] TCU       [] Rehab       [] Psych       [] SNF       [] [facility]       [] Other-    Code Status: Full Code    PT/OT Eval Status: not needed        Electronically signed by Sagar iFerro MD on 9/6/2019 at 12:53 PM when pt evaluated -- final note filed late

## 2019-09-06 NOTE — CONSULTS
[facility]    Patient - Theresa Panchal   MRN -  [id number]   [place] # - [de-identified]   - 1965      Date of Admission -  2019 10:49 AM  Date of evaluation -  2019  Room - [facility]12/012-   Hospital Day - 1  Consulting - Roma Tucker MD Primary Care Physician - SILVERIO Baxtre - CNP       Reason for Consult    Anemia  Active Hospital Problem List      Active Hospital Problems    Diagnosis Date Noted    Obstructive sleep apnea on CPAP [G47.33, Z99.89] 2015     Priority: High    Symptomatic anemia [D64.9] 2019    Shortness of breath [R06.02] 2019    Chronic pain syndrome [G89.4] 2019    Depression [F32.9] 2013    Hypertension [I10] 2013    Weakness [R53.1] 2013    GERD (gastroesophageal reflux disease) [K21.9] 2011     HPI   Theresa Panchal is a 49 y.o. female with PMH of HTN who was admitted for anemia of 6.9. Last Hgb on  was 10.7. She states that she has been feeling very fatigued since 2019 when she was admitted for infection of her implanted stimulator but the fatigue has increased since she had a cholecystectomy in 2019. She has had headache, SOB, tachycardia and is craving ice. She has not been eating normally since her cholecystectomy. She had a hysterectomy 10 years ago. She denies bleeding from nose, gums, blood in stool or urine or new bruising or bleeding. She has not started any new medications but was diagnosed with UTI and completed three days of Levaquin prior to this admission. She denies fever, chills, night sweats,  abdominal pain, nausea/vomiting, diarrhea/constipation, new leg pain or swelling, new neurological symptoms.   Of note she did not have anemia prior to 2019 per Epic and denies anemia diagnosis in the past.       Oncology History    No oncology history  Meds    Current Medications    sodium chloride flush  10 mL Intravenous 2 times per day    buPROPion  150 tracer. No defects are noted. 1. Very low probability for pulmonary embolism. Final report electronically signed by Dr. Tayo Ferreira on 9/5/2019 3:11 PM    Xr Chest Portable    Result Date: 8/23/2019  PROCEDURE: XR CHEST PORTABLE CLINICAL INFORMATION: fever. COMPARISON: March 26, 2019 TECHNIQUE: Portable chest. FINDINGS: Spinal stimulator device. Postsurgical changes lower cervical spine. Mild interstitial prominence which is nonspecific. No discrete lobar consolidation. Costophrenic recesses are preserved. No acute osseous findings. Cardiothymic silhouette is within normal limits. There is mild prominence of the interstitium which is nonspecific. Findings may be related to be just mild vascular crowding. Correlation with symptoms advised no discrete lobar consolidation. **This report has been created using voice recognition software. It may contain minor errors which are inherent in voice recognition technology. ** Final report electronically signed by Dr. Tayo Ferreira on 8/23/2019 9:07 PM    Xr Thoracic Spine 1 Vw    Result Date: 8/9/2019  PROCEDURE: MOBILE THORACIC SPINE/2 views: CLINICAL INFORMATION: pain COMPARISON: No prior study. TECHNIQUE: 2 fluoroscopic spot films of the thoracic spine were obtained during neurostimulator insertion by Dr. Adams Chambres. The actual fluoroscopy time was not reported by our technologist.  FINDINGS: 3 spinal needles are present, presumably indicating ribs at the T12, T9, and T8 levels. The neurostimulator leads are present at the T8 level. Status post neurostimulator insertion by Dr. Adams Chambers. **This report has been created using voice recognition software. It may contain minor errors which are inherent in voice recognition technology. ** Final report electronically signed by Dr. Magen Maxwell on 8/9/2019 2:43 PM    Fluoro For Surgical Procedures    Result Date: 8/9/2019  Radiology exam is complete. No Radiologist dictation. Please follow up with ordering provider. denies pain/discomfort

## 2019-09-06 NOTE — CONSULTS
T7-8 SPINAL CORD STIMULATOR IMPLANT performed by Peg Berkowitz MD at [facility] N/A 3/21/2019    THORACIC I&D WITH SPINAL STIMULATOR AND BATTERY REMOVAL performed by Peg Berkowitz MD at [facility] N/A 8/9/2019    SPINAL CORD STIMULATOR INSERTION performed by Peg Berkowitz MD at [address]  2/28/13    c5, c6, c7    SPINAL FUSION  10/03/2017       Family History:  Family History   Problem Relation Age of Onset    Arthritis Mother     Cancer Mother     High Blood Pressure Mother     Stroke Mother     Heart Attack Maternal Grandmother     Heart Attack Maternal Grandfather     Early Death Father 25        [de-identified]    Diabetes Neg Hx     Heart Disease Neg Hx        Past GI History:  GERD    Allergies:  Contrast [iodides]; Shellfish allergy; Penicillin g; Ampicillin; and Vancomycin    Social History:   TOBACCO:   reports that she has never smoked. She has never used smokeless tobacco.  ETOH:   reports that she drinks alcohol. Review Of Systems  GENERAL: No fever, chills or weight loss. EYES:  No  blurred vision, double vision   CARDIOVASCULAR: No chest pain or palpitations. RESPIRATORY:  No dyspnea or cough. +SOB  GI:  No change in bowel habits, hematochezia, or melena. No abd pain, nausea, vomiting, hematemesis, dysphagia, or odynophagia. MUSCULOSKELETAL: No new painful or swollen joints or myalgias. :   No dysuria or hematuria. SKIN:  No rashes or jaundice. NEUROLOGIC:  No headaches or seizures, numbness or tingling of arms, or legs. PSYCH:  No anxiety or depression. ENDOCRINE:  No polyuria or polydipsia. BLOOD:  +anemia     PHYSICAL EXAM:  BP (!) 101/56   Pulse 89   Temp 97.6 °F (36.4 °C) (Oral)   Resp 20   Ht 5' 5\" (1.651 m)   Wt 220 lb 6.4 oz (100 kg)   SpO2 98%   BMI 36.68 kg/m²     General appearance: No apparent distress, appears stated age and cooperative.   HEENT:

## 2019-09-07 LAB
ALBUMIN SERPL-MCNC: 3.7 G/DL (ref 3.5–5.1)
ALP BLD-CCNC: 85 U/L (ref 38–126)
ALT SERPL-CCNC: 82 U/L (ref 11–66)
ANION GAP SERPL CALCULATED.3IONS-SCNC: 12 MEQ/L (ref 8–16)
AST SERPL-CCNC: 87 U/L (ref 5–40)
BILIRUB SERPL-MCNC: 2.9 MG/DL (ref 0.3–1.2)
BUN BLDV-MCNC: 13 MG/DL (ref 7–22)
CALCIUM SERPL-MCNC: 8.8 MG/DL (ref 8.5–10.5)
CHLORIDE BLD-SCNC: 101 MEQ/L (ref 98–111)
CO2: 23 MEQ/L (ref 23–33)
CREAT SERPL-MCNC: 0.8 MG/DL (ref 0.4–1.2)
GFR SERPL CREATININE-BSD FRML MDRD: 75 ML/MIN/1.73M2
GLUCOSE BLD-MCNC: 157 MG/DL (ref 70–108)
HCT VFR BLD CALC: 22.5 % (ref 37–47)
HEMOGLOBIN: 6.7 GM/DL (ref 12–16)
HIV-2 AB: NEGATIVE
POTASSIUM SERPL-SCNC: 4.2 MEQ/L (ref 3.5–5.2)
SODIUM BLD-SCNC: 136 MEQ/L (ref 135–145)
TOTAL PROTEIN: 7.3 G/DL (ref 6.1–8)

## 2019-09-07 PROCEDURE — 6370000000 HC RX 637 (ALT 250 FOR IP): Performed by: FAMILY MEDICINE

## 2019-09-07 PROCEDURE — 85014 HEMATOCRIT: CPT

## 2019-09-07 PROCEDURE — 2709999900 HC NON-CHARGEABLE SUPPLY

## 2019-09-07 PROCEDURE — 80053 COMPREHEN METABOLIC PANEL: CPT

## 2019-09-07 PROCEDURE — 85018 HEMOGLOBIN: CPT

## 2019-09-07 PROCEDURE — 36415 COLL VENOUS BLD VENIPUNCTURE: CPT

## 2019-09-07 PROCEDURE — 1200000000 HC SEMI PRIVATE

## 2019-09-07 PROCEDURE — 99232 SBSQ HOSP IP/OBS MODERATE 35: CPT | Performed by: FAMILY MEDICINE

## 2019-09-07 PROCEDURE — 2580000003 HC RX 258: Performed by: INTERNAL MEDICINE

## 2019-09-07 PROCEDURE — 2580000003 HC RX 258: Performed by: FAMILY MEDICINE

## 2019-09-07 PROCEDURE — 6370000000 HC RX 637 (ALT 250 FOR IP): Performed by: INTERNAL MEDICINE

## 2019-09-07 RX ORDER — 0.9 % SODIUM CHLORIDE 0.9 %
250 INTRAVENOUS SOLUTION INTRAVENOUS ONCE
Status: COMPLETED | OUTPATIENT
Start: 2019-09-07 | End: 2019-09-07

## 2019-09-07 RX ORDER — PREDNISONE 20 MG/1
60 TABLET ORAL 2 TIMES DAILY
Status: DISCONTINUED | OUTPATIENT
Start: 2019-09-07 | End: 2019-09-08 | Stop reason: HOSPADM

## 2019-09-07 RX ORDER — BUTALBITAL, ACETAMINOPHEN AND CAFFEINE 50; 325; 40 MG/1; MG/1; MG/1
1 TABLET ORAL EVERY 4 HOURS PRN
Status: DISCONTINUED | OUTPATIENT
Start: 2019-09-07 | End: 2019-09-08 | Stop reason: HOSPADM

## 2019-09-07 RX ADMIN — SODIUM CHLORIDE, PRESERVATIVE FREE 10 ML: 5 INJECTION INTRAVENOUS at 20:08

## 2019-09-07 RX ADMIN — SODIUM CHLORIDE: 9 INJECTION, SOLUTION INTRAVENOUS at 20:05

## 2019-09-07 RX ADMIN — NYSTATIN 500000 UNITS: 100000 SUSPENSION ORAL at 20:05

## 2019-09-07 RX ADMIN — PANTOPRAZOLE SODIUM 40 MG: 40 TABLET, DELAYED RELEASE ORAL at 06:10

## 2019-09-07 RX ADMIN — PREDNISONE 60 MG: 20 TABLET ORAL at 13:39

## 2019-09-07 RX ADMIN — ACETAMINOPHEN 650 MG: 325 TABLET ORAL at 08:34

## 2019-09-07 RX ADMIN — BUPROPION HYDROCHLORIDE 150 MG: 150 TABLET, EXTENDED RELEASE ORAL at 08:34

## 2019-09-07 RX ADMIN — SODIUM CHLORIDE: 9 INJECTION, SOLUTION INTRAVENOUS at 10:47

## 2019-09-07 RX ADMIN — BUTALBITAL, ACETAMINOPHEN, AND CAFFEINE 1 TABLET: 50; 325; 40 TABLET ORAL at 15:55

## 2019-09-07 RX ADMIN — SODIUM CHLORIDE 250 ML: 9 INJECTION, SOLUTION INTRAVENOUS at 21:54

## 2019-09-07 RX ADMIN — NYSTATIN 500000 UNITS: 100000 SUSPENSION ORAL at 17:15

## 2019-09-07 RX ADMIN — PREDNISONE 60 MG: 20 TABLET ORAL at 20:05

## 2019-09-07 RX ADMIN — ACETAMINOPHEN 650 MG: 325 TABLET ORAL at 17:16

## 2019-09-07 RX ADMIN — BUPROPION HYDROCHLORIDE 150 MG: 150 TABLET, EXTENDED RELEASE ORAL at 20:05

## 2019-09-07 ASSESSMENT — PAIN DESCRIPTION - DESCRIPTORS: DESCRIPTORS: ACHING

## 2019-09-07 ASSESSMENT — PAIN DESCRIPTION - LOCATION: LOCATION: GENERALIZED

## 2019-09-07 ASSESSMENT — PAIN SCALES - GENERAL
PAINLEVEL_OUTOF10: 0
PAINLEVEL_OUTOF10: 5
PAINLEVEL_OUTOF10: 0
PAINLEVEL_OUTOF10: 0
PAINLEVEL_OUTOF10: 5
PAINLEVEL_OUTOF10: 0
PAINLEVEL_OUTOF10: 0
PAINLEVEL_OUTOF10: 5

## 2019-09-07 ASSESSMENT — PAIN DESCRIPTION - PROGRESSION: CLINICAL_PROGRESSION: NOT CHANGED

## 2019-09-07 ASSESSMENT — PAIN DESCRIPTION - PAIN TYPE: TYPE: ACUTE PAIN

## 2019-09-07 ASSESSMENT — PAIN DESCRIPTION - ONSET: ONSET: ON-GOING

## 2019-09-07 ASSESSMENT — PAIN - FUNCTIONAL ASSESSMENT: PAIN_FUNCTIONAL_ASSESSMENT: PREVENTS OR INTERFERES SOME ACTIVE ACTIVITIES AND ADLS

## 2019-09-07 ASSESSMENT — PAIN DESCRIPTION - FREQUENCY: FREQUENCY: CONTINUOUS

## 2019-09-07 NOTE — PROGRESS NOTES
use.  Cardiovascular: Regular rate and rhythm with normal S1/S2 without murmurs, rubs or gallops. Abdomen: Soft, non-tender, non-distended with normal bowel sounds. No rebound or guarding  Musculoskeletal: No clubbing, cyanosis or edema bilaterally. Full range of motion without deformity. No calf tenderness palpation  Skin: Skin color, texture, turgor normal.  No rashes or lesions. Scars on back without fullness, erythema, or TTP  Neurologic:  Neurovascularly intact without any focal sensory/motor deficits. Cranial nerves: II-XII intact, grossly non-focal.  Psychiatric: Alert and oriented, thought content appropriate, normal insight  Capillary Refill: Brisk,< 3 seconds   Peripheral Pulses: +2 palpable, equal bilaterally       Labs:   Recent Labs     09/05/19  1145  09/06/19  0656 09/06/19  1251 09/06/19  1833   WBC 6.0  --  5.3  --   --    HGB 6.9*   < > 7.0* 7.4* 7.7*   HCT 22.2*   < > 22.3* 23.8* 25.1*     --  327  --   --     < > = values in this interval not displayed. Recent Labs     09/05/19  1145 09/06/19  0656 09/07/19  0758    137 136   K 4.5 4.2 4.2   CL 99 102 101   CO2 23 21* 23   BUN 20 17 13   CREATININE 0.9 0.9 0.8   CALCIUM 9.0 8.8 8.8     Recent Labs     09/05/19  1145 09/06/19  1251 09/07/19  0758   AST 74* 106* 87*   ALT 71* 88* 82*   BILIDIR 0.7* 0.9*  --    BILITOT 2.0* 2.5* 2.9*   ALKPHOS 82 83 85     Recent Labs     09/05/19  1145 09/06/19  1251   INR 1.13 1.03     No results for input(s): Yuli Clarence in the last 72 hours. Urinalysis:      Lab Results   Component Value Date    NITRU NEGATIVE 09/05/2019    WBCUA 0-2 08/23/2019    BACTERIA NONE 08/23/2019    RBCUA 0-2 08/23/2019    BLOODU NEGATIVE 09/05/2019    SPECGRAV 1.015 08/23/2019    GLUCOSEU NEGATIVE 09/05/2019       Radiology:  US LIVER   Final Result   Diffusely increased liver echogenicity compatible with hepatic steatosis. **This report has been created using voice recognition software.   It may contain minor errors which are inherent in voice recognition technology. **      Final report electronically signed by Dr. Susie Raman on 9/6/2019 3:57 PM      NM LUNG VENT/PERFUSION (VQ)   Final Result   1. Very low probability for pulmonary embolism. Final report electronically signed by Dr. Terri Perez on 9/5/2019 3:11 PM      XR CHEST STANDARD (2 VW)   Final Result   Stable radiographic appearance of the chest. No evidence of an acute process. **This report has been created using voice recognition software. It may contain minor errors which are inherent in voice recognition technology. **      Final report electronically signed by Dr. Susie Raman on 9/5/2019 2:34 PM          Diet: DIET LOW FAT;     Samuels: No    Microbiology:  HIV 9/5 (-)    Mono 9/5 (-)    Influenza 9/5 (-)    Blood cx 9/5 (-)    Tele:  no    DVT prophylaxis: [] Lovenox                                 [x] SCDs                                 [] SQ Heparin                                 [] Encourage ambulation           [] Already on Anticoagulation     Disposition:    [x] Home       [] TCU       [] Rehab       [] Psych       [] SNF       [] [facility]       [] Other-    Code Status: Full Code    PT/OT Eval Status: not needed          Electronically signed by Ghulam Villalba MD on 9/7/2019 at 8:38 AM when pt evaluated - final note filed late

## 2019-09-07 NOTE — CONSULTS
[facility]    Patient - Oli Pinto   MRN -  [id number]   [facility] [[facility]   - 1965      Date of Admission -  2019 10:49 AM  Date of evaluation -  2019  Room - [facility]12/012-A   Hospital Day - 2  Consulting - Pj Sawyer MD Primary Care Physician - SILVERIO Mortensen - CNP       Reason for Consult       ANEMIA    ActiveHospital Problem List      Active Hospital Problems    Diagnosis Date Noted    Obstructive sleep apnea on CPAP [G47.33, Z99.89] 2015     Priority: High    Symptomatic anemia [D64.9] 2019    Shortness of breath [R06.02] 2019    Chronic pain syndrome [G89.4] 2019    Depression [F32.9] 2013    Hypertension [I10] 2013    Weakness [R53.1] 2013    GERD (gastroesophageal reflux disease) [K21.9] 2011     HPI   Oli Pinto is a 49 y.o. female admitted on 2019 for shortness of breath. She states symptoms started 4 days ago. Patient states that she has had a increasing shortness of breath and weakness. Patient states that she does not have blood in her stools. She denies dark stools. Patient states that she no longer has menses. She states that she has had a hysterectomy with only the ovaries remaining. Patient denies chest pain. Denies vomiting, diarrhea or constipation. She endorses nausea.     Patient  Has had  isolated hypotension with a systolic in the 86S in ED, responded to IV fluid hydration. Patient is afebrile with a temperature of 98.4 °F. CBC notes a WBC of 6000, hemoglobin 6.9, hematocrit 22.2, and platelets 967,124. Glucose is 163. BMP essentially normal.  proBNP 23. Troponin nonelevated. LFTs ALT 71, AST 74, bilirubin 2.0. Lipase is 114.1. Patient was be transfused 1 packed red blood cells overnight. She has history of chronic back pain due to degenerative joint disease requiring multiple operations she is diabetic and obese.  In 2019 spinal cord LOBECTOMY, THYROID  5/21/10    right lobectomy    NASAL SINUS SURGERY  11/2017    Dr. Salguero Books in [address] [place] [address] N/A 2/22/2019    T7-8 SPINAL CORD STIMULATOR IMPLANT performed by Susy Cortes MD at [facility] N/A 3/21/2019    THORACIC I&D WITH SPINAL STIMULATOR AND BATTERY REMOVAL performed by Susy Cortes MD at [facility] N/A 8/9/2019    SPINAL CORD STIMULATOR INSERTION performed by Susy Cortes MD at [address]  2/28/13    c5, c6, c7    SPINAL FUSION  10/03/2017    UPPER GASTROINTESTINAL ENDOSCOPY N/A 9/6/2019    EGD BIOPSY performed by Hilary Sandhu MD at [facility] [facility] FAT; Allergies   Contrast [iodides]; Shellfish allergy; Penicillin g; Ampicillin; and Vancomycin  Social History     Social History     Socioeconomic History    Marital status:      Spouse name: Araceli Malcolm Number of children: 4    Years of education: Not on file    Highest education level: Not on file   Occupational History    Occupation: unemployed at present time   [address] [place] resource strain: Not on file    Food insecurity:     Worry: Not on file     Inability: Not on file   Heyo needs:     Medical: Not on file     Non-medical: Not on file   Tobacco Use    Smoking status: Never Smoker    Smokeless tobacco: Never Used   Substance and Sexual Activity    Alcohol use:  Yes     Alcohol/week: 0.0 standard drinks     Comment: very rare    Drug use: Yes     Types: Marijuana     Comment: Medical Marijuana    Sexual activity: Yes     Partners: Male   Lifestyle    Physical activity:     Days per week: Not on file     Minutes per session: Not on file    Stress: Not on file   Relationships    Social connections:     Talks on phone: Not on file     Gets together: Not on file     Attends Congregational service: Not on file     Active member of club or organization: Not on file     Attends meetings of clubs or organizations: Not on file     Relationship status: Not on file    Intimate partner violence:     Fear of current or ex partner: Not on file     Emotionally abused: Not on file     Physically abused: Not on file     Forced sexual activity: Not on file   Other Topics Concern    Not on file   Social History Narrative    Not on file     Family History          Problem Relation Age of Onset    Arthritis Mother     Cancer Mother     High Blood Pressure Mother     Stroke Mother     Heart Attack Maternal Grandmother     Heart Attack Maternal Grandfather     Early Death Father 25        [de-identified]    Diabetes Neg Hx     Heart Disease Neg Hx      ROS     Review of Systems   Constitutional: Positive for activity change, appetite change and fatigue. Negative for fever. HENT: Negative for congestion, dental problem, facial swelling, hearing loss, mouth sores, nosebleeds, sore throat, tinnitus and trouble swallowing. Eyes: Negative for discharge and visual disturbance. Respiratory: Negative for cough, chest tightness, shortness of breath and wheezing. Cardiovascular: Negative for chest pain, palpitations and leg swelling. Gastrointestinal: Positive for abdominal pain. Negative for abdominal distention, blood in stool, constipation, diarrhea, nausea, rectal pain and vomiting. Endocrine: Negative for cold intolerance, polydipsia and polyuria. Genitourinary: Negative for decreased urine volume, difficulty urinating, dysuria, flank pain, hematuria and urgency. Musculoskeletal: Negative for arthralgias, back pain, gait problem, joint swelling, myalgias and neck stiffness. Skin: Negative for color change, rash and wound. Neurological: Positive for dizziness and weakness. Negative for tremors, seizures, speech difficulty, light-headedness, numbness and headaches. Hematological: Negative for adenopathy. Does not bruise/bleed easily. Psychiatric/Behavioral: Positive for dysphoric mood. Negative for confusion and sleep disturbance. The patient is not nervous/anxious. Vitals     height is 5' 5\" (1.651 m) and weight is 220 lb 6.4 oz (100 kg). Her oral temperature is 98.3 °F (36.8 °C). Her blood pressure is 102/53 (abnormal) and her pulse is 94. Her respiration is 18 and oxygen saturation is 98%. Exam   Physical Exam   Constitutional: She is oriented to person, place, and time. She appears well-developed and well-nourished. No distress. HENT:   Head: Normocephalic. Mouth/Throat: Oropharynx is clear and moist. No oropharyngeal exudate. Eyes: Pupils are equal, round, and reactive to light. EOM are normal. Right eye exhibits no discharge. Left eye exhibits no discharge. No scleral icterus. Neck: Normal range of motion. Neck supple. No JVD present. No tracheal deviation present. No thyromegaly present. Cardiovascular: Normal rate and normal heart sounds. Exam reveals no gallop and no friction rub. No murmur heard. Pulmonary/Chest: Effort normal and breath sounds normal. No stridor. No respiratory distress. She has no wheezes. She has no rales. She exhibits no tenderness. Abdominal: Soft. Bowel sounds are normal. She exhibits no distension and no mass. There is no tenderness. There is no rebound. Musculoskeletal: Normal range of motion. She exhibits no edema. Good range of motion in all four extremities. Lymphadenopathy:     She has no cervical adenopathy. Neurological: She is alert and oriented to person, place, and time. She has normal reflexes. No cranial nerve deficit. She exhibits normal muscle tone. Skin: Skin is warm. No rash noted. No erythema. Psychiatric: She has a normal mood and affect. Her behavior is normal. Judgment and thought content normal.   Vitals reviewed.           Labs   CBC  Recent Labs     09/05/19  1145  09/06/19  0656 09/06/19  1251 09/06/19  1833   WBC 6.0  --  5.3  --   --    RBC

## 2019-09-07 NOTE — OP NOTE
esophagus. Four-quadrant biopsies were  done at 38 cm. Endoscope was removed and the procedure was terminated. The patient tolerated the procedure well, taken to GI lab recovery area  in stable condition. IMPRESSION:  1. Distal esophagitis. 2.  Gastritis. 3.  Normal duodenum. 4.  Pictures are pending. PLAN:  1. The patient is on omeprazole as an outpatient. Use Protonix b.i.d.  here. He will resume diet. 2.  No active bleeding. No signs of bleeding or reasons for anemia. The patient has not noted blood per rectum and can have colonoscopy as  an outpatient. 3.  Can follow up in the office in three to four weeks with [facility]  per Dr. Zeny Montoya and plan colonoscopy at a later date. SAADIA Dudley M.D.    D: 09/06/2019 11:18:34       T: 09/06/2019 12:51:12     [email or web address]  Job#: [id number]     Doc#: [id number]    CC: Loras Rosser M.D. Evangeline Maker M.D. Carvin Bence, APRN-ASHIA Roy MD

## 2019-09-08 VITALS
DIASTOLIC BLOOD PRESSURE: 57 MMHG | WEIGHT: 220.4 LBS | BODY MASS INDEX: 36.72 KG/M2 | OXYGEN SATURATION: 99 % | HEIGHT: 65 IN | HEART RATE: 92 BPM | SYSTOLIC BLOOD PRESSURE: 114 MMHG | TEMPERATURE: 98.5 F | RESPIRATION RATE: 18 BRPM

## 2019-09-08 LAB
ALBUMIN SERPL-MCNC: 3.8 G/DL (ref 3.5–5.1)
ALP BLD-CCNC: 85 U/L (ref 38–126)
ALT SERPL-CCNC: 83 U/L (ref 11–66)
ANISOCYTOSIS: PRESENT
AST SERPL-CCNC: 69 U/L (ref 5–40)
BASOPHILS # BLD: 0.2 %
BASOPHILS ABSOLUTE: 0 THOU/MM3 (ref 0–0.1)
BILIRUB SERPL-MCNC: 3 MG/DL (ref 0.3–1.2)
BILIRUBIN DIRECT: 1.1 MG/DL (ref 0–0.3)
EKG ATRIAL RATE: 85 BPM
EKG P AXIS: 32 DEGREES
EKG P-R INTERVAL: 152 MS
EKG Q-T INTERVAL: 372 MS
EKG QRS DURATION: 74 MS
EKG QTC CALCULATION (BAZETT): 442 MS
EKG R AXIS: 29 DEGREES
EKG T AXIS: 35 DEGREES
EKG VENTRICULAR RATE: 85 BPM
EOSINOPHIL # BLD: 0 %
EOSINOPHILS ABSOLUTE: 0 THOU/MM3 (ref 0–0.4)
ERYTHROCYTE [DISTWIDTH] IN BLOOD BY AUTOMATED COUNT: 21.1 % (ref 11.5–14.5)
ERYTHROCYTE [DISTWIDTH] IN BLOOD BY AUTOMATED COUNT: 66.4 FL (ref 35–45)
HCT VFR BLD CALC: 23.5 % (ref 37–47)
HCT VFR BLD CALC: 24.2 % (ref 37–47)
HEMOGLOBIN: 7.3 GM/DL (ref 12–16)
HEMOGLOBIN: 7.6 GM/DL (ref 12–16)
IMMATURE GRANS (ABS): 0.08 THOU/MM3 (ref 0–0.07)
IMMATURE GRANULOCYTES: 1 %
LD: 412 U/L (ref 100–190)
LYMPHOCYTES # BLD: 33.2 %
LYMPHOCYTES ABSOLUTE: 1.9 THOU/MM3 (ref 1–4.8)
MCH RBC QN AUTO: 29.2 PG (ref 26–33)
MCHC RBC AUTO-ENTMCNC: 31.4 GM/DL (ref 32.2–35.5)
MCV RBC AUTO: 93.1 FL (ref 81–99)
MONOCYTES # BLD: 2.6 %
MONOCYTES ABSOLUTE: 0.1 THOU/MM3 (ref 0.4–1.3)
NUCLEATED RED BLOOD CELLS: 0 /100 WBC
PLATELET # BLD: 344 THOU/MM3 (ref 130–400)
PMV BLD AUTO: 10.3 FL (ref 9.4–12.4)
RBC # BLD: 2.6 MILL/MM3 (ref 4.2–5.4)
SEG NEUTROPHILS: 62.6 %
SEGMENTED NEUTROPHILS ABSOLUTE COUNT: 3.6 THOU/MM3 (ref 1.8–7.7)
TOTAL PROTEIN: 7.6 G/DL (ref 6.1–8)
WBC # BLD: 5.7 THOU/MM3 (ref 4.8–10.8)

## 2019-09-08 PROCEDURE — 80076 HEPATIC FUNCTION PANEL: CPT

## 2019-09-08 PROCEDURE — 85025 COMPLETE CBC W/AUTO DIFF WBC: CPT

## 2019-09-08 PROCEDURE — 85014 HEMATOCRIT: CPT

## 2019-09-08 PROCEDURE — 6370000000 HC RX 637 (ALT 250 FOR IP): Performed by: FAMILY MEDICINE

## 2019-09-08 PROCEDURE — 93010 ELECTROCARDIOGRAM REPORT: CPT | Performed by: INTERNAL MEDICINE

## 2019-09-08 PROCEDURE — 6370000000 HC RX 637 (ALT 250 FOR IP): Performed by: INTERNAL MEDICINE

## 2019-09-08 PROCEDURE — 99239 HOSP IP/OBS DSCHRG MGMT >30: CPT | Performed by: FAMILY MEDICINE

## 2019-09-08 PROCEDURE — 83615 LACTATE (LD) (LDH) ENZYME: CPT

## 2019-09-08 PROCEDURE — 36415 COLL VENOUS BLD VENIPUNCTURE: CPT

## 2019-09-08 PROCEDURE — 85018 HEMOGLOBIN: CPT

## 2019-09-08 PROCEDURE — 2709999900 HC NON-CHARGEABLE SUPPLY

## 2019-09-08 PROCEDURE — 2580000003 HC RX 258: Performed by: INTERNAL MEDICINE

## 2019-09-08 RX ORDER — SPIRONOLACTONE AND HYDROCHLOROTHIAZIDE 25; 25 MG/1; MG/1
TABLET ORAL
Qty: 90 TABLET | Refills: 1
Start: 2019-09-08 | End: 2020-02-17

## 2019-09-08 RX ORDER — PREDNISONE 20 MG/1
60 TABLET ORAL 2 TIMES DAILY
Qty: 24 TABLET | Refills: 0 | Status: SHIPPED | OUTPATIENT
Start: 2019-09-08 | End: 2019-09-11 | Stop reason: SDUPTHER

## 2019-09-08 RX ORDER — OMEPRAZOLE 40 MG/1
40 CAPSULE, DELAYED RELEASE ORAL 2 TIMES DAILY
Qty: 60 CAPSULE | Refills: 0 | Status: SHIPPED | OUTPATIENT
Start: 2019-09-08 | End: 2019-10-28 | Stop reason: SDUPTHER

## 2019-09-08 RX ADMIN — METOPROLOL SUCCINATE 25 MG: 25 TABLET, EXTENDED RELEASE ORAL at 08:52

## 2019-09-08 RX ADMIN — NYSTATIN 500000 UNITS: 100000 SUSPENSION ORAL at 08:52

## 2019-09-08 RX ADMIN — BUPROPION HYDROCHLORIDE 150 MG: 150 TABLET, EXTENDED RELEASE ORAL at 08:52

## 2019-09-08 RX ADMIN — SODIUM CHLORIDE: 9 INJECTION, SOLUTION INTRAVENOUS at 10:18

## 2019-09-08 RX ADMIN — PREDNISONE 60 MG: 20 TABLET ORAL at 08:52

## 2019-09-08 RX ADMIN — NYSTATIN 500000 UNITS: 100000 SUSPENSION ORAL at 13:56

## 2019-09-08 RX ADMIN — NYSTATIN 500000 UNITS: 100000 SUSPENSION ORAL at 16:48

## 2019-09-08 RX ADMIN — PANTOPRAZOLE SODIUM 40 MG: 40 TABLET, DELAYED RELEASE ORAL at 06:33

## 2019-09-08 ASSESSMENT — PAIN SCALES - GENERAL: PAINLEVEL_OUTOF10: 0

## 2019-09-08 NOTE — DISCHARGE SUMMARY
7.4 (L) 12.0 - 16.0 gm/dl    Hematocrit 23.8 (L) 37.0 - 47.0 %   Lactate dehydrogenase    Collection Time: 09/06/19 12:51 PM   Result Value Ref Range     (H) 100 - 190 U/L   Fibrinogen    Collection Time: 09/06/19 12:51 PM   Result Value Ref Range    Fibrinogen 180 155 - 475 mg/100ml   Protime-INR    Collection Time: 09/06/19 12:51 PM   Result Value Ref Range    INR 1.03 0.85 - 1.13   Hepatic Function Panel    Collection Time: 09/06/19 12:51 PM   Result Value Ref Range    Alb 3.9 3.5 - 5.1 g/dL    Total Bilirubin 2.5 (H) 0.3 - 1.2 mg/dL    Bilirubin, Direct 0.9 (H) 0.0 - 0.3 mg/dL    Alkaline Phosphatase 83 38 - 126 U/L     (H) 5 - 40 U/L    ALT 88 (H) 11 - 66 U/L    Total Protein 7.7 6.1 - 8.0 g/dL   Reticulocytes    Collection Time: 09/06/19 12:51 PM   Result Value Ref Range    Retic Ct Abs 5.9 (H) 0.5 - 2.0 %    Absolute Retic # 158.0 (H) 20.0 - 115.0 thou/mm3    Immature Retic Fract 39.3 (H) 3.0 - 15.9 %    Retic Hemoglobin 36.2 (H) 28.2 - 35.7 pg   IRON SATURATION    Collection Time: 09/06/19 12:51 PM   Result Value Ref Range    Iron Saturation 48 20 - 50 %   Iron binding capacity    Collection Time: 09/06/19 12:51 PM   Result Value Ref Range    TIBC 318 171 - 450 ug/dL   Iron    Collection Time: 09/06/19 12:51 PM   Result Value Ref Range    Iron 152 50 - 170 ug/dL   Hemoglobin and Hematocrit, Blood    Collection Time: 09/06/19  6:33 PM   Result Value Ref Range    Hemoglobin 7.7 (L) 12.0 - 16.0 gm/dl    Hematocrit 25.1 (L) 37.0 - 47.0 %   DIRECT ANTIGLOBULIN TEST    Collection Time: 09/06/19  6:33 PM   Result Value Ref Range    Direct Mar POS, 1+    DEVON IGG TUBE    Collection Time: 09/06/19  6:33 PM   Result Value Ref Range    DEVON IgG NEG    Comprehensive metabolic panel    Collection Time: 09/07/19  7:58 AM   Result Value Ref Range    Glucose 157 (H) 70 - 108 mg/dL    CREATININE 0.8 0.4 - 1.2 mg/dL    BUN 13 7 - 22 mg/dL    Sodium 136 135 - 145 meq/L    Potassium 4.2 3.5 - 5.2 meq/L    Chloride 0.1 thou/mm3    Immature Grans (Abs) 0.08 (H) 0.00 - 0.07 thou/mm3    nRBC 0 /100 wbc    Anisocytosis PRESENT Absent   Hemoglobin and Hematocrit, Blood    Collection Time: 09/08/19  3:55 PM   Result Value Ref Range    Hemoglobin 7.3 (L) 12.0 - 16.0 gm/dl    Hematocrit 23.5 (L) 37.0 - 47.0 %       Discharge condition: stable  Disposition: Home  Time spent on discharge: 45 min    Electronically signed by Mirtha Beckford MD on 9/8/2019 at 5:31 PM

## 2019-09-08 NOTE — PLAN OF CARE
Problem: SKIN INTEGRITY  Goal: Skin integrity is maintained or improved  Outcome: Met This Shift     Problem: Activity:  Goal: Ability to tolerate increased activity will improve  Description  Ability to tolerate increased activity will improve  Outcome: Met This Shift     Problem: Falls - Risk of:  Goal: Will remain free from falls  Description  Will remain free from falls  9/8/2019 1636 by Lenin Hull RN  Outcome: Ongoing  Note:   Fall sign posted. Arm band in place. Non-skid slippers on. Bed alarm on. Patient agreeable to use of call light. Call light within reach. Bed in lowest position. 9/8/2019 1634 by Lenin Hull RN  Outcome: Ongoing  Note:   Fall sign posted. Arm band in place. Non-skid slippers on. Bed alarm on. Patient agreeable to use of call light. Call light within reach. Bed in lowest position. Problem: DAILY CARE  Goal: Daily care needs are met  9/8/2019 1636 by Lenin Hull RN  Outcome: Ongoing  Note:   Assisted patient with shower today. All daily care needs met. Will continue to monitor. 9/8/2019 1634 by Lenin Hull RN  Outcome: Ongoing  Note:   Assisted patient with shower today. All daily care needs met. Problem: PAIN  Goal: Patient's pain/discomfort is manageable  9/8/2019 1636 by Lenin Hull RN  Outcome: Ongoing  Note:   Patient denies pain throughout shift. Stated pain goal of \"5\" met. Will continue to assess pain hourly. 9/8/2019 1634 by Lenin Hull RN  Outcome: Ongoing  Note:   Patient denies pain throughout shift. Patients stated pain goal of      Problem: DISCHARGE BARRIERS  Goal: Patient's continuum of care needs are met  Outcome: Ongoing  Note:   Patient plans to be discharged to home with . Care plan reviewed with patient and . Patient and  verbalize understanding of the plan of care and contribute to goal setting.

## 2019-09-09 LAB — HAPTOGLOBIN: < 10 MG/DL (ref 30–200)

## 2019-09-10 DIAGNOSIS — D64.9 ANEMIA, UNSPECIFIED TYPE: Primary | ICD-10-CM

## 2019-09-11 ENCOUNTER — HOSPITAL ENCOUNTER (OUTPATIENT)
Dept: INFUSION THERAPY | Age: 54
Discharge: HOME | End: 2019-09-11
Payer: COMMERCIAL

## 2019-09-11 ENCOUNTER — OFFICE VISIT (OUTPATIENT)
Dept: ONCOLOGY | Age: 54
End: 2019-09-11
Payer: COMMERCIAL

## 2019-09-11 VITALS
WEIGHT: 219.8 LBS | SYSTOLIC BLOOD PRESSURE: 120 MMHG | BODY MASS INDEX: 36.62 KG/M2 | DIASTOLIC BLOOD PRESSURE: 78 MMHG | HEART RATE: 80 BPM | HEIGHT: 65 IN | OXYGEN SATURATION: 100 % | TEMPERATURE: 97.9 F | RESPIRATION RATE: 14 BRPM

## 2019-09-11 DIAGNOSIS — E80.6 HYPERBILIRUBINEMIA: ICD-10-CM

## 2019-09-11 DIAGNOSIS — D64.9 ANEMIA, UNSPECIFIED TYPE: ICD-10-CM

## 2019-09-11 DIAGNOSIS — D59.10 AUTOIMMUNE HEMOLYTIC ANEMIA (HCC): Primary | ICD-10-CM

## 2019-09-11 DIAGNOSIS — R06.02 SHORTNESS OF BREATH: ICD-10-CM

## 2019-09-11 DIAGNOSIS — K20.90 ESOPHAGITIS: ICD-10-CM

## 2019-09-11 LAB
ALBUMIN SERPL-MCNC: 4.1 G/DL (ref 3.5–5.1)
ALP BLD-CCNC: 112 U/L (ref 38–126)
ALT SERPL-CCNC: 86 U/L (ref 11–66)
ANISOCYTOSIS: PRESENT
AST SERPL-CCNC: 52 U/L (ref 5–40)
ATYPICAL LYMPHOCYTES: ABNORMAL %
BASOPHILIA: ABNORMAL
BASOPHILS # BLD: 0 %
BASOPHILS ABSOLUTE: 0 THOU/MM3 (ref 0–0.1)
BILIRUB SERPL-MCNC: 1.3 MG/DL (ref 0.3–1.2)
BILIRUBIN DIRECT: 0.5 MG/DL (ref 0–0.3)
BLOOD CULTURE, ROUTINE: NORMAL
BLOOD CULTURE, ROUTINE: NORMAL
EOSINOPHIL # BLD: 0 %
EOSINOPHILS ABSOLUTE: 0 THOU/MM3 (ref 0–0.4)
ERYTHROCYTE [DISTWIDTH] IN BLOOD BY AUTOMATED COUNT: 25.7 % (ref 11.5–14.5)
ERYTHROCYTE [DISTWIDTH] IN BLOOD BY AUTOMATED COUNT: 90.7 FL (ref 35–45)
HCT VFR BLD CALC: 28.5 % (ref 37–47)
HEMOGLOBIN: 8.4 GM/DL (ref 12–16)
IMMATURE GRANS (ABS): 0.12 THOU/MM3 (ref 0–0.07)
IMMATURE GRANULOCYTES: 2 %
LD: 306 U/L (ref 100–190)
LYMPHOCYTES # BLD: 17.2 %
LYMPHOCYTES ABSOLUTE: 1.3 THOU/MM3 (ref 1–4.8)
MCH RBC QN AUTO: 29.4 PG (ref 26–33)
MCHC RBC AUTO-ENTMCNC: 29.5 GM/DL (ref 32.2–35.5)
MCV RBC AUTO: 99.7 FL (ref 81–99)
MONOCYTES # BLD: 7.2 %
MONOCYTES ABSOLUTE: 0.6 THOU/MM3 (ref 0.4–1.3)
NUCLEATED RED BLOOD CELLS: 1 /100 WBC
PLATELET # BLD: 526 THOU/MM3 (ref 130–400)
PMV BLD AUTO: 11.8 FL (ref 9.4–12.4)
POIKILOCYTES: ABNORMAL
RBC # BLD: 2.86 MILL/MM3 (ref 4.2–5.4)
SCAN OF BLOOD SMEAR: NORMAL
SEG NEUTROPHILS: 74.1 %
SEGMENTED NEUTROPHILS ABSOLUTE COUNT: 5.8 THOU/MM3 (ref 1.8–7.7)
TOTAL PROTEIN: 7.7 G/DL (ref 6.1–8)
WBC # BLD: 7.8 THOU/MM3 (ref 4.8–10.8)

## 2019-09-11 PROCEDURE — 83615 LACTATE (LD) (LDH) ENZYME: CPT

## 2019-09-11 PROCEDURE — 99214 OFFICE O/P EST MOD 30 MIN: CPT | Performed by: INTERNAL MEDICINE

## 2019-09-11 PROCEDURE — 99211 OFF/OP EST MAY X REQ PHY/QHP: CPT

## 2019-09-11 PROCEDURE — 36415 COLL VENOUS BLD VENIPUNCTURE: CPT

## 2019-09-11 PROCEDURE — 80076 HEPATIC FUNCTION PANEL: CPT

## 2019-09-11 PROCEDURE — 85025 COMPLETE CBC W/AUTO DIFF WBC: CPT

## 2019-09-11 RX ORDER — PREDNISONE 20 MG/1
60 TABLET ORAL 2 TIMES DAILY
Qty: 126 TABLET | Refills: 0 | Status: SHIPPED | OUTPATIENT
Start: 2019-09-11 | End: 2019-10-10 | Stop reason: SDUPTHER

## 2019-09-16 ENCOUNTER — OFFICE VISIT (OUTPATIENT)
Dept: ONCOLOGY | Age: 54
End: 2019-09-16
Payer: COMMERCIAL

## 2019-09-16 ENCOUNTER — HOSPITAL ENCOUNTER (OUTPATIENT)
Dept: INFUSION THERAPY | Age: 54
Discharge: HOME | End: 2019-09-16
Payer: COMMERCIAL

## 2019-09-16 VITALS
TEMPERATURE: 98.4 F | RESPIRATION RATE: 16 BRPM | DIASTOLIC BLOOD PRESSURE: 74 MMHG | BODY MASS INDEX: 36.32 KG/M2 | HEART RATE: 74 BPM | WEIGHT: 218 LBS | SYSTOLIC BLOOD PRESSURE: 112 MMHG | OXYGEN SATURATION: 98 % | HEIGHT: 65 IN

## 2019-09-16 DIAGNOSIS — D59.10 AUTOIMMUNE HEMOLYTIC ANEMIA (HCC): Primary | ICD-10-CM

## 2019-09-16 DIAGNOSIS — D59.10 AUTOIMMUNE HEMOLYTIC ANEMIA (HCC): ICD-10-CM

## 2019-09-16 DIAGNOSIS — Z79.52 ON PREDNISONE THERAPY: ICD-10-CM

## 2019-09-16 LAB
BASINOPHIL, AUTOMATED: 0 % (ref 0–3)
EOSINOPHILS RELATIVE PERCENT: 1 % (ref 0–4)
HCT VFR BLD CALC: 28.4 % (ref 37–47)
HEMOGLOBIN: 9.5 GM/DL (ref 12–16)
LYMPHOCYTES # BLD: 16 % (ref 15–47)
MCH RBC QN AUTO: 31.3 PG (ref 27–31)
MCHC RBC AUTO-ENTMCNC: 33.3 GM/DL (ref 33–37)
MCV RBC AUTO: 94 FL (ref 81–99)
MONOCYTES: 5 % (ref 0–12)
PDW BLD-RTO: 22.6 % (ref 11.5–14.5)
PLATELET # BLD: 461 THOU/MM3 (ref 130–400)
PMV BLD AUTO: 6.8 FL (ref 7.4–10.4)
RBC # BLD: 3.03 MILL/MM3 (ref 4.2–5.4)
SEG NEUTROPHILS: 78 % (ref 43–75)
WBC # BLD: 7.8 THOU/MM3 (ref 4.8–10.8)

## 2019-09-16 PROCEDURE — 99213 OFFICE O/P EST LOW 20 MIN: CPT | Performed by: INTERNAL MEDICINE

## 2019-09-16 PROCEDURE — 99211 OFF/OP EST MAY X REQ PHY/QHP: CPT

## 2019-09-16 PROCEDURE — 85025 COMPLETE CBC W/AUTO DIFF WBC: CPT

## 2019-09-16 PROCEDURE — 36415 COLL VENOUS BLD VENIPUNCTURE: CPT

## 2019-09-18 ENCOUNTER — TELEPHONE (OUTPATIENT)
Dept: ONCOLOGY | Age: 54
End: 2019-09-18

## 2019-09-20 ENCOUNTER — HOSPITAL ENCOUNTER (OUTPATIENT)
Age: 54
Discharge: HOME | End: 2019-09-20
Payer: COMMERCIAL

## 2019-09-20 DIAGNOSIS — D59.10 AUTOIMMUNE HEMOLYTIC ANEMIA (HCC): ICD-10-CM

## 2019-09-20 LAB
ANISOCYTOSIS: PRESENT
BASOPHILS # BLD: 0.2 %
BASOPHILS ABSOLUTE: 0 THOU/MM3 (ref 0–0.1)
EOSINOPHIL # BLD: 0.3 %
EOSINOPHILS ABSOLUTE: 0 THOU/MM3 (ref 0–0.4)
ERYTHROCYTE [DISTWIDTH] IN BLOOD BY AUTOMATED COUNT: 23.5 % (ref 11.5–14.5)
ERYTHROCYTE [DISTWIDTH] IN BLOOD BY AUTOMATED COUNT: 86 FL (ref 35–45)
HCT VFR BLD CALC: 29.4 % (ref 37–47)
HEMOGLOBIN: 8.9 GM/DL (ref 12–16)
IMMATURE GRANS (ABS): 0.12 THOU/MM3 (ref 0–0.07)
IMMATURE GRANULOCYTES: 1 %
LD: 213 U/L (ref 100–190)
LYMPHOCYTES # BLD: 19.6 %
LYMPHOCYTES ABSOLUTE: 1.7 THOU/MM3 (ref 1–4.8)
MCH RBC QN AUTO: 30.9 PG (ref 26–33)
MCHC RBC AUTO-ENTMCNC: 30.3 GM/DL (ref 32.2–35.5)
MCV RBC AUTO: 102.1 FL (ref 81–99)
MONOCYTES # BLD: 10 %
MONOCYTES ABSOLUTE: 0.9 THOU/MM3 (ref 0.4–1.3)
NUCLEATED RED BLOOD CELLS: 0 /100 WBC
PLATELET # BLD: 353 THOU/MM3 (ref 130–400)
PMV BLD AUTO: 11.4 FL (ref 9.4–12.4)
RBC # BLD: 2.88 MILL/MM3 (ref 4.2–5.4)
SEG NEUTROPHILS: 68.6 %
SEGMENTED NEUTROPHILS ABSOLUTE COUNT: 6.1 THOU/MM3 (ref 1.8–7.7)
WBC # BLD: 8.9 THOU/MM3 (ref 4.8–10.8)

## 2019-09-20 PROCEDURE — 83615 LACTATE (LD) (LDH) ENZYME: CPT

## 2019-09-20 PROCEDURE — 36415 COLL VENOUS BLD VENIPUNCTURE: CPT

## 2019-09-20 PROCEDURE — 85025 COMPLETE CBC W/AUTO DIFF WBC: CPT

## 2019-09-22 DIAGNOSIS — D59.10 AUTOIMMUNE HEMOLYTIC ANEMIA (HCC): Primary | ICD-10-CM

## 2019-09-23 ENCOUNTER — TELEPHONE (OUTPATIENT)
Dept: ONCOLOGY | Age: 54
End: 2019-09-23

## 2019-09-23 ENCOUNTER — HOSPITAL ENCOUNTER (OUTPATIENT)
Age: 54
Discharge: HOME | End: 2019-09-23
Payer: COMMERCIAL

## 2019-09-23 DIAGNOSIS — D59.10 AUTOIMMUNE HEMOLYTIC ANEMIA (HCC): Primary | ICD-10-CM

## 2019-09-23 DIAGNOSIS — R73.9 HYPERGLYCEMIA: ICD-10-CM

## 2019-09-23 DIAGNOSIS — D59.10 AUTOIMMUNE HEMOLYTIC ANEMIA (HCC): ICD-10-CM

## 2019-09-23 LAB
ALBUMIN SERPL-MCNC: 4 G/DL (ref 3.5–5.1)
ALP BLD-CCNC: 106 U/L (ref 38–126)
ALT SERPL-CCNC: 102 U/L (ref 11–66)
ANISOCYTOSIS: PRESENT
AST SERPL-CCNC: 20 U/L (ref 5–40)
BASOPHILS # BLD: 0.2 %
BASOPHILS ABSOLUTE: 0 THOU/MM3 (ref 0–0.1)
BILIRUB SERPL-MCNC: 1.9 MG/DL (ref 0.3–1.2)
BILIRUBIN DIRECT: 0.6 MG/DL (ref 0–0.3)
BUN, WHOLE BLOOD: 23 MG/DL (ref 8–26)
CHLORIDE, WHOLE BLOOD: 96 MEQ/L (ref 98–109)
CREATININE, WHOLE BLOOD: 0.9 MG/DL (ref 0.5–1.2)
EOSINOPHIL # BLD: 0.2 %
EOSINOPHILS ABSOLUTE: 0 THOU/MM3 (ref 0–0.4)
ERYTHROCYTE [DISTWIDTH] IN BLOOD BY AUTOMATED COUNT: 23.6 % (ref 11.5–14.5)
ERYTHROCYTE [DISTWIDTH] IN BLOOD BY AUTOMATED COUNT: 85.7 FL (ref 35–45)
GFR, ESTIMATED: 70 ML/MIN/1.73M2
GLUCOSE, WHOLE BLOOD: 407 MG/DL (ref 70–108)
HCT VFR BLD CALC: 29.8 % (ref 37–47)
HEMOGLOBIN: 9.4 GM/DL (ref 12–16)
IMMATURE GRANS (ABS): 0.13 THOU/MM3 (ref 0–0.07)
IMMATURE GRANULOCYTES: 1 %
IONIZED CALCIUM, WHOLE BLOOD: 1.14 MMOL/L (ref 1.12–1.32)
LD: 223 U/L (ref 100–190)
LYMPHOCYTES # BLD: 16.1 %
LYMPHOCYTES ABSOLUTE: 1.7 THOU/MM3 (ref 1–4.8)
MCH RBC QN AUTO: 32.1 PG (ref 26–33)
MCHC RBC AUTO-ENTMCNC: 31.5 GM/DL (ref 32.2–35.5)
MCV RBC AUTO: 101.7 FL (ref 81–99)
MONOCYTES # BLD: 8.8 %
MONOCYTES ABSOLUTE: 1 THOU/MM3 (ref 0.4–1.3)
NUCLEATED RED BLOOD CELLS: 1 /100 WBC
PLATELET # BLD: 328 THOU/MM3 (ref 130–400)
PLATELET ESTIMATE: ADEQUATE
PMV BLD AUTO: 11.4 FL (ref 9.4–12.4)
POTASSIUM, WHOLE BLOOD: 4.3 MEQ/L (ref 3.5–4.9)
RBC # BLD: 2.93 MILL/MM3 (ref 4.2–5.4)
SCAN OF BLOOD SMEAR: NORMAL
SEG NEUTROPHILS: 73.5 %
SEGMENTED NEUTROPHILS ABSOLUTE COUNT: 7.9 THOU/MM3 (ref 1.8–7.7)
SODIUM, WHOLE BLOOD: 133 MEQ/L (ref 138–146)
TOTAL CO2, WHOLE BLOOD: 26 MEQ/L (ref 23–33)
TOTAL PROTEIN: 7 G/DL (ref 6.1–8)
WBC # BLD: 10.8 THOU/MM3 (ref 4.8–10.8)

## 2019-09-23 PROCEDURE — 85025 COMPLETE CBC W/AUTO DIFF WBC: CPT

## 2019-09-23 PROCEDURE — 80047 BASIC METABLC PNL IONIZED CA: CPT

## 2019-09-23 PROCEDURE — 83615 LACTATE (LD) (LDH) ENZYME: CPT

## 2019-09-23 PROCEDURE — 36415 COLL VENOUS BLD VENIPUNCTURE: CPT

## 2019-09-23 PROCEDURE — 80076 HEPATIC FUNCTION PANEL: CPT

## 2019-09-23 RX ORDER — GLUCOSAMINE HCL/CHONDROITIN SU 500-400 MG
CAPSULE ORAL
Qty: 120 STRIP | Refills: 0 | Status: SHIPPED | OUTPATIENT
Start: 2019-09-23

## 2019-09-25 ENCOUNTER — HOSPITAL ENCOUNTER (OUTPATIENT)
Age: 54
Discharge: HOME | End: 2019-09-25
Payer: COMMERCIAL

## 2019-09-25 DIAGNOSIS — D59.10 AUTOIMMUNE HEMOLYTIC ANEMIA (HCC): ICD-10-CM

## 2019-09-25 LAB
BASINOPHIL, AUTOMATED: 0 % (ref 0–3)
EOSINOPHILS RELATIVE PERCENT: 1 % (ref 0–4)
HCT VFR BLD CALC: 31 % (ref 37–47)
HEMOGLOBIN: 10.5 GM/DL (ref 12–16)
LD: 241 U/L (ref 100–190)
LYMPHOCYTES # BLD: 15 % (ref 15–47)
MCH RBC QN AUTO: 32.8 PG (ref 27–31)
MCHC RBC AUTO-ENTMCNC: 34 GM/DL (ref 33–37)
MCV RBC AUTO: 97 FL (ref 81–99)
MONOCYTES: 6 % (ref 0–12)
PDW BLD-RTO: 21 % (ref 11.5–14.5)
PLATELET # BLD: 321 THOU/MM3 (ref 130–400)
PMV BLD AUTO: 6.9 FL (ref 7.4–10.4)
RBC # BLD: 3.21 MILL/MM3 (ref 4.2–5.4)
SEG NEUTROPHILS: 77 % (ref 43–75)
WBC # BLD: 9.8 THOU/MM3 (ref 4.8–10.8)

## 2019-09-25 PROCEDURE — 83615 LACTATE (LD) (LDH) ENZYME: CPT

## 2019-09-25 PROCEDURE — 36415 COLL VENOUS BLD VENIPUNCTURE: CPT

## 2019-09-25 PROCEDURE — 85025 COMPLETE CBC W/AUTO DIFF WBC: CPT

## 2019-09-27 ENCOUNTER — HOSPITAL ENCOUNTER (OUTPATIENT)
Dept: INFUSION THERAPY | Age: 54
Discharge: HOME | End: 2019-09-27
Payer: COMMERCIAL

## 2019-09-27 ENCOUNTER — OFFICE VISIT (OUTPATIENT)
Dept: ONCOLOGY | Age: 54
End: 2019-09-27
Payer: COMMERCIAL

## 2019-09-27 VITALS
BODY MASS INDEX: 34.99 KG/M2 | DIASTOLIC BLOOD PRESSURE: 79 MMHG | HEART RATE: 87 BPM | WEIGHT: 210 LBS | HEIGHT: 65 IN | OXYGEN SATURATION: 98 % | RESPIRATION RATE: 16 BRPM | TEMPERATURE: 97.1 F | SYSTOLIC BLOOD PRESSURE: 125 MMHG

## 2019-09-27 DIAGNOSIS — D59.10 AUTOIMMUNE HEMOLYTIC ANEMIA (HCC): Primary | ICD-10-CM

## 2019-09-27 DIAGNOSIS — Z79.52 ON PREDNISONE THERAPY: ICD-10-CM

## 2019-09-27 DIAGNOSIS — R06.02 SHORTNESS OF BREATH: ICD-10-CM

## 2019-09-27 DIAGNOSIS — D59.10 AUTOIMMUNE HEMOLYTIC ANEMIA (HCC): ICD-10-CM

## 2019-09-27 LAB
ALBUMIN SERPL-MCNC: 4.3 G/DL (ref 3.5–5.1)
ALP BLD-CCNC: 92 U/L (ref 38–126)
ALT SERPL-CCNC: 128 U/L (ref 11–66)
AST SERPL-CCNC: 39 U/L (ref 5–40)
BASINOPHIL, AUTOMATED: 0 % (ref 0–3)
BILIRUB SERPL-MCNC: 2.8 MG/DL (ref 0.3–1.2)
BILIRUBIN DIRECT: 0.7 MG/DL (ref 0–0.3)
EOSINOPHILS RELATIVE PERCENT: 1 % (ref 0–4)
HCT VFR BLD CALC: 29 % (ref 37–47)
HEMOGLOBIN: 9.8 GM/DL (ref 12–16)
LYMPHOCYTES # BLD: 14 % (ref 15–47)
MCH RBC QN AUTO: 33 PG (ref 27–31)
MCHC RBC AUTO-ENTMCNC: 33.9 GM/DL (ref 33–37)
MCV RBC AUTO: 97 FL (ref 81–99)
MONOCYTES: 4 % (ref 0–12)
PDW BLD-RTO: 20.3 % (ref 11.5–14.5)
PLATELET # BLD: 281 THOU/MM3 (ref 130–400)
PMV BLD AUTO: 6.9 FL (ref 7.4–10.4)
RBC # BLD: 2.98 MILL/MM3 (ref 4.2–5.4)
SEG NEUTROPHILS: 81 % (ref 43–75)
TOTAL PROTEIN: 7.3 G/DL (ref 6.1–8)
TSH SERPL DL<=0.05 MIU/L-ACNC: 1.17 UIU/ML (ref 0.4–4.2)
WBC # BLD: 8.7 THOU/MM3 (ref 4.8–10.8)

## 2019-09-27 PROCEDURE — 99214 OFFICE O/P EST MOD 30 MIN: CPT | Performed by: INTERNAL MEDICINE

## 2019-09-27 PROCEDURE — 84443 ASSAY THYROID STIM HORMONE: CPT

## 2019-09-27 PROCEDURE — 85025 COMPLETE CBC W/AUTO DIFF WBC: CPT

## 2019-09-27 PROCEDURE — 80076 HEPATIC FUNCTION PANEL: CPT

## 2019-09-27 PROCEDURE — 99211 OFF/OP EST MAY X REQ PHY/QHP: CPT

## 2019-09-27 PROCEDURE — 36415 COLL VENOUS BLD VENIPUNCTURE: CPT

## 2019-10-01 ENCOUNTER — HOSPITAL ENCOUNTER (OUTPATIENT)
Age: 54
Discharge: HOME | End: 2019-10-01
Payer: COMMERCIAL

## 2019-10-01 ENCOUNTER — HOSPITAL ENCOUNTER (OUTPATIENT)
Dept: NON INVASIVE DIAGNOSTICS | Age: 54
Discharge: HOME | End: 2019-10-01
Payer: COMMERCIAL

## 2019-10-01 DIAGNOSIS — D59.10 AUTOIMMUNE HEMOLYTIC ANEMIA (HCC): Primary | ICD-10-CM

## 2019-10-01 DIAGNOSIS — D59.10 AUTOIMMUNE HEMOLYTIC ANEMIA (HCC): ICD-10-CM

## 2019-10-01 PROBLEM — D58.9 HEMOLYTIC ANEMIA (HCC): Status: ACTIVE | Noted: 2019-10-01

## 2019-10-01 LAB
BASINOPHIL, AUTOMATED: 0 % (ref 0–3)
EOSINOPHILS RELATIVE PERCENT: 1 % (ref 0–4)
HCT VFR BLD CALC: 25.7 % (ref 37–47)
HEMOGLOBIN: 8.8 GM/DL (ref 12–16)
LV EF: 60 %
LVEF MODALITY: NORMAL
LYMPHOCYTES # BLD: 19 % (ref 15–47)
MCH RBC QN AUTO: 33.3 PG (ref 27–31)
MCHC RBC AUTO-ENTMCNC: 34.1 GM/DL (ref 33–37)
MCV RBC AUTO: 98 FL (ref 81–99)
MONOCYTES: 4 % (ref 0–12)
PDW BLD-RTO: 19 % (ref 11.5–14.5)
PLATELET # BLD: 243 THOU/MM3 (ref 130–400)
PMV BLD AUTO: 6.8 FL (ref 7.4–10.4)
RBC # BLD: 2.63 MILL/MM3 (ref 4.2–5.4)
SEG NEUTROPHILS: 76 % (ref 43–75)
WBC # BLD: 7.2 THOU/MM3 (ref 4.8–10.8)

## 2019-10-01 PROCEDURE — 85025 COMPLETE CBC W/AUTO DIFF WBC: CPT

## 2019-10-01 PROCEDURE — 36415 COLL VENOUS BLD VENIPUNCTURE: CPT

## 2019-10-01 PROCEDURE — 93306 TTE W/DOPPLER COMPLETE: CPT

## 2019-10-02 ENCOUNTER — HOSPITAL ENCOUNTER (OUTPATIENT)
Dept: INFUSION THERAPY | Age: 54
Discharge: HOME | End: 2019-10-02
Payer: COMMERCIAL

## 2019-10-02 VITALS — BODY MASS INDEX: 35 KG/M2 | WEIGHT: 210.1 LBS | HEIGHT: 65 IN

## 2019-10-02 DIAGNOSIS — D59.19 OTHER AUTOIMMUNE HEMOLYTIC ANEMIAS: ICD-10-CM

## 2019-10-02 PROCEDURE — 99212 OFFICE O/P EST SF 10 MIN: CPT

## 2019-10-02 ASSESSMENT — ENCOUNTER SYMPTOMS
SHORTNESS OF BREATH: 1
CHEST TIGHTNESS: 0
DIARRHEA: 0
DIARRHEA: 0
COUGH: 0
ABDOMINAL PAIN: 0
BACK PAIN: 0
COUGH: 0
WHEEZING: 0
CONSTIPATION: 0
CONSTIPATION: 0
SORE THROAT: 0
ABDOMINAL DISTENTION: 0
COLOR CHANGE: 0
WHEEZING: 0
ABDOMINAL DISTENTION: 0
COUGH: 0
ABDOMINAL PAIN: 0
VOMITING: 0
EYE DISCHARGE: 0
RECTAL PAIN: 0
SORE THROAT: 0
ABDOMINAL PAIN: 0
RECTAL PAIN: 0
COLOR CHANGE: 0
RECTAL PAIN: 0
NAUSEA: 0
BACK PAIN: 1
SORE THROAT: 0
FACIAL SWELLING: 0
SHORTNESS OF BREATH: 0
VOMITING: 0
SHORTNESS OF BREATH: 1
NAUSEA: 0
FACIAL SWELLING: 0
VOMITING: 0
EYE DISCHARGE: 0
RECTAL PAIN: 0
COUGH: 0
EYE DISCHARGE: 0
WHEEZING: 0
SORE THROAT: 0
ABDOMINAL DISTENTION: 0
DIARRHEA: 0
CHEST TIGHTNESS: 0
ABDOMINAL PAIN: 1
CONSTIPATION: 0
BLOOD IN STOOL: 0
CHEST TIGHTNESS: 0
WHEEZING: 0
ABDOMINAL DISTENTION: 0
CONSTIPATION: 0
CHEST TIGHTNESS: 0
DIARRHEA: 0
BLOOD IN STOOL: 0
TROUBLE SWALLOWING: 0
VOMITING: 0
TROUBLE SWALLOWING: 0
SHORTNESS OF BREATH: 1
FACIAL SWELLING: 0
BLOOD IN STOOL: 0
TROUBLE SWALLOWING: 0
NAUSEA: 0
BLOOD IN STOOL: 0
COLOR CHANGE: 0
COLOR CHANGE: 0
TROUBLE SWALLOWING: 0
BACK PAIN: 1
NAUSEA: 0
EYE DISCHARGE: 0
BACK PAIN: 1
FACIAL SWELLING: 0

## 2019-10-02 NOTE — PLAN OF CARE
Problem: Musculor/Skeletal Functional Status  Goal: Absence of falls  Outcome: Met This Shift  Note:   No falls this admission  Intervention: Fall precautions  Note:   Patient aware of fall precautions for here and at home -call light in reach while here       Problem: Intellectual/Education/Knowledge Deficit  Goal: Teaching initiated upon admission  Outcome: Met This Shift  Note:   Chemotherapy Teaching     What is Chemotherapy   Drug action ? Method of Administration ? Handouts given ? Side Effects  Nausea/vomiting ? Diarrhea ? Fatigue ? Signs / Symptoms of infection ? Neutropenia ? Thrombocytopenia ? Alopecia ? neuropathy ? [place] diet &  the importance of fluids ? Micellaneous  Importance of nutrition ? Importance of oral hygiene ? When to call the MD ?   Monitoring labs ? Use of supportive services ? Explanation of Drug   rituxin     Comments  Verbalized understanding to drug,action,side effects and when to call MD      Goal: Written Disposition Instruction form completed  Outcome: Met This Shift  Note:   Discharge instructions given and reviewed with patient. All questions answered. Patient verbalized understanding   Intervention: Verbal/written education provided  Note:   Discharge instruction sheets     Problem: Discharge Planning  Goal: Knowledge of discharge instructions  Description  Knowledge of discharge instructions     Outcome: Met This Shift  Note:   Patient and family member able to teach back follow up appointments and when to call the doctor. Patient offers no questions at this time   Intervention: Interaction with patient/family and care team  Note:   Patient and family currently denies any needs or concerns    Intervention: Discharge to appropriate level of care  Note:   Discharge home   Care plan reviewed with patient and mother. Patient and mother verbalize understanding of the plan of care and contribute to goal setting.
No

## 2019-10-03 NOTE — PROGRESS NOTES
Patient and family instructed on chemotherapy specifically the drugs rituxin and  chemotherapy regimen. The chemotherapy teaching  folder along with the following - chemotherapy drug information sheets,chemotherapy side effects handouts,Understanding your blood counts, ,Importance to hydration,when to call physician sheet,reduce risk infection sheet,All questions answered satisfactory and support given. Patient given a tour of facility. Approximately 35 minutes spent with patient and family. Pt discharged in stable condition with verbalization of discharge instructions all questions answered and all  belongings sent with patient. Patient accompanied off unit by family.

## 2019-10-04 ENCOUNTER — HOSPITAL ENCOUNTER (OUTPATIENT)
Age: 54
Discharge: HOME | End: 2019-10-04
Payer: COMMERCIAL

## 2019-10-04 DIAGNOSIS — D59.10 AUTOIMMUNE HEMOLYTIC ANEMIA (HCC): Primary | ICD-10-CM

## 2019-10-04 DIAGNOSIS — D59.10 AUTOIMMUNE HEMOLYTIC ANEMIA (HCC): ICD-10-CM

## 2019-10-04 LAB
BASINOPHIL, AUTOMATED: 0 % (ref 0–3)
EOSINOPHILS RELATIVE PERCENT: 1 % (ref 0–4)
HCT VFR BLD CALC: 24.7 % (ref 37–47)
HEMOGLOBIN: 8.3 GM/DL (ref 12–16)
LYMPHOCYTES # BLD: 25 % (ref 15–47)
MCH RBC QN AUTO: 33.6 PG (ref 27–31)
MCHC RBC AUTO-ENTMCNC: 33.7 GM/DL (ref 33–37)
MCV RBC AUTO: 100 FL (ref 81–99)
MONOCYTES: 4 % (ref 0–12)
PDW BLD-RTO: 19.1 % (ref 11.5–14.5)
PLATELET # BLD: 297 THOU/MM3 (ref 130–400)
PMV BLD AUTO: 6.8 FL (ref 7.4–10.4)
RBC # BLD: 2.48 MILL/MM3 (ref 4.2–5.4)
SEG NEUTROPHILS: 70 % (ref 43–75)
WBC # BLD: 5.7 THOU/MM3 (ref 4.8–10.8)

## 2019-10-04 PROCEDURE — 36415 COLL VENOUS BLD VENIPUNCTURE: CPT

## 2019-10-04 PROCEDURE — 85025 COMPLETE CBC W/AUTO DIFF WBC: CPT

## 2019-10-07 ENCOUNTER — HOSPITAL ENCOUNTER (OUTPATIENT)
Dept: INFUSION THERAPY | Age: 54
Discharge: HOME | End: 2019-10-07
Payer: COMMERCIAL

## 2019-10-07 VITALS
HEART RATE: 95 BPM | BODY MASS INDEX: 36.82 KG/M2 | WEIGHT: 221 LBS | RESPIRATION RATE: 16 BRPM | OXYGEN SATURATION: 100 % | HEIGHT: 65 IN | DIASTOLIC BLOOD PRESSURE: 64 MMHG | SYSTOLIC BLOOD PRESSURE: 117 MMHG | TEMPERATURE: 98.8 F

## 2019-10-07 DIAGNOSIS — D59.19 OTHER AUTOIMMUNE HEMOLYTIC ANEMIAS: Primary | ICD-10-CM

## 2019-10-07 PROCEDURE — 6370000000 HC RX 637 (ALT 250 FOR IP): Performed by: INTERNAL MEDICINE

## 2019-10-07 PROCEDURE — 2580000003 HC RX 258: Performed by: INTERNAL MEDICINE

## 2019-10-07 PROCEDURE — 96413 CHEMO IV INFUSION 1 HR: CPT

## 2019-10-07 PROCEDURE — 96415 CHEMO IV INFUSION ADDL HR: CPT

## 2019-10-07 PROCEDURE — 96375 TX/PRO/DX INJ NEW DRUG ADDON: CPT

## 2019-10-07 PROCEDURE — 6360000002 HC RX W HCPCS: Performed by: INTERNAL MEDICINE

## 2019-10-07 RX ORDER — ACETAMINOPHEN 325 MG/1
650 TABLET ORAL ONCE
Status: COMPLETED | OUTPATIENT
Start: 2019-10-07 | End: 2019-10-07

## 2019-10-07 RX ORDER — DIPHENHYDRAMINE HYDROCHLORIDE 50 MG/ML
25 INJECTION INTRAMUSCULAR; INTRAVENOUS ONCE
Status: CANCELLED | OUTPATIENT
Start: 2019-10-14

## 2019-10-07 RX ORDER — SODIUM CHLORIDE 9 MG/ML
INJECTION, SOLUTION INTRAVENOUS CONTINUOUS
Status: ACTIVE | OUTPATIENT
Start: 2019-10-07 | End: 2019-10-07

## 2019-10-07 RX ORDER — SODIUM CHLORIDE 9 MG/ML
INJECTION, SOLUTION INTRAVENOUS CONTINUOUS
Status: CANCELLED | OUTPATIENT
Start: 2019-10-14

## 2019-10-07 RX ORDER — ACETAMINOPHEN 325 MG/1
650 TABLET ORAL ONCE
Status: CANCELLED | OUTPATIENT
Start: 2019-10-14

## 2019-10-07 RX ORDER — DIPHENHYDRAMINE HYDROCHLORIDE 50 MG/ML
25 INJECTION INTRAMUSCULAR; INTRAVENOUS ONCE
Status: COMPLETED | OUTPATIENT
Start: 2019-10-07 | End: 2019-10-07

## 2019-10-07 RX ADMIN — RITUXIMAB 800 MG: 10 INJECTION, SOLUTION INTRAVENOUS at 09:30

## 2019-10-07 RX ADMIN — SODIUM CHLORIDE: 9 INJECTION, SOLUTION INTRAVENOUS at 08:32

## 2019-10-07 RX ADMIN — DIPHENHYDRAMINE HYDROCHLORIDE 25 MG: 50 INJECTION, SOLUTION INTRAMUSCULAR; INTRAVENOUS at 09:14

## 2019-10-07 RX ADMIN — ACETAMINOPHEN 650 MG: 325 TABLET ORAL at 09:12

## 2019-10-07 ASSESSMENT — PAIN SCALES - GENERAL: PAINLEVEL_OUTOF10: 0

## 2019-10-07 NOTE — ONCOLOGY
Chemotherapy Administration    Pre-assessment Data: Antineoplastic Agents  Other:   See toxicity flow sheet for assessment [x]     Physician Notification of Concerns Related to Chemotherapy Administration:   Physician Notified Angelina Nura / Time of Notification      Interventions:   Lab work assessed  [x]   Height / Weight verified for dose [x]   Current MAR reviewed [x]   Emergency drugs available as appropriate [x]   Anaphylaxis assessment completed [x]   Pre-medications administered as ordered [x]   Blood return noted upon initiation of chemotherapy [x]   Blood return noted each 1-2ml of a vesicant medication if given IV push []   Blood return noted each 2-3ml of a non-vesicant medication if given IV push []   Monitor for signs / symptoms of hypersensitivity reaction [x]   Chemotherapy orders (drug/dose/rate) verified by 2 Chemo certified RNs [x]   Monitor IV site and blood return throughout the infusion of the medication [x]   Document IV site checks on the IV assessment form [x]   Document chemotherapy teaching on the Patient Education tab [x]   Document patient verbalizes understanding of medications being administered [x]   If IV infiltration, see ONS Guidelines []   Other:      []         Rituxan Administration:  First Infusion:  initial infusion rate should be 50 mg/hr. If no infusion related problems, increase IV rate in 50mg/hr increments every 30 min. Maximum rate of infusion is 400mg/hr or as ordered by physician. Subsequent Infusions: initial rate of 100mg/hr, increase in 100mg/hr increments every 30 minutes. Maximum rate of infusion is 400mg/hr or as ordered by the physician. If hypersensitivity reaction occurs, STOP RITUXAN and maintain plain IV. Notify physician immediately for additional orders.    Pre-medication s administered as ordered [x]   Assess for history of angina or heart arrhythmias [x]   Hypertensive meds held for 12 hours prior to Rituxan administration [x]   Document blood pressure, pulse, respiratory rate at start of infusion [x]   Document blood pressure, pulse, respiratory rate every 30 min with each increase in IV rate [x]   With each increase in IV rate, document if a reaction is suspected [x]   Document blood pressure, pulse, respiratory rate at the completion of infusion [x]   Other:    []

## 2019-10-07 NOTE — PROGRESS NOTES
Patient assessed for the following post chemotherapy:    Dizziness              No  Lightheadedness  No     Acute nausea/vomiting No  Headache   No  Chest pain/pressure  No  Rash/itching   No  Shortness of breath  No    Patient kept for 20 minutes observation post infusion chemotherapy. Patient tolerated chemotherapy treatment rituxan without any complications. Last vital signs:   /64   Pulse 95   Temp 98.8 °F (37.1 °C) (Oral)   Resp 16   Ht 5' 5\" (1.651 m)   Wt 221 lb (100.2 kg)   SpO2 100%   BMI 36.78 kg/m²     Patient instructed if experience any of the above symptoms following today's infusion,he/she is to notify MD immediately or go to the emergency department. Discharge instructions given to patient. Verbalizes understanding. Ambulated off unit per self with family with belongings.

## 2019-10-07 NOTE — PLAN OF CARE
Problem: Musculor/Skeletal Functional Status  Goal: Absence of falls  Outcome: Met This Shift  Note:   Free from falls while in [facility]. Intervention: Fall precautions  Note:   Discussed the need to use the call light for assistance when getting up to ambulate. Call light within reach. Problem: Intellectual/Education/Knowledge Deficit  Goal: Teaching initiated upon admission  Outcome: Met This Shift  Note:   Patient verbalizes understanding to verbal information given on rituxan,action and possible side effects. Aware to call MD if develop complications. Goal: Written Disposition Instruction form completed  Outcome: Met This Shift  Intervention: Verbal/written education provided  Note:   Discuss understanding to verbal information given on rituxan. Problem: Discharge Planning  Goal: Knowledge of discharge instructions  Description  Knowledge of discharge instructions     Outcome: Met This Shift  Note:   Verbalize understanding of discharge instructions, follow up appointments, and when to call Physician. Intervention: Interaction with patient/family and care team  Note:   Provide discharge instructions. Care plan reviewed with patient and family. Patient and family verbalize understanding of the plan of care and contribute to goal setting.

## 2019-10-09 DIAGNOSIS — D59.10 AUTOIMMUNE HEMOLYTIC ANEMIA (HCC): Primary | ICD-10-CM

## 2019-10-10 ENCOUNTER — OFFICE VISIT (OUTPATIENT)
Dept: ONCOLOGY | Age: 54
End: 2019-10-10
Payer: COMMERCIAL

## 2019-10-10 ENCOUNTER — HOSPITAL ENCOUNTER (OUTPATIENT)
Dept: INFUSION THERAPY | Age: 54
Discharge: HOME | End: 2019-10-10
Payer: COMMERCIAL

## 2019-10-10 VITALS
WEIGHT: 215.2 LBS | RESPIRATION RATE: 16 BRPM | BODY MASS INDEX: 35.85 KG/M2 | HEIGHT: 65 IN | TEMPERATURE: 98.7 F | OXYGEN SATURATION: 100 % | HEART RATE: 110 BPM | DIASTOLIC BLOOD PRESSURE: 75 MMHG | SYSTOLIC BLOOD PRESSURE: 119 MMHG

## 2019-10-10 DIAGNOSIS — E80.6 HYPERBILIRUBINEMIA: ICD-10-CM

## 2019-10-10 DIAGNOSIS — D59.10 AUTOIMMUNE HEMOLYTIC ANEMIA (HCC): ICD-10-CM

## 2019-10-10 DIAGNOSIS — D59.10 AUTOIMMUNE HEMOLYTIC ANEMIA (HCC): Primary | ICD-10-CM

## 2019-10-10 DIAGNOSIS — Z79.52 ON PREDNISONE THERAPY: ICD-10-CM

## 2019-10-10 LAB
ABSOLUTE RETIC #: 158 THOU/MM3 (ref 20–115)
ALBUMIN SERPL-MCNC: 3.9 G/DL (ref 3.5–5.1)
ALP BLD-CCNC: 80 U/L (ref 38–126)
ALT SERPL-CCNC: 87 U/L (ref 11–66)
ANTIBODY SCREEN: NORMAL
AST SERPL-CCNC: 37 U/L (ref 5–40)
BASINOPHIL, AUTOMATED: 0 % (ref 0–3)
BILIRUB SERPL-MCNC: 1.2 MG/DL (ref 0.3–1.2)
BILIRUBIN DIRECT: 0.3 MG/DL (ref 0–0.3)
DIRECT COOMBS: NORMAL
EOSINOPHILS RELATIVE PERCENT: 1 % (ref 0–4)
HCT VFR BLD CALC: 24.4 % (ref 37–47)
HEMOGLOBIN: 8.1 GM/DL (ref 12–16)
IMMATURE RETIC FRACT: 43.1 % (ref 3–15.9)
LD: 262 U/L (ref 100–190)
LYMPHOCYTES # BLD: 32 % (ref 15–47)
MCH RBC QN AUTO: 34.2 PG (ref 27–31)
MCHC RBC AUTO-ENTMCNC: 33.3 GM/DL (ref 33–37)
MCV RBC AUTO: 103 FL (ref 81–99)
MONOCYTES: 9 % (ref 0–12)
PDW BLD-RTO: 17 % (ref 11.5–14.5)
PLATELET # BLD: 351 THOU/MM3 (ref 130–400)
PMV BLD AUTO: 6.5 FL (ref 7.4–10.4)
RBC # BLD: 2.37 MILL/MM3 (ref 4.2–5.4)
RETIC HEMOGLOBIN: 33.6 PG (ref 28.2–35.7)
RETICULOCYTE ABSOLUTE COUNT: 6.7 % (ref 0.5–2)
SEG NEUTROPHILS: 58 % (ref 43–75)
TOTAL PROTEIN: 6.5 G/DL (ref 6.1–8)
WBC # BLD: 3.9 THOU/MM3 (ref 4.8–10.8)

## 2019-10-10 PROCEDURE — 86880 COOMBS TEST DIRECT: CPT

## 2019-10-10 PROCEDURE — 86850 RBC ANTIBODY SCREEN: CPT

## 2019-10-10 PROCEDURE — 80076 HEPATIC FUNCTION PANEL: CPT

## 2019-10-10 PROCEDURE — 85046 RETICYTE/HGB CONCENTRATE: CPT

## 2019-10-10 PROCEDURE — 85025 COMPLETE CBC W/AUTO DIFF WBC: CPT

## 2019-10-10 PROCEDURE — 99211 OFF/OP EST MAY X REQ PHY/QHP: CPT

## 2019-10-10 PROCEDURE — 99214 OFFICE O/P EST MOD 30 MIN: CPT | Performed by: INTERNAL MEDICINE

## 2019-10-10 PROCEDURE — 83615 LACTATE (LD) (LDH) ENZYME: CPT

## 2019-10-10 PROCEDURE — 36415 COLL VENOUS BLD VENIPUNCTURE: CPT

## 2019-10-10 RX ORDER — SODIUM CHLORIDE 0.9 % (FLUSH) 0.9 %
10 SYRINGE (ML) INJECTION PRN
Status: CANCELLED | OUTPATIENT
Start: 2019-10-10

## 2019-10-10 RX ORDER — PREDNISONE 20 MG/1
60 TABLET ORAL 2 TIMES DAILY
Qty: 126 TABLET | Refills: 0 | Status: SHIPPED | OUTPATIENT
Start: 2019-10-10 | End: 2019-10-28 | Stop reason: DRUGHIGH

## 2019-10-10 RX ORDER — HEPARIN SODIUM (PORCINE) LOCK FLUSH IV SOLN 100 UNIT/ML 100 UNIT/ML
500 SOLUTION INTRAVENOUS PRN
Status: CANCELLED | OUTPATIENT
Start: 2019-10-10

## 2019-10-10 RX ORDER — SODIUM CHLORIDE 0.9 % (FLUSH) 0.9 %
20 SYRINGE (ML) INJECTION PRN
Status: CANCELLED | OUTPATIENT
Start: 2019-10-10

## 2019-10-14 ENCOUNTER — OFFICE VISIT (OUTPATIENT)
Dept: PULMONOLOGY | Age: 54
End: 2019-10-14
Payer: COMMERCIAL

## 2019-10-14 ENCOUNTER — HOSPITAL ENCOUNTER (OUTPATIENT)
Dept: INFUSION THERAPY | Age: 54
Discharge: HOME | End: 2019-10-14
Payer: COMMERCIAL

## 2019-10-14 VITALS
RESPIRATION RATE: 16 BRPM | DIASTOLIC BLOOD PRESSURE: 58 MMHG | SYSTOLIC BLOOD PRESSURE: 108 MMHG | WEIGHT: 214.6 LBS | OXYGEN SATURATION: 98 % | HEART RATE: 84 BPM | HEIGHT: 65 IN | BODY MASS INDEX: 35.75 KG/M2 | TEMPERATURE: 98.1 F

## 2019-10-14 VITALS
DIASTOLIC BLOOD PRESSURE: 64 MMHG | OXYGEN SATURATION: 99 % | SYSTOLIC BLOOD PRESSURE: 110 MMHG | HEIGHT: 65 IN | BODY MASS INDEX: 35.85 KG/M2 | WEIGHT: 215.2 LBS | HEART RATE: 99 BPM

## 2019-10-14 DIAGNOSIS — G47.33 OBSTRUCTIVE SLEEP APNEA ON CPAP: Primary | ICD-10-CM

## 2019-10-14 DIAGNOSIS — D59.10 AUTOIMMUNE HEMOLYTIC ANEMIA (HCC): Primary | ICD-10-CM

## 2019-10-14 DIAGNOSIS — G47.01 INSOMNIA DUE TO MEDICAL CONDITION: ICD-10-CM

## 2019-10-14 DIAGNOSIS — E66.9 OBESITY (BMI 30-39.9): ICD-10-CM

## 2019-10-14 DIAGNOSIS — D59.19 OTHER AUTOIMMUNE HEMOLYTIC ANEMIAS: ICD-10-CM

## 2019-10-14 DIAGNOSIS — Z99.89 OBSTRUCTIVE SLEEP APNEA ON CPAP: Primary | ICD-10-CM

## 2019-10-14 LAB
BASINOPHIL, AUTOMATED: 0 % (ref 0–3)
EOSINOPHILS RELATIVE PERCENT: 1 % (ref 0–4)
HCT VFR BLD CALC: 26.5 % (ref 37–47)
HEMOGLOBIN: 8.8 GM/DL (ref 12–16)
LYMPHOCYTES # BLD: 17 % (ref 15–47)
MCH RBC QN AUTO: 34.2 PG (ref 27–31)
MCHC RBC AUTO-ENTMCNC: 33.2 GM/DL (ref 33–37)
MCV RBC AUTO: 103 FL (ref 81–99)
MONOCYTES: 3 % (ref 0–12)
PDW BLD-RTO: 14.8 % (ref 11.5–14.5)
PLATELET # BLD: 407 THOU/MM3 (ref 130–400)
PMV BLD AUTO: 6.5 FL (ref 7.4–10.4)
RBC # BLD: 2.57 MILL/MM3 (ref 4.2–5.4)
SEG NEUTROPHILS: 79 % (ref 43–75)
WBC # BLD: 5.3 THOU/MM3 (ref 4.8–10.8)

## 2019-10-14 PROCEDURE — 96415 CHEMO IV INFUSION ADDL HR: CPT

## 2019-10-14 PROCEDURE — 96375 TX/PRO/DX INJ NEW DRUG ADDON: CPT

## 2019-10-14 PROCEDURE — 6360000002 HC RX W HCPCS: Performed by: INTERNAL MEDICINE

## 2019-10-14 PROCEDURE — 6370000000 HC RX 637 (ALT 250 FOR IP): Performed by: INTERNAL MEDICINE

## 2019-10-14 PROCEDURE — 36415 COLL VENOUS BLD VENIPUNCTURE: CPT

## 2019-10-14 PROCEDURE — 2580000003 HC RX 258: Performed by: INTERNAL MEDICINE

## 2019-10-14 PROCEDURE — 96413 CHEMO IV INFUSION 1 HR: CPT

## 2019-10-14 PROCEDURE — 99214 OFFICE O/P EST MOD 30 MIN: CPT | Performed by: PHYSICIAN ASSISTANT

## 2019-10-14 PROCEDURE — 85025 COMPLETE CBC W/AUTO DIFF WBC: CPT

## 2019-10-14 RX ORDER — DIPHENHYDRAMINE HYDROCHLORIDE 50 MG/ML
25 INJECTION INTRAMUSCULAR; INTRAVENOUS ONCE
Status: CANCELLED | OUTPATIENT
Start: 2019-10-21

## 2019-10-14 RX ORDER — SODIUM CHLORIDE 9 MG/ML
INJECTION, SOLUTION INTRAVENOUS CONTINUOUS
Status: DISCONTINUED | OUTPATIENT
Start: 2019-10-14 | End: 2019-10-15 | Stop reason: HOSPADM

## 2019-10-14 RX ORDER — DIPHENHYDRAMINE HYDROCHLORIDE 50 MG/ML
25 INJECTION INTRAMUSCULAR; INTRAVENOUS ONCE
Status: COMPLETED | OUTPATIENT
Start: 2019-10-14 | End: 2019-10-14

## 2019-10-14 RX ORDER — ACETAMINOPHEN 325 MG/1
650 TABLET ORAL ONCE
Status: CANCELLED | OUTPATIENT
Start: 2019-10-21

## 2019-10-14 RX ORDER — ACETAMINOPHEN 325 MG/1
650 TABLET ORAL ONCE
Status: COMPLETED | OUTPATIENT
Start: 2019-10-14 | End: 2019-10-14

## 2019-10-14 RX ORDER — SODIUM CHLORIDE 9 MG/ML
INJECTION, SOLUTION INTRAVENOUS CONTINUOUS
Status: CANCELLED | OUTPATIENT
Start: 2019-10-21

## 2019-10-14 RX ORDER — TRAZODONE HYDROCHLORIDE 50 MG/1
50 TABLET ORAL NIGHTLY PRN
Qty: 30 TABLET | Refills: 0 | Status: SHIPPED | OUTPATIENT
Start: 2019-10-14 | End: 2020-02-17

## 2019-10-14 RX ADMIN — RITUXIMAB 800 MG: 10 INJECTION, SOLUTION INTRAVENOUS at 12:55

## 2019-10-14 RX ADMIN — SODIUM CHLORIDE: 9 INJECTION, SOLUTION INTRAVENOUS at 12:20

## 2019-10-14 RX ADMIN — ACETAMINOPHEN 650 MG: 325 TABLET ORAL at 12:23

## 2019-10-14 RX ADMIN — DIPHENHYDRAMINE HYDROCHLORIDE 25 MG: 50 INJECTION, SOLUTION INTRAMUSCULAR; INTRAVENOUS at 12:26

## 2019-10-14 ASSESSMENT — ENCOUNTER SYMPTOMS
WHEEZING: 0
ALLERGIC/IMMUNOLOGIC NEGATIVE: 1
STRIDOR: 0
CHEST TIGHTNESS: 0
DIARRHEA: 0
COUGH: 0
NAUSEA: 0
SHORTNESS OF BREATH: 0
BACK PAIN: 0
EYES NEGATIVE: 1

## 2019-10-14 ASSESSMENT — PAIN SCALES - GENERAL: PAINLEVEL_OUTOF10: 0

## 2019-10-14 NOTE — PROGRESS NOTES
Patient assessed for the following post chemotherapy:    Dizziness              No  Lightheadedness  No     Acute nausea/vomiting No  Headache   No  Chest pain/pressure  No  Rash/itching   No  Shortness of breath             No    Patient tolerated chemotherapy treatment rituxan without any complications. Last vital signs:   BP (!) 108/58   Pulse 84   Temp 98.1 °F (36.7 °C) (Oral)   Resp 16   Ht 5' 5\" (1.651 m)   Wt 214 lb 9.6 oz (97.3 kg)   SpO2 98%   BMI 35.71 kg/m²     Patient instructed if experience any of the above symptoms following today's infusion,he/she is to notify MD immediately or go to the emergency department. Discharge instructions given to patient. Verbalizes understanding. Ambulated off unit per self with belongings.

## 2019-10-14 NOTE — PLAN OF CARE
Problem: Musculor/Skeletal Functional Status  Goal: Absence of falls  Outcome: Met This Shift  Note:   Free from falls while in [facility]. Intervention: Fall precautions  Note:   Discussed the need to use the call light for assistance when getting up to ambulate. Call light within reach. Problem: Intellectual/Education/Knowledge Deficit  Goal: Teaching initiated upon admission  Outcome: Met This Shift  Note:   Patient verbalizes understanding to verbal information given on rituxan,action and possible side effects. Aware to call MD if develop complications. Goal: Written Disposition Instruction form completed  Outcome: Met This Shift  Intervention: Verbal/written education provided  Note:   Chemotherapy Teaching     What is Chemotherapy   Drug action ? Method of Administration ? Handouts given ? Side Effects  Nausea/vomiting ? Diarrhea ? Fatigue ? Signs / Symptoms of infection ? Neutropenia ? Thrombocytopenia ? Alopecia ? neuropathy ? [place] diet &  the importance of fluids ? Micellaneous  Importance of nutrition ? Importance of oral hygiene ? When to call the MD ?   Monitoring labs ? Use of supportive services ? Explanation of Drug Regimen / Frequency  Rituxan      Comments  Verbalized understanding to drug,action,side effects and when to call MD         Problem: Discharge Planning  Goal: Knowledge of discharge instructions  Description  Knowledge of discharge instructions     Outcome: Met This Shift  Note:   Verbalize understanding of discharge instructions, follow up appointments, and when to call Physician. Intervention: Interaction with patient/family and care team  Note:   Provide discharge instructions.

## 2019-10-14 NOTE — ONCOLOGY
Chemotherapy Administration    Pre-assessment Data: Antineoplastic Agents  Other:   See toxicity flow sheet for assessment [x]     Physician Notification of Concerns Related to Chemotherapy Administration:   Physician Notified Corrinne Mc / Time of Notification      Interventions:   Lab work assessed  [x]   Height / Weight verified for dose [x]   Current MAR reviewed [x]   Emergency drugs available as appropriate [x]   Anaphylaxis assessment completed [x]   Pre-medications administered as ordered [x]   Blood return noted upon initiation of chemotherapy [x]   Blood return noted each 1-2ml of a vesicant medication if given IV push []   Blood return noted each 2-3ml of a non-vesicant medication if given IV push []   Monitor for signs / symptoms of hypersensitivity reaction [x]   Chemotherapy orders (drug/dose/rate) verified by 2 Chemo certified RNs [x]   Monitor IV site and blood return throughout the infusion of the medication [x]   Document IV site checks on the IV assessment form [x]   Document chemotherapy teaching on the Patient Education tab [x]   Document patient verbalizes understanding of medications being administered [x]   If IV infiltration, see ONS Guidelines []   Other:      []         Rituxan Administration:  First Infusion:  initial infusion rate should be 50 mg/hr. If no infusion related problems, increase IV rate in 50mg/hr increments every 30 min. Maximum rate of infusion is 400mg/hr or as ordered by physician. Subsequent Infusions: initial rate of 100mg/hr, increase in 100mg/hr increments every 30 minutes. Maximum rate of infusion is 400mg/hr or as ordered by the physician. If hypersensitivity reaction occurs, STOP RITUXAN and maintain plain IV. Notify physician immediately for additional orders.    Pre-medication s administered as ordered [x]   Assess for history of angina or heart arrhythmias [x]   Hypertensive meds held for 12 hours prior to Rituxan administration [x]   Document blood pressure, pulse, respiratory rate at start of infusion [x]   Document blood pressure, pulse, respiratory rate every 30 min with each increase in IV rate [x]   With each increase in IV rate, document if a reaction is suspected [x]   Document blood pressure, pulse, respiratory rate at the completion of infusion [x]   Other:    []

## 2019-10-18 ENCOUNTER — OFFICE VISIT (OUTPATIENT)
Dept: ONCOLOGY | Age: 54
End: 2019-10-18
Payer: COMMERCIAL

## 2019-10-18 ENCOUNTER — HOSPITAL ENCOUNTER (OUTPATIENT)
Dept: INFUSION THERAPY | Age: 54
Discharge: HOME | End: 2019-10-18
Payer: COMMERCIAL

## 2019-10-18 VITALS
OXYGEN SATURATION: 97 % | BODY MASS INDEX: 36.59 KG/M2 | DIASTOLIC BLOOD PRESSURE: 77 MMHG | WEIGHT: 219.6 LBS | TEMPERATURE: 98.6 F | HEART RATE: 89 BPM | HEIGHT: 65 IN | SYSTOLIC BLOOD PRESSURE: 121 MMHG | RESPIRATION RATE: 18 BRPM

## 2019-10-18 DIAGNOSIS — D59.10 AUTOIMMUNE HEMOLYTIC ANEMIA (HCC): ICD-10-CM

## 2019-10-18 DIAGNOSIS — Z79.620 VISIT FOR MONITORING RITUXAN THERAPY: ICD-10-CM

## 2019-10-18 DIAGNOSIS — D59.10 AUTOIMMUNE HEMOLYTIC ANEMIA (HCC): Primary | ICD-10-CM

## 2019-10-18 DIAGNOSIS — Z79.52 ON PREDNISONE THERAPY: ICD-10-CM

## 2019-10-18 DIAGNOSIS — Z51.81 VISIT FOR MONITORING RITUXAN THERAPY: ICD-10-CM

## 2019-10-18 LAB
BASINOPHIL, AUTOMATED: 0 % (ref 0–3)
EOSINOPHILS RELATIVE PERCENT: 1 % (ref 0–4)
HCT VFR BLD CALC: 27.8 % (ref 37–47)
HEMOGLOBIN: 9.2 GM/DL (ref 12–16)
LYMPHOCYTES # BLD: 20 % (ref 15–47)
MCH RBC QN AUTO: 34.4 PG (ref 27–31)
MCHC RBC AUTO-ENTMCNC: 33.2 GM/DL (ref 33–37)
MCV RBC AUTO: 103 FL (ref 81–99)
MONOCYTES: 4 % (ref 0–12)
PDW BLD-RTO: 13.8 % (ref 11.5–14.5)
PLATELET # BLD: 430 THOU/MM3 (ref 130–400)
PMV BLD AUTO: 6.5 FL (ref 7.4–10.4)
RBC # BLD: 2.68 MILL/MM3 (ref 4.2–5.4)
SEG NEUTROPHILS: 76 % (ref 43–75)
WBC # BLD: 5.7 THOU/MM3 (ref 4.8–10.8)

## 2019-10-18 PROCEDURE — 99211 OFF/OP EST MAY X REQ PHY/QHP: CPT

## 2019-10-18 PROCEDURE — 99214 OFFICE O/P EST MOD 30 MIN: CPT | Performed by: INTERNAL MEDICINE

## 2019-10-18 PROCEDURE — 36415 COLL VENOUS BLD VENIPUNCTURE: CPT

## 2019-10-18 PROCEDURE — 85025 COMPLETE CBC W/AUTO DIFF WBC: CPT

## 2019-10-21 ENCOUNTER — HOSPITAL ENCOUNTER (OUTPATIENT)
Dept: INFUSION THERAPY | Age: 54
Discharge: HOME | End: 2019-10-21
Payer: COMMERCIAL

## 2019-10-21 VITALS
HEART RATE: 89 BPM | HEIGHT: 65 IN | DIASTOLIC BLOOD PRESSURE: 60 MMHG | SYSTOLIC BLOOD PRESSURE: 123 MMHG | OXYGEN SATURATION: 98 % | RESPIRATION RATE: 18 BRPM | TEMPERATURE: 98.5 F | BODY MASS INDEX: 36.55 KG/M2 | WEIGHT: 219.4 LBS

## 2019-10-21 DIAGNOSIS — D59.19 OTHER AUTOIMMUNE HEMOLYTIC ANEMIAS: Primary | ICD-10-CM

## 2019-10-21 PROCEDURE — 6360000002 HC RX W HCPCS: Performed by: INTERNAL MEDICINE

## 2019-10-21 PROCEDURE — 96413 CHEMO IV INFUSION 1 HR: CPT

## 2019-10-21 PROCEDURE — 2580000003 HC RX 258: Performed by: INTERNAL MEDICINE

## 2019-10-21 PROCEDURE — 6370000000 HC RX 637 (ALT 250 FOR IP): Performed by: INTERNAL MEDICINE

## 2019-10-21 PROCEDURE — 96415 CHEMO IV INFUSION ADDL HR: CPT

## 2019-10-21 PROCEDURE — 96375 TX/PRO/DX INJ NEW DRUG ADDON: CPT

## 2019-10-21 RX ORDER — ACETAMINOPHEN 325 MG/1
650 TABLET ORAL ONCE
Status: COMPLETED | OUTPATIENT
Start: 2019-10-21 | End: 2019-10-21

## 2019-10-21 RX ORDER — SODIUM CHLORIDE 9 MG/ML
INJECTION, SOLUTION INTRAVENOUS CONTINUOUS
Status: CANCELLED | OUTPATIENT
Start: 2019-10-28

## 2019-10-21 RX ORDER — SODIUM CHLORIDE 9 MG/ML
INJECTION, SOLUTION INTRAVENOUS CONTINUOUS
Status: ACTIVE | OUTPATIENT
Start: 2019-10-21 | End: 2019-10-21

## 2019-10-21 RX ORDER — DIPHENHYDRAMINE HYDROCHLORIDE 50 MG/ML
25 INJECTION INTRAMUSCULAR; INTRAVENOUS ONCE
Status: COMPLETED | OUTPATIENT
Start: 2019-10-21 | End: 2019-10-21

## 2019-10-21 RX ORDER — ACETAMINOPHEN 325 MG/1
650 TABLET ORAL ONCE
Status: CANCELLED | OUTPATIENT
Start: 2019-10-28

## 2019-10-21 RX ORDER — DIPHENHYDRAMINE HYDROCHLORIDE 50 MG/ML
25 INJECTION INTRAMUSCULAR; INTRAVENOUS ONCE
Status: CANCELLED | OUTPATIENT
Start: 2019-10-28

## 2019-10-21 RX ADMIN — ACETAMINOPHEN 650 MG: 325 TABLET ORAL at 11:07

## 2019-10-21 RX ADMIN — DIPHENHYDRAMINE HYDROCHLORIDE 25 MG: 50 INJECTION, SOLUTION INTRAMUSCULAR; INTRAVENOUS at 11:09

## 2019-10-21 RX ADMIN — RITUXIMAB 790 MG: 10 INJECTION, SOLUTION INTRAVENOUS at 11:20

## 2019-10-21 RX ADMIN — SODIUM CHLORIDE: 9 INJECTION, SOLUTION INTRAVENOUS at 10:55

## 2019-10-21 ASSESSMENT — PAIN SCALES - GENERAL: PAINLEVEL_OUTOF10: 0

## 2019-10-21 NOTE — PROGRESS NOTES
Patient assessed for the following post chemotherapy:    Dizziness   No  Lightheadedness  No     Acute nausea/vomiting No  Headache   No  Chest pain/pressure  No  Rash/itching   No  Shortness of breath  No    Patient tolerated chemotherapy treatment rituxan without any complications. Last vital signs:   /60   Pulse 89   Temp 98.5 °F (36.9 °C) (Oral)   Resp 18   Ht 5' 5\" (1.651 m)   Wt 219 lb 6.4 oz (99.5 kg)   SpO2 98%   BMI 36.51 kg/m²     Patient instructed if experience any of the above symptoms following today's infusion,he/she is to notify MD immediately or go to the emergency department. Discharge instructions given to patient. Verbalizes understanding. Ambulated off unit per self with spouse with belongings.

## 2019-10-21 NOTE — PLAN OF CARE
Problem: Musculor/Skeletal Functional Status  Goal: Absence of falls  Outcome: Met This Shift  Note:   Free from falls while in [facility]. Intervention: Fall precautions  Note:   Discussed the need to use the call light for assistance when getting up to ambulate. Call light within reach. Problem: Intellectual/Education/Knowledge Deficit  Goal: Teaching initiated upon admission  Outcome: Met This Shift  Note:   Patient verbalizes understanding to verbal information given on rituxan,action and possible side effects. Aware to call MD if develop complications. Goal: Written Disposition Instruction form completed  Outcome: Met This Shift  Intervention: Verbal/written education provided  Note:   Discuss understanding to verbal information given on rituxan infusion. Problem: Discharge Planning  Goal: Knowledge of discharge instructions  Description  Knowledge of discharge instructions     Outcome: Met This Shift  Note:   Verbalize understanding of discharge instructions, follow up appointments, and when to call Physician. Intervention: Interaction with patient/family and care team  Note:   Provide discharge instructions. Care plan reviewed with patient and spouse. Patient and spouse verbalize understanding of the plan of care and contribute to goal setting.

## 2019-10-21 NOTE — ONCOLOGY
Chemotherapy Administration    Pre-assessment Data: Antineoplastic Agents  Other:   See toxicity flow sheet for assessment [x]     Physician Notification of Concerns Related to Chemotherapy Administration:   Physician Notified Kenyatta Ramos / Time of Notification      Interventions:   Lab work assessed  [x]   Height / Weight verified for dose [x]   Current MAR reviewed [x]   Emergency drugs available as appropriate [x]   Anaphylaxis assessment completed [x]   Pre-medications administered as ordered [x]   Blood return noted upon initiation of chemotherapy [x]   Blood return noted each 1-2ml of a vesicant medication if given IV push []   Blood return noted each 2-3ml of a non-vesicant medication if given IV push []   Monitor for signs / symptoms of hypersensitivity reaction [x]   Chemotherapy orders (drug/dose/rate) verified by 2 Chemo certified RNs [x]   Monitor IV site and blood return throughout the infusion of the medication [x]   Document IV site checks on the IV assessment form [x]   Document chemotherapy teaching on the Patient Education tab [x]   Document patient verbalizes understanding of medications being administered [x]   If IV infiltration, see ONS Guidelines []   Other:      []     Chemotherapy Administration        Rituxan Administration:  First Infusion:  initial infusion rate should be 50 mg/hr. If no infusion related problems, increase IV rate in 50mg/hr increments every 30 min. Maximum rate of infusion is 400mg/hr or as ordered by physician. Subsequent Infusions: initial rate of 100mg/hr, increase in 100mg/hr increments every 30 minutes. Maximum rate of infusion is 400mg/hr or as ordered by the physician. If hypersensitivity reaction occurs, STOP RITUXAN and maintain plain IV. Notify physician immediately for additional orders.    Pre-medication s administered as ordered [x]   Assess for history of angina or heart arrhythmias [x]   Hypertensive meds held for 12 hours prior to Rituxan administration [x]   Document blood pressure, pulse, respiratory rate at start of infusion [x]   Document blood pressure, pulse, respiratory rate every 30 min with each increase in IV rate [x]   With each increase in IV rate, document if a reaction is suspected [x]   Document blood pressure, pulse, respiratory rate at the completion of infusion [x]   Other:    []

## 2019-10-25 ENCOUNTER — HOSPITAL ENCOUNTER (OUTPATIENT)
Age: 54
Discharge: HOME | End: 2019-10-25
Payer: COMMERCIAL

## 2019-10-25 DIAGNOSIS — D59.10 AUTOIMMUNE HEMOLYTIC ANEMIA (HCC): Primary | ICD-10-CM

## 2019-10-25 DIAGNOSIS — D59.10 AUTOIMMUNE HEMOLYTIC ANEMIA (HCC): ICD-10-CM

## 2019-10-25 LAB
BASINOPHIL, AUTOMATED: 1 % (ref 0–3)
EOSINOPHILS RELATIVE PERCENT: 1 % (ref 0–4)
HCT VFR BLD CALC: 33 % (ref 37–47)
HEMOGLOBIN: 10.9 GM/DL (ref 12–16)
LYMPHOCYTES # BLD: 16 % (ref 15–47)
MCH RBC QN AUTO: 34 PG (ref 27–31)
MCHC RBC AUTO-ENTMCNC: 33.2 GM/DL (ref 33–37)
MCV RBC AUTO: 102 FL (ref 81–99)
MONOCYTES: 3 % (ref 0–12)
PDW BLD-RTO: 12.7 % (ref 11.5–14.5)
PLATELET # BLD: 394 THOU/MM3 (ref 130–400)
PMV BLD AUTO: 6.7 FL (ref 7.4–10.4)
RBC # BLD: 3.22 MILL/MM3 (ref 4.2–5.4)
SEG NEUTROPHILS: 79 % (ref 43–75)
WBC # BLD: 7.8 THOU/MM3 (ref 4.8–10.8)

## 2019-10-25 PROCEDURE — 85025 COMPLETE CBC W/AUTO DIFF WBC: CPT

## 2019-10-25 PROCEDURE — 36415 COLL VENOUS BLD VENIPUNCTURE: CPT

## 2019-10-28 ENCOUNTER — HOSPITAL ENCOUNTER (OUTPATIENT)
Dept: INFUSION THERAPY | Age: 54
Discharge: HOME | End: 2019-10-28
Payer: COMMERCIAL

## 2019-10-28 ENCOUNTER — OFFICE VISIT (OUTPATIENT)
Dept: ONCOLOGY | Age: 54
End: 2019-10-28
Payer: COMMERCIAL

## 2019-10-28 VITALS
RESPIRATION RATE: 16 BRPM | OXYGEN SATURATION: 99 % | BODY MASS INDEX: 35.42 KG/M2 | TEMPERATURE: 98.6 F | DIASTOLIC BLOOD PRESSURE: 66 MMHG | HEART RATE: 95 BPM | WEIGHT: 212.6 LBS | SYSTOLIC BLOOD PRESSURE: 118 MMHG | HEIGHT: 65 IN

## 2019-10-28 VITALS
HEIGHT: 65 IN | WEIGHT: 212.6 LBS | RESPIRATION RATE: 16 BRPM | OXYGEN SATURATION: 99 % | DIASTOLIC BLOOD PRESSURE: 62 MMHG | TEMPERATURE: 98.5 F | HEART RATE: 90 BPM | BODY MASS INDEX: 35.42 KG/M2 | SYSTOLIC BLOOD PRESSURE: 108 MMHG

## 2019-10-28 DIAGNOSIS — Z79.620 VISIT FOR MONITORING RITUXAN THERAPY: ICD-10-CM

## 2019-10-28 DIAGNOSIS — D59.10 AUTOIMMUNE HEMOLYTIC ANEMIA (HCC): ICD-10-CM

## 2019-10-28 DIAGNOSIS — Z79.52 ON PREDNISONE THERAPY: ICD-10-CM

## 2019-10-28 DIAGNOSIS — D59.10 AUTOIMMUNE HEMOLYTIC ANEMIA (HCC): Primary | ICD-10-CM

## 2019-10-28 DIAGNOSIS — D59.19 OTHER AUTOIMMUNE HEMOLYTIC ANEMIAS: Primary | ICD-10-CM

## 2019-10-28 DIAGNOSIS — Z51.81 VISIT FOR MONITORING RITUXAN THERAPY: ICD-10-CM

## 2019-10-28 LAB
BASINOPHIL, AUTOMATED: 1 % (ref 0–3)
EOSINOPHILS RELATIVE PERCENT: 1 % (ref 0–4)
HCT VFR BLD CALC: 30.5 % (ref 37–47)
HEMOGLOBIN: 10.4 GM/DL (ref 12–16)
LYMPHOCYTES # BLD: 18 % (ref 15–47)
MCH RBC QN AUTO: 34.8 PG (ref 27–31)
MCHC RBC AUTO-ENTMCNC: 34 GM/DL (ref 33–37)
MCV RBC AUTO: 102 FL (ref 81–99)
MONOCYTES: 6 % (ref 0–12)
PDW BLD-RTO: 12.7 % (ref 11.5–14.5)
PLATELET # BLD: 346 THOU/MM3 (ref 130–400)
PMV BLD AUTO: 6.5 FL (ref 7.4–10.4)
RBC # BLD: 2.98 MILL/MM3 (ref 4.2–5.4)
SEG NEUTROPHILS: 75 % (ref 43–75)
WBC # BLD: 6.9 THOU/MM3 (ref 4.8–10.8)

## 2019-10-28 PROCEDURE — 96415 CHEMO IV INFUSION ADDL HR: CPT

## 2019-10-28 PROCEDURE — 96375 TX/PRO/DX INJ NEW DRUG ADDON: CPT

## 2019-10-28 PROCEDURE — 36415 COLL VENOUS BLD VENIPUNCTURE: CPT

## 2019-10-28 PROCEDURE — 6360000002 HC RX W HCPCS: Performed by: INTERNAL MEDICINE

## 2019-10-28 PROCEDURE — 2580000003 HC RX 258: Performed by: INTERNAL MEDICINE

## 2019-10-28 PROCEDURE — 96413 CHEMO IV INFUSION 1 HR: CPT

## 2019-10-28 PROCEDURE — 6370000000 HC RX 637 (ALT 250 FOR IP): Performed by: INTERNAL MEDICINE

## 2019-10-28 PROCEDURE — 99211 OFF/OP EST MAY X REQ PHY/QHP: CPT

## 2019-10-28 PROCEDURE — 99214 OFFICE O/P EST MOD 30 MIN: CPT | Performed by: INTERNAL MEDICINE

## 2019-10-28 PROCEDURE — 85025 COMPLETE CBC W/AUTO DIFF WBC: CPT

## 2019-10-28 RX ORDER — OMEPRAZOLE 40 MG/1
40 CAPSULE, DELAYED RELEASE ORAL DAILY
Qty: 30 CAPSULE | Refills: 1 | Status: SHIPPED | OUTPATIENT
Start: 2019-10-28 | End: 2021-05-17

## 2019-10-28 RX ORDER — DIPHENHYDRAMINE HYDROCHLORIDE 50 MG/ML
25 INJECTION INTRAMUSCULAR; INTRAVENOUS ONCE
Status: COMPLETED | OUTPATIENT
Start: 2019-10-28 | End: 2019-10-28

## 2019-10-28 RX ORDER — ACETAMINOPHEN 325 MG/1
650 TABLET ORAL ONCE
Status: COMPLETED | OUTPATIENT
Start: 2019-10-28 | End: 2019-10-28

## 2019-10-28 RX ORDER — ACETAMINOPHEN 325 MG/1
650 TABLET ORAL ONCE
Status: CANCELLED | OUTPATIENT
Start: 2019-10-28

## 2019-10-28 RX ORDER — PREDNISONE 20 MG/1
40 TABLET ORAL 2 TIMES DAILY
Qty: 126 TABLET | Refills: 0 | Status: SHIPPED | OUTPATIENT
Start: 2019-10-28 | End: 2019-11-22 | Stop reason: SDUPTHER

## 2019-10-28 RX ORDER — SODIUM CHLORIDE 9 MG/ML
INJECTION, SOLUTION INTRAVENOUS CONTINUOUS
Status: ACTIVE | OUTPATIENT
Start: 2019-10-28 | End: 2019-10-28

## 2019-10-28 RX ORDER — DIPHENHYDRAMINE HYDROCHLORIDE 50 MG/ML
25 INJECTION INTRAMUSCULAR; INTRAVENOUS ONCE
Status: CANCELLED | OUTPATIENT
Start: 2019-10-28

## 2019-10-28 RX ORDER — SODIUM CHLORIDE 9 MG/ML
INJECTION, SOLUTION INTRAVENOUS CONTINUOUS
Status: CANCELLED | OUTPATIENT
Start: 2019-10-28

## 2019-10-28 RX ADMIN — ACETAMINOPHEN 650 MG: 325 TABLET ORAL at 11:22

## 2019-10-28 RX ADMIN — DIPHENHYDRAMINE HYDROCHLORIDE 25 MG: 50 INJECTION, SOLUTION INTRAMUSCULAR; INTRAVENOUS at 11:23

## 2019-10-28 RX ADMIN — RITUXIMAB 800 MG: 10 INJECTION, SOLUTION INTRAVENOUS at 11:40

## 2019-10-28 RX ADMIN — SODIUM CHLORIDE: 9 INJECTION, SOLUTION INTRAVENOUS at 11:19

## 2019-10-28 ASSESSMENT — ENCOUNTER SYMPTOMS
BLOOD IN STOOL: 0
ABDOMINAL PAIN: 0
RECTAL PAIN: 0
VOMITING: 0
NAUSEA: 0
DIARRHEA: 0
CHEST TIGHTNESS: 0
SHORTNESS OF BREATH: 1
WHEEZING: 0
ABDOMINAL DISTENTION: 0
FACIAL SWELLING: 0
TROUBLE SWALLOWING: 0
DIARRHEA: 0
WHEEZING: 0
SHORTNESS OF BREATH: 1
EYE DISCHARGE: 0
FACIAL SWELLING: 0
BACK PAIN: 1
CONSTIPATION: 0
BLOOD IN STOOL: 0
COLOR CHANGE: 0
COLOR CHANGE: 0
BACK PAIN: 1
NAUSEA: 0
FACIAL SWELLING: 0
ABDOMINAL DISTENTION: 0
RECTAL PAIN: 0
COLOR CHANGE: 0
CHEST TIGHTNESS: 0
SORE THROAT: 0
BLOOD IN STOOL: 0
ABDOMINAL PAIN: 0
SORE THROAT: 0
VOMITING: 0
COUGH: 0
VOMITING: 0
TROUBLE SWALLOWING: 0
SORE THROAT: 0
DIARRHEA: 0
BACK PAIN: 1
CONSTIPATION: 0
COUGH: 0
RECTAL PAIN: 0
ABDOMINAL PAIN: 0
EYE DISCHARGE: 0
ABDOMINAL DISTENTION: 0
NAUSEA: 0
WHEEZING: 0
CONSTIPATION: 0
SHORTNESS OF BREATH: 1
TROUBLE SWALLOWING: 0
CHEST TIGHTNESS: 0
COUGH: 0
EYE DISCHARGE: 0

## 2019-10-28 ASSESSMENT — PAIN SCALES - GENERAL: PAINLEVEL_OUTOF10: 0

## 2019-10-28 NOTE — ONCOLOGY
Chemotherapy Administration    Pre-assessment Data: Antineoplastic Agents  Other:   See toxicity flow sheet for assessment [x]     Physician Notification of Concerns Related to Chemotherapy Administration:   Physician Notified Gerry Anderson / Time of Notification Dr Sathish Khalil seen today. Interventions:   Lab work assessed  [x]   Height / Weight verified for dose [x]   Current MAR reviewed [x]   Emergency drugs available as appropriate [x]   Anaphylaxis assessment completed [x]   Pre-medications administered as ordered [x]   Blood return noted upon initiation of chemotherapy [x]   Blood return noted each 1-2ml of a vesicant medication if given IV push []   Blood return noted each 2-3ml of a non-vesicant medication if given IV push []   Monitor for signs / symptoms of hypersensitivity reaction [x]   Chemotherapy orders (drug/dose/rate) verified by 2 Chemo certified RNs [x]   Monitor IV site and blood return throughout the infusion of the medication [x]   Document IV site checks on the IV assessment form [x]   Document chemotherapy teaching on the Patient Education tab [x]   Document patient verbalizes understanding of medications being administered [x]   If IV infiltration, see ONS Guidelines []   Other:      []         Rituxan Administration:  First Infusion:  initial infusion rate should be 50 mg/hr. If no infusion related problems, increase IV rate in 50mg/hr increments every 30 min. Maximum rate of infusion is 400mg/hr or as ordered by physician. Subsequent Infusions: initial rate of 100mg/hr, increase in 100mg/hr increments every 30 minutes. Maximum rate of infusion is 400mg/hr or as ordered by the physician. If hypersensitivity reaction occurs, STOP RITUXAN and maintain plain IV. Notify physician immediately for additional orders.    Pre-medication s administered as ordered [x]   Assess for history of angina or heart arrhythmias [x]   Hypertensive meds held for 12 hours prior to Rituxan administration [x] Document blood pressure, pulse, respiratory rate at start of infusion [x]   Document blood pressure, pulse, respiratory rate every 30 min with each increase in IV rate [x]   With each increase in IV rate, document if a reaction is suspected [x]   Document blood pressure, pulse, respiratory rate at the completion of infusion [x]   Other:    []

## 2019-10-28 NOTE — PLAN OF CARE
Problem: Musculor/Skeletal Functional Status  Goal: Absence of falls  Outcome: Met This Shift  Note:   Free from falls while in [facility]. Intervention: Fall precautions  Note:   Discussed the need to use the call light for assistance when getting up to ambulate. Call light within reach. Problem: Intellectual/Education/Knowledge Deficit  Goal: Teaching initiated upon admission  Outcome: Met This Shift  Note:   Patient verbalizes understanding to verbal information given on rituxan,action and possible side effects. Aware to call MD if develop complications. Goal: Written Disposition Instruction form completed  Outcome: Met This Shift  Intervention: Verbal/written education provided  Note:   Discuss understanding to verbal information given on rituxan infusion. Problem: Discharge Planning  Goal: Knowledge of discharge instructions  Description  Knowledge of discharge instructions     Outcome: Met This Shift  Note:   Verbalize understanding of discharge instructions, follow up appointments, and when to call Physician. Intervention: Interaction with patient/family and care team  Note:   Provide discharge instructions. Care plan reviewed with patient. Patient verbalize understanding of the plan of care and contribute to goal setting.

## 2019-10-28 NOTE — PROGRESS NOTES
Patient assessed for the following post chemotherapy:    Dizziness   No  Lightheadedness  No     Acute nausea/vomiting No  Headache   No  Chest pain/pressure  No  Rash/itching   No  Shortness of breath  No    Patient tolerated chemotherapy treatment rituxan without any complications. Last vital signs:   /62   Pulse 90   Temp 98.5 °F (36.9 °C) (Oral)   Resp 16   Ht 5' 5\" (1.651 m)   Wt 212 lb 9.6 oz (96.4 kg)   SpO2 99%   BMI 35.38 kg/m²     Patient instructed if experience any of the above symptoms following today's infusion,he/she is to notify MD immediately or go to the emergency department. Discharge instructions given to patient. Verbalizes understanding. Ambulated off unit per self with belongings.

## 2019-10-30 ENCOUNTER — HOSPITAL ENCOUNTER (OUTPATIENT)
Dept: WOMENS IMAGING | Age: 54
Discharge: HOME | End: 2019-10-30
Payer: COMMERCIAL

## 2019-10-30 DIAGNOSIS — Z12.31 VISIT FOR SCREENING MAMMOGRAM: ICD-10-CM

## 2019-10-30 PROCEDURE — 77063 BREAST TOMOSYNTHESIS BI: CPT

## 2019-11-05 DIAGNOSIS — D59.10 AUTOIMMUNE HEMOLYTIC ANEMIA (HCC): Primary | ICD-10-CM

## 2019-11-06 ENCOUNTER — HOSPITAL ENCOUNTER (OUTPATIENT)
Dept: INFUSION THERAPY | Age: 54
Discharge: HOME | End: 2019-11-06
Payer: COMMERCIAL

## 2019-11-06 ENCOUNTER — OFFICE VISIT (OUTPATIENT)
Dept: ONCOLOGY | Age: 54
End: 2019-11-06
Payer: COMMERCIAL

## 2019-11-06 VITALS
RESPIRATION RATE: 16 BRPM | HEIGHT: 65 IN | DIASTOLIC BLOOD PRESSURE: 82 MMHG | SYSTOLIC BLOOD PRESSURE: 128 MMHG | OXYGEN SATURATION: 99 % | BODY MASS INDEX: 36.12 KG/M2 | WEIGHT: 216.8 LBS | TEMPERATURE: 97.9 F | HEART RATE: 91 BPM

## 2019-11-06 DIAGNOSIS — Z79.52 ON PREDNISONE THERAPY: ICD-10-CM

## 2019-11-06 DIAGNOSIS — D59.10 AUTOIMMUNE HEMOLYTIC ANEMIA (HCC): ICD-10-CM

## 2019-11-06 DIAGNOSIS — D59.10 AUTOIMMUNE HEMOLYTIC ANEMIA (HCC): Primary | ICD-10-CM

## 2019-11-06 LAB
ABSOLUTE RETIC #: 95 THOU/MM3 (ref 20–115)
ALBUMIN SERPL-MCNC: 4.4 G/DL (ref 3.5–5.1)
ALP BLD-CCNC: 64 U/L (ref 38–126)
ALT SERPL-CCNC: 72 U/L (ref 11–66)
AST SERPL-CCNC: 36 U/L (ref 5–40)
BASINOPHIL, AUTOMATED: 0 % (ref 0–3)
BILIRUB SERPL-MCNC: 1.3 MG/DL (ref 0.3–1.2)
BILIRUBIN DIRECT: 0.3 MG/DL (ref 0–0.3)
BUN, WHOLE BLOOD: 16 MG/DL (ref 8–26)
CHLORIDE, WHOLE BLOOD: 97 MEQ/L (ref 98–109)
CREATININE, WHOLE BLOOD: 0.9 MG/DL (ref 0.5–1.2)
EOSINOPHILS RELATIVE PERCENT: 1 % (ref 0–4)
GFR, ESTIMATED: 70 ML/MIN/1.73M2
GLUCOSE, WHOLE BLOOD: 254 MG/DL (ref 70–108)
HCT VFR BLD CALC: 32.5 % (ref 37–47)
HEMOGLOBIN: 10.7 GM/DL (ref 12–16)
IMMATURE RETIC FRACT: 53.7 % (ref 3–15.9)
IONIZED CALCIUM, WHOLE BLOOD: 1.24 MMOL/L (ref 1.12–1.32)
LD: 264 U/L (ref 100–190)
LYMPHOCYTES # BLD: 32 % (ref 15–47)
MCH RBC QN AUTO: 33.6 PG (ref 27–31)
MCHC RBC AUTO-ENTMCNC: 32.8 GM/DL (ref 33–37)
MCV RBC AUTO: 102 FL (ref 81–99)
MONOCYTES: 9 % (ref 0–12)
PDW BLD-RTO: 12.2 % (ref 11.5–14.5)
PLATELET # BLD: 297 THOU/MM3 (ref 130–400)
PMV BLD AUTO: 6.5 FL (ref 7.4–10.4)
POTASSIUM, WHOLE BLOOD: 3.6 MEQ/L (ref 3.5–4.9)
RBC # BLD: 3.18 MILL/MM3 (ref 4.2–5.4)
RETIC HEMOGLOBIN: 37.2 PG (ref 28.2–35.7)
RETICULOCYTE ABSOLUTE COUNT: 3.1 % (ref 0.5–2)
SEG NEUTROPHILS: 58 % (ref 43–75)
SODIUM, WHOLE BLOOD: 137 MEQ/L (ref 138–146)
TOTAL CO2, WHOLE BLOOD: 30 MEQ/L (ref 23–33)
TOTAL PROTEIN: 7.1 G/DL (ref 6.1–8)
WBC # BLD: 5.9 THOU/MM3 (ref 4.8–10.8)

## 2019-11-06 PROCEDURE — 80047 BASIC METABLC PNL IONIZED CA: CPT

## 2019-11-06 PROCEDURE — 83615 LACTATE (LD) (LDH) ENZYME: CPT

## 2019-11-06 PROCEDURE — 80076 HEPATIC FUNCTION PANEL: CPT

## 2019-11-06 PROCEDURE — 85046 RETICYTE/HGB CONCENTRATE: CPT

## 2019-11-06 PROCEDURE — 99211 OFF/OP EST MAY X REQ PHY/QHP: CPT

## 2019-11-06 PROCEDURE — 99214 OFFICE O/P EST MOD 30 MIN: CPT | Performed by: INTERNAL MEDICINE

## 2019-11-06 PROCEDURE — 36415 COLL VENOUS BLD VENIPUNCTURE: CPT

## 2019-11-06 PROCEDURE — 85025 COMPLETE CBC W/AUTO DIFF WBC: CPT

## 2019-11-06 ASSESSMENT — ENCOUNTER SYMPTOMS
VOMITING: 0
FACIAL SWELLING: 0
BACK PAIN: 1
ABDOMINAL DISTENTION: 0
ABDOMINAL PAIN: 0
RECTAL PAIN: 0
SORE THROAT: 0
CHEST TIGHTNESS: 0
SHORTNESS OF BREATH: 1
COLOR CHANGE: 0
COUGH: 0
CONSTIPATION: 0
WHEEZING: 0
BLOOD IN STOOL: 0
DIARRHEA: 0
EYE DISCHARGE: 0
NAUSEA: 0
TROUBLE SWALLOWING: 0

## 2019-11-22 ENCOUNTER — OFFICE VISIT (OUTPATIENT)
Dept: ONCOLOGY | Age: 54
End: 2019-11-22
Payer: COMMERCIAL

## 2019-11-22 ENCOUNTER — HOSPITAL ENCOUNTER (OUTPATIENT)
Dept: INFUSION THERAPY | Age: 54
Discharge: HOME | End: 2019-11-22
Payer: COMMERCIAL

## 2019-11-22 VITALS
HEART RATE: 82 BPM | OXYGEN SATURATION: 100 % | BODY MASS INDEX: 36.62 KG/M2 | SYSTOLIC BLOOD PRESSURE: 124 MMHG | RESPIRATION RATE: 16 BRPM | HEIGHT: 65 IN | WEIGHT: 219.8 LBS | DIASTOLIC BLOOD PRESSURE: 81 MMHG | TEMPERATURE: 99.3 F

## 2019-11-22 DIAGNOSIS — D59.10 AUTOIMMUNE HEMOLYTIC ANEMIA (HCC): Primary | ICD-10-CM

## 2019-11-22 DIAGNOSIS — Z79.52 ENCOUNTER FOR MONITORING OF SYSTEMIC STEROID THERAPY: ICD-10-CM

## 2019-11-22 DIAGNOSIS — Z51.81 ENCOUNTER FOR MONITORING OF SYSTEMIC STEROID THERAPY: ICD-10-CM

## 2019-11-22 DIAGNOSIS — E11.9 TYPE 2 DIABETES MELLITUS TREATED WITHOUT INSULIN (HCC): ICD-10-CM

## 2019-11-22 DIAGNOSIS — D59.10 AUTOIMMUNE HEMOLYTIC ANEMIA (HCC): ICD-10-CM

## 2019-11-22 LAB
ABSOLUTE RETIC #: 130 THOU/MM3 (ref 20–115)
ALBUMIN SERPL-MCNC: 4.3 G/DL (ref 3.5–5.1)
ALP BLD-CCNC: 66 U/L (ref 38–126)
ALT SERPL-CCNC: 73 U/L (ref 11–66)
AST SERPL-CCNC: 34 U/L (ref 5–40)
BASINOPHIL, AUTOMATED: 0 % (ref 0–3)
BILIRUB SERPL-MCNC: 1.3 MG/DL (ref 0.3–1.2)
BILIRUBIN DIRECT: 0.3 MG/DL (ref 0–0.3)
EOSINOPHILS RELATIVE PERCENT: 1 % (ref 0–4)
HCT VFR BLD CALC: 31 % (ref 37–47)
HEMOGLOBIN: 10.4 GM/DL (ref 12–16)
IMMATURE RETIC FRACT: 50.8 % (ref 3–15.9)
LD: 262 U/L (ref 100–190)
LYMPHOCYTES # BLD: 21 % (ref 15–47)
MCH RBC QN AUTO: 34.4 PG (ref 27–31)
MCHC RBC AUTO-ENTMCNC: 33.4 GM/DL (ref 33–37)
MCV RBC AUTO: 103 FL (ref 81–99)
MONOCYTES: 7 % (ref 0–12)
PDW BLD-RTO: 11.2 % (ref 11.5–14.5)
PLATELET # BLD: 348 THOU/MM3 (ref 130–400)
PMV BLD AUTO: 6.8 FL (ref 7.4–10.4)
RBC # BLD: 3.01 MILL/MM3 (ref 4.2–5.4)
RETIC HEMOGLOBIN: 35.8 PG (ref 28.2–35.7)
RETICULOCYTE ABSOLUTE COUNT: 4.3 % (ref 0.5–2)
SEG NEUTROPHILS: 71 % (ref 43–75)
TOTAL PROTEIN: 6.8 G/DL (ref 6.1–8)
WBC # BLD: 7.2 THOU/MM3 (ref 4.8–10.8)

## 2019-11-22 PROCEDURE — 99211 OFF/OP EST MAY X REQ PHY/QHP: CPT

## 2019-11-22 PROCEDURE — 99214 OFFICE O/P EST MOD 30 MIN: CPT | Performed by: INTERNAL MEDICINE

## 2019-11-22 PROCEDURE — 80076 HEPATIC FUNCTION PANEL: CPT

## 2019-11-22 PROCEDURE — 36415 COLL VENOUS BLD VENIPUNCTURE: CPT

## 2019-11-22 PROCEDURE — 83615 LACTATE (LD) (LDH) ENZYME: CPT

## 2019-11-22 PROCEDURE — 85025 COMPLETE CBC W/AUTO DIFF WBC: CPT

## 2019-11-22 PROCEDURE — 85046 RETICYTE/HGB CONCENTRATE: CPT

## 2019-11-22 RX ORDER — PREDNISONE 20 MG/1
20 TABLET ORAL DAILY
Qty: 30 TABLET | Refills: 1 | Status: SHIPPED | OUTPATIENT
Start: 2019-11-22 | End: 2019-12-16 | Stop reason: DRUGHIGH

## 2019-12-08 ASSESSMENT — ENCOUNTER SYMPTOMS
RECTAL PAIN: 0
EYE DISCHARGE: 0
NAUSEA: 0
WHEEZING: 0
TROUBLE SWALLOWING: 0
VOMITING: 0
DIARRHEA: 0
CONSTIPATION: 0
FACIAL SWELLING: 0
BACK PAIN: 1
COLOR CHANGE: 0
COUGH: 0
ABDOMINAL PAIN: 0
CHEST TIGHTNESS: 0
SORE THROAT: 0
SHORTNESS OF BREATH: 1
BLOOD IN STOOL: 0
ABDOMINAL DISTENTION: 0

## 2019-12-16 ENCOUNTER — HOSPITAL ENCOUNTER (OUTPATIENT)
Dept: INFUSION THERAPY | Age: 54
Discharge: HOME | End: 2019-12-16
Payer: COMMERCIAL

## 2019-12-16 ENCOUNTER — OFFICE VISIT (OUTPATIENT)
Dept: ONCOLOGY | Age: 54
End: 2019-12-16
Payer: COMMERCIAL

## 2019-12-16 VITALS
BODY MASS INDEX: 37.52 KG/M2 | WEIGHT: 225.2 LBS | SYSTOLIC BLOOD PRESSURE: 128 MMHG | OXYGEN SATURATION: 99 % | RESPIRATION RATE: 18 BRPM | HEART RATE: 93 BPM | DIASTOLIC BLOOD PRESSURE: 63 MMHG | TEMPERATURE: 98.3 F | HEIGHT: 65 IN

## 2019-12-16 DIAGNOSIS — D59.10 AUTOIMMUNE HEMOLYTIC ANEMIA (HCC): Primary | ICD-10-CM

## 2019-12-16 DIAGNOSIS — D59.10 AUTOIMMUNE HEMOLYTIC ANEMIA (HCC): ICD-10-CM

## 2019-12-16 DIAGNOSIS — Z79.52 ON PREDNISONE THERAPY: ICD-10-CM

## 2019-12-16 DIAGNOSIS — E11.9 TYPE 2 DIABETES MELLITUS TREATED WITHOUT INSULIN (HCC): ICD-10-CM

## 2019-12-16 LAB
BASINOPHIL, AUTOMATED: 1 % (ref 0–3)
EOSINOPHILS RELATIVE PERCENT: 3 % (ref 0–4)
HCT VFR BLD CALC: 33.3 % (ref 37–47)
HEMOGLOBIN: 11.4 GM/DL (ref 12–16)
LYMPHOCYTES # BLD: 30 % (ref 15–47)
MCH RBC QN AUTO: 34.3 PG (ref 27–31)
MCHC RBC AUTO-ENTMCNC: 34.1 GM/DL (ref 33–37)
MCV RBC AUTO: 101 FL (ref 81–99)
MONOCYTES: 8 % (ref 0–12)
PDW BLD-RTO: 10 % (ref 11.5–14.5)
PLATELET # BLD: 319 THOU/MM3 (ref 130–400)
PMV BLD AUTO: 7.4 FL (ref 7.4–10.4)
RBC # BLD: 3.31 MILL/MM3 (ref 4.2–5.4)
SEG NEUTROPHILS: 58 % (ref 43–75)
WBC # BLD: 6.9 THOU/MM3 (ref 4.8–10.8)

## 2019-12-16 PROCEDURE — 99214 OFFICE O/P EST MOD 30 MIN: CPT | Performed by: INTERNAL MEDICINE

## 2019-12-16 PROCEDURE — 36415 COLL VENOUS BLD VENIPUNCTURE: CPT

## 2019-12-16 PROCEDURE — 99211 OFF/OP EST MAY X REQ PHY/QHP: CPT

## 2019-12-16 PROCEDURE — 85025 COMPLETE CBC W/AUTO DIFF WBC: CPT

## 2019-12-16 RX ORDER — PREDNISONE 10 MG/1
10 TABLET ORAL DAILY
Qty: 30 TABLET | Refills: 1 | Status: SHIPPED | OUTPATIENT
Start: 2019-12-16 | End: 2020-01-15

## 2019-12-16 ASSESSMENT — ENCOUNTER SYMPTOMS
TROUBLE SWALLOWING: 0
ABDOMINAL PAIN: 0
COUGH: 0
COLOR CHANGE: 0
NAUSEA: 0
DIARRHEA: 0
VOMITING: 0
BACK PAIN: 1
SHORTNESS OF BREATH: 1
SORE THROAT: 0
CONSTIPATION: 0
ABDOMINAL DISTENTION: 0
RECTAL PAIN: 0
EYE DISCHARGE: 0
BLOOD IN STOOL: 0
FACIAL SWELLING: 0
WHEEZING: 0
CHEST TIGHTNESS: 0

## 2020-01-15 ENCOUNTER — OFFICE VISIT (OUTPATIENT)
Dept: ONCOLOGY | Age: 55
End: 2020-01-15
Payer: COMMERCIAL

## 2020-01-15 ENCOUNTER — HOSPITAL ENCOUNTER (OUTPATIENT)
Dept: INFUSION THERAPY | Age: 55
Discharge: HOME | End: 2020-01-15
Payer: COMMERCIAL

## 2020-01-15 VITALS
DIASTOLIC BLOOD PRESSURE: 72 MMHG | HEIGHT: 65 IN | BODY MASS INDEX: 37.29 KG/M2 | OXYGEN SATURATION: 96 % | HEART RATE: 87 BPM | WEIGHT: 223.8 LBS | TEMPERATURE: 97.5 F | SYSTOLIC BLOOD PRESSURE: 133 MMHG | RESPIRATION RATE: 18 BRPM

## 2020-01-15 DIAGNOSIS — D59.10 AUTOIMMUNE HEMOLYTIC ANEMIA (HCC): ICD-10-CM

## 2020-01-15 LAB
ABSOLUTE RETIC #: 112 THOU/MM3 (ref 20–115)
ALBUMIN SERPL-MCNC: 4.7 G/DL (ref 3.5–5.1)
ALP BLD-CCNC: 69 U/L (ref 38–126)
ALT SERPL-CCNC: 73 U/L (ref 11–66)
AST SERPL-CCNC: 42 U/L (ref 5–40)
BILIRUB SERPL-MCNC: 0.8 MG/DL (ref 0.3–1.2)
BILIRUBIN DIRECT: < 0.2 MG/DL (ref 0–0.3)
HCT VFR BLD CALC: 39 % (ref 37–47)
HEMOGLOBIN: 13.3 GM/DL (ref 12–16)
IMMATURE RETIC FRACT: 21 % (ref 3–15.9)
LD: 213 U/L (ref 100–190)
MCH RBC QN AUTO: 32.5 PG (ref 27–31)
MCHC RBC AUTO-ENTMCNC: 34.1 GM/DL (ref 33–37)
MCV RBC AUTO: 95 FL (ref 81–99)
PDW BLD-RTO: 11.1 % (ref 11.5–14.5)
PLATELET # BLD: 322 THOU/MM3 (ref 130–400)
PMV BLD AUTO: 7.6 FL (ref 7.4–10.4)
RBC # BLD: 4.09 MILL/MM3 (ref 4.2–5.4)
RETIC HEMOGLOBIN: 39.2 PG (ref 28.2–35.7)
RETICULOCYTE ABSOLUTE COUNT: 2.7 % (ref 0.5–2)
SEG NEUTROPHILS: 75 % (ref 43–75)
SEGMENTED NEUTROPHILS ABSOLUTE COUNT: 4.5 THOU/MM3 (ref 1.8–7.7)
TOTAL PROTEIN: 7.6 G/DL (ref 6.1–8)
WBC # BLD: 6 THOU/MM3 (ref 4.8–10.8)

## 2020-01-15 PROCEDURE — 85046 RETICYTE/HGB CONCENTRATE: CPT

## 2020-01-15 PROCEDURE — 99214 OFFICE O/P EST MOD 30 MIN: CPT | Performed by: INTERNAL MEDICINE

## 2020-01-15 PROCEDURE — 83615 LACTATE (LD) (LDH) ENZYME: CPT

## 2020-01-15 PROCEDURE — 36415 COLL VENOUS BLD VENIPUNCTURE: CPT

## 2020-01-15 PROCEDURE — 85027 COMPLETE CBC AUTOMATED: CPT

## 2020-01-15 PROCEDURE — 99211 OFF/OP EST MAY X REQ PHY/QHP: CPT

## 2020-01-15 PROCEDURE — 80076 HEPATIC FUNCTION PANEL: CPT

## 2020-01-15 RX ORDER — PREDNISONE 1 MG/1
5 TABLET ORAL EVERY OTHER DAY
Qty: 7 TABLET | Refills: 1 | Status: SHIPPED | OUTPATIENT
Start: 2020-01-15 | End: 2020-01-22

## 2020-01-15 ASSESSMENT — ENCOUNTER SYMPTOMS
FACIAL SWELLING: 0
VOMITING: 0
DIARRHEA: 0
NAUSEA: 0
BLOOD IN STOOL: 0
CONSTIPATION: 0
CHEST TIGHTNESS: 0
COLOR CHANGE: 0
SORE THROAT: 0
RECTAL PAIN: 0
BACK PAIN: 1
ABDOMINAL DISTENTION: 0
TROUBLE SWALLOWING: 0
COUGH: 0
WHEEZING: 0
SHORTNESS OF BREATH: 1
ABDOMINAL PAIN: 0
EYE DISCHARGE: 0

## 2020-01-15 NOTE — PROGRESS NOTES
count 158. She had elevated bilirubin. The patient underwent a esophagogastroduodenoscopy by Dr. Bartolome Davenport that showed a distal esophagitis/dissect gastritis. She was started on high dose prednisone 60 mg bid on 9/7,  repeat hgb stable 7.3 on 9/8 pm. from 7.6. The patient started weekly Rituxan on October 7, 2019, last dose on October 28, 2019. Overall she tolerates it well. Interim history on 01/15/2020: This is 1 months follow-up visit. The patient is currently on prednisone 10 mg daily . She reports improved fatigue and less dyspnea on exertion. She reports that with lowering the dose of prednisone her blood sugar is much better controlled  Denies having any oral thrush, no fevers. Denies any active bleeding.      HPI   Past Medical History:   Diagnosis Date    Chronic back pain     Depression 3/24/2013    Diabetes mellitus (HCC)     GERD (gastroesophageal reflux disease)     Hypertension     Obesity 3/24/2013    HENRY on CPAP     Osteoarthritis     PONV (postoperative nausea and vomiting)     Prolonged emergence from general anesthesia     Pyelonephritis 3/24/2013    Unspecified sleep apnea     with C-Pap    UTI (urinary tract infection)       Past Surgical History:   Procedure Laterality Date    ANTERIOR CRUCIATE LIGAMENT REPAIR Left 2002    BACK SURGERY      lumbar    BACK SURGERY  05/01/2018    herniated disk     BREAST SURGERY      left bx--marker    CHOLECYSTECTOMY, LAPAROSCOPIC N/A 6/18/2019    LAPARASCOPIC CHOLECYSTECTOMY performed by Salome Masterson MD at [address]    Left wrist, hand and ankle    DILATATION, ESOPHAGUS      ENDOMETRIAL ABLATION  2008    Dr Rick Florentino, TOTAL ABDOMINAL  11/09/2016    Robotic    LOBECTOMY, THYROID  5/21/10    right lobectomy    NASAL SINUS SURGERY  11/2017    Dr. Charlene Rodriguez in [facility] N/A 2/22/2019    T7-8 SPINAL CORD STIMULATOR IMPLANT performed by Briseida Hazel Rony Lara MD at [facility] N/A 3/21/2019    THORACIC I&D WITH SPINAL STIMULATOR AND BATTERY REMOVAL performed by [address], MD at [facility] N/A 8/9/2019    SPINAL CORD STIMULATOR INSERTION performed by [address], MD at [address]  2/28/13    c5, c6, c7    SPINAL FUSION  10/03/2017    UPPER GASTROINTESTINAL ENDOSCOPY N/A 9/6/2019    EGD BIOPSY performed by Mitzi Rubinstein, MD at [facility]      Family History   Problem Relation Age of Onset    Arthritis Mother     Cancer Mother     High Blood Pressure Mother     Stroke Mother     Heart Attack Maternal Grandmother     Heart Attack Maternal Grandfather     Early Death Father 25        [de-identified]    Diabetes Neg Hx     Heart Disease Neg Hx       Social History     Tobacco Use    Smoking status: Never Smoker    Smokeless tobacco: Never Used   Substance Use Topics    Alcohol use: Yes     Alcohol/week: 0.0 standard drinks     Comment: very rare      Current Outpatient Medications   Medication Sig Dispense Refill    omeprazole (PRILOSEC) 40 MG delayed release capsule Take 1 capsule by mouth daily 30 capsule 1    traZODone (DESYREL) 50 MG tablet Take 1 tablet by mouth nightly as needed for Sleep 30 tablet 0    insulin lispro (HUMALOG KWIKPEN) 200 UNIT/ML SOPN pen Glucose: Dose:  No Insulin 140-199 1 Unit 200-249 2 Units 250-299 3 Units 300-349 4 Units 350-399 5 Units Over 399 6 Units 2 pen 1    blood glucose monitor strips Test 3 times a day & as needed for symptoms of irregular blood glucose. 120 strip 0    Insulin Pen Needle (B-D UF III MINI PEN NEEDLES) 31G X 5 MM MISC 1 each by Does not apply route daily 100 each 3    nystatin (MYCOSTATIN) 514661 UNIT/ML suspension Take 5 mLs by mouth 4 times daily Swish about the mouth and retain in the mouth for as long as possible before swallowing.  473 mL 1    spironolactone-hydrochlorothiazide (ALDACTAZIDE) 25-25 MG per tablet HOLD until seen by PCP and Dr. Caesar Bai this week -- take 1 tablet by mouth once daily 90 tablet 1    buPROPion (WELLBUTRIN SR) 150 MG extended release tablet Take 150 mg by mouth 2 times daily      metoprolol succinate (TOPROL XL) 25 MG extended release tablet Take 1 tablet by mouth daily 90 tablet 1    CPAP Machine MISC by Does not apply route Please change CPAP pressure to 12 cm H20. 1 each 0     No current facility-administered medications for this visit. Allergies   Allergen Reactions    Contrast [Iodides] Anaphylaxis     With IV contrast      Shellfish Allergy     Penicillin G     Ampicillin Rash    Vancomycin Itching, Rash and Other (See Comments)     Tingling in lips      Health Maintenance   Topic Date Due    Pneumococcal 0-64 years Vaccine (1 of 1 - PPSV23) 06/13/1972    Diabetic foot exam  06/13/1976    Diabetic retinal exam  06/13/1976    DTaP/Tdap/Td vaccine (1 - Tdap) 06/13/1977    Diabetic microalbuminuria test  06/13/1984    Hepatitis B vaccine (1 of 3 - Risk 3-dose series) 06/13/1985    Shingles Vaccine (1 of 2) 06/13/2016    Colon cancer screen colonoscopy  06/13/2016    Flu vaccine (1) 09/01/2019    A1C test (Diabetic or Prediabetic)  11/28/2019    Lipid screen  11/28/2019    Potassium monitoring  09/07/2020    Creatinine monitoring  09/07/2020    Breast cancer screen  10/30/2021    HIV screen  Completed        Subjective:   Review of Systems   Constitutional: Positive for fatigue. Negative for activity change, appetite change and fever. HENT: Negative for congestion, dental problem, facial swelling, hearing loss, mouth sores, nosebleeds, sore throat, tinnitus and trouble swallowing. Eyes: Negative for discharge and visual disturbance. Respiratory: Positive for shortness of breath. Negative for cough, chest tightness and wheezing. Cardiovascular: Negative for chest pain, palpitations and leg swelling.    Gastrointestinal: Negative for abdominal distention, abdominal pain, blood in stool, constipation, diarrhea, nausea, rectal pain and vomiting. Endocrine: Negative for cold intolerance, polydipsia and polyuria. Genitourinary: Negative for decreased urine volume, difficulty urinating, dysuria, flank pain, hematuria and urgency. Musculoskeletal: Positive for back pain. Negative for arthralgias, gait problem, joint swelling, myalgias and neck stiffness. Skin: Negative for color change, rash and wound. Neurological: Positive for weakness. Negative for dizziness, tremors, seizures, speech difficulty, light-headedness, numbness and headaches. Hematological: Negative for adenopathy. Does not bruise/bleed easily. Psychiatric/Behavioral: Negative for confusion and sleep disturbance. The patient is not nervous/anxious. Objective:   Physical Exam  Vitals signs reviewed. Constitutional:       General: She is not in acute distress. Appearance: She is well-developed. HENT:      Head: Normocephalic. Mouth/Throat:      Pharynx: No oropharyngeal exudate. Eyes:      General: No scleral icterus. Right eye: No discharge. Left eye: No discharge. Pupils: Pupils are equal, round, and reactive to light. Neck:      Musculoskeletal: Normal range of motion and neck supple. Thyroid: No thyromegaly. Vascular: No JVD. Trachea: No tracheal deviation. Cardiovascular:      Rate and Rhythm: Normal rate. Heart sounds: Normal heart sounds. No murmur. No friction rub. No gallop. Pulmonary:      Effort: Pulmonary effort is normal. No respiratory distress. Breath sounds: Normal breath sounds. No stridor. No wheezing or rales. Chest:      Chest wall: No tenderness. Abdominal:      General: Bowel sounds are normal. There is no distension. Palpations: Abdomen is soft. There is no mass. Tenderness: There is no tenderness. There is no rebound. Musculoskeletal: Normal range of motion. performed after the administration of 3.1 mCi of technetium 99m labeled MAA. FINDINGS: On the ventilation images, there is uniform distribution of tracer. Mild delayed washout. On the perfusion images, there is uniform distribution of tracer. No defects are noted. 1. Very low probability for pulmonary embolism. Final report electronically signed by Dr. Manuel Delgado on 9/5/2019 3:11 PM    Us Liver    Result Date: 9/6/2019  PROCEDURE: US LIVER CLINICAL INFORMATION: elevated liver enzymes . COMPARISON: No prior study. TECHNIQUE: Grayscale and color Doppler ultrasound FINDINGS: Liver is 17.1 cm in size and is diffusely increased echogenicity. There is a single subcentimeter sonolucent structure anterior left lobe. No color flow associated. No intrahepatic ductal dilatation. Color Doppler interrogation of the hepatic vasculature which includes the portal vein and branches, hepatic veins, hepatic artery and IVC show normal directional flow. Technical notes: Liver - L= 17.1 cm Common Duct - 2.7 mm     Diffusely increased liver echogenicity compatible with hepatic steatosis. **This report has been created using voice recognition software. It may contain minor errors which are inherent in voice recognition technology. ** Final report electronically signed by Dr. Lisa Dorsey on 9/6/2019 3:57 PM    Xr Chest Portable    Result Date: 8/23/2019  PROCEDURE: XR CHEST PORTABLE CLINICAL INFORMATION: fever. COMPARISON: March 26, 2019 TECHNIQUE: Portable chest. FINDINGS: Spinal stimulator device. Postsurgical changes lower cervical spine. Mild interstitial prominence which is nonspecific. No discrete lobar consolidation. Costophrenic recesses are preserved. No acute osseous findings. Cardiothymic silhouette is within normal limits. There is mild prominence of the interstitium which is nonspecific. Findings may be related to be just mild vascular crowding. Correlation with symptoms advised no discrete lobar consolidation.  **This report has been created using voice recognition software. It may contain minor errors which are inherent in voice recognition technology. ** Final report electronically signed by Dr. Vinita Nieves on 8/23/2019 9:07 PM    Lab Results   Component Value Date    WBC 6.0 01/15/2020    HGB 13.3 01/15/2020    HCT 39.0 01/15/2020    MCV 95 01/15/2020     01/15/2020       Chemistry        Component Value Date/Time     (L) 11/06/2019 1008     09/07/2019 0758    K 3.6 11/06/2019 1008    K 4.2 09/07/2019 0758     09/07/2019 0758    CO2 23 09/07/2019 0758    BUN 13 09/07/2019 0758    CREATININE 0.9 11/06/2019 1008    CREATININE 0.8 09/07/2019 0758        Component Value Date/Time    CALCIUM 8.8 09/07/2019 0758    ALKPHOS 69 01/15/2020 1243    AST 42 (H) 01/15/2020 1243    ALT 73 (H) 01/15/2020 1243    BILITOT 0.8 01/15/2020 1243            Assessment/Plan:        Diagnosis Orders   1. Autoimmune hemolytic anemia (HCC)  CBC Auto Differential   2. On prednisone therapy        Elevated bilirubin, elevated LDH and undetectable haptoglobin  highly suggestive of hemolytic process. Direct Mar test Positive +. Hemolytic anemias can be classified according to whether the abnormality is intrinsic or extrinsic to the RBC, whether the condition is inherited or acquired,  whether the mechanism involves antibody-mediated destruction (immune versus non-immune mechanism), and whether the hemolysis occurs in the vasculature or in the reticuloendothelial macrophages in the liver and spleen (intravascular versus extravascular hemolysis. She does not have history of inherited hemolytic anemia. She was not placed on any new medications recently. Since glucocorticoids are the initial treatment of choice, she was placed on prednisone 60 mg p.o. twice daily. Since her hemoglobin dropped to 8.3 on October 4, 2019 and the patient was not able to tolerate high dose of prednisone we decided to start her on Rituxan.   She

## 2020-02-13 RX ORDER — SODIUM CHLORIDE 450 MG/100ML
INJECTION, SOLUTION INTRAVENOUS CONTINUOUS
Status: CANCELLED | OUTPATIENT
Start: 2020-02-13

## 2020-02-17 ENCOUNTER — HOSPITAL ENCOUNTER (OUTPATIENT)
Dept: GENERAL RADIOLOGY | Age: 55
Discharge: HOME | End: 2020-02-17
Payer: COMMERCIAL

## 2020-02-17 NOTE — PROGRESS NOTES
0830 pt arrives ambulatory with mother for myelogram. Procedure explained and questions answered. PT RIGHTS AND RESPONSIBILITIES OFFERED TO PT. Pt did not hold medication appropriately. 4231 Dr. Juliette Tobin called and notified. Dr. Juliette Tobin states to reschedule procedure. Spoke with Isaac Hopkins in [address] [place] [address] regarding 11AM reschedule day. 0130 Discharge instructions provided to pt and pt verbalized understanding. Pt discharged ambulatory with instructions with no complaints.                      _m___ Safety:       (Environmental)   [place] to environment   Ensure ID band is correct and in place/ allergy band as needed   Assess for fall risk   Initiate fall precautions as applicable (fall band, side rails, etc.)   Call light within reach   Bed in low position/ wheels locked    _m___ Pain:        Assess pain level and characteristics   Administer analgesics as ordered   Assess effectiveness of pain management and report to MD as needed    _m___ Knowledge Deficit:   Assess baseline knowledge   Provide teaching at level of understanding   Provide teaching via preferred learning method   Evaluate teaching effectiveness    __m__ Hemodynamic/Respiratory Status:       (Pre and Post Procedure Monitoring)   Assess/Monitor vital signs and LOC   Assess Baseline SpO2 prior to any sedation   Obtain weight/height   Assess vital signs/ LOC until patient meets discharge criteria   Monitor procedure site and notify MD of any issues

## 2020-02-18 RX ORDER — SODIUM CHLORIDE 450 MG/100ML
INJECTION, SOLUTION INTRAVENOUS CONTINUOUS
Status: CANCELLED | OUTPATIENT
Start: 2020-02-18

## 2020-02-19 ENCOUNTER — HOSPITAL ENCOUNTER (OUTPATIENT)
Dept: CT IMAGING | Age: 55
Discharge: HOME | End: 2020-02-19
Payer: COMMERCIAL

## 2020-02-19 ENCOUNTER — HOSPITAL ENCOUNTER (OUTPATIENT)
Dept: GENERAL RADIOLOGY | Age: 55
Discharge: HOME | End: 2020-02-19
Payer: COMMERCIAL

## 2020-02-19 VITALS
DIASTOLIC BLOOD PRESSURE: 69 MMHG | BODY MASS INDEX: 37.14 KG/M2 | SYSTOLIC BLOOD PRESSURE: 126 MMHG | OXYGEN SATURATION: 100 % | WEIGHT: 223.2 LBS | HEART RATE: 76 BPM | RESPIRATION RATE: 18 BRPM | TEMPERATURE: 97.5 F

## 2020-02-19 PROCEDURE — 6360000004 HC RX CONTRAST MEDICATION: Performed by: PHYSICIAN ASSISTANT

## 2020-02-19 PROCEDURE — 72132 CT LUMBAR SPINE W/DYE: CPT

## 2020-02-19 PROCEDURE — 72126 CT NECK SPINE W/DYE: CPT

## 2020-02-19 PROCEDURE — 72129 CT CHEST SPINE W/DYE: CPT

## 2020-02-19 PROCEDURE — 2580000003 HC RX 258: Performed by: RADIOLOGY

## 2020-02-19 PROCEDURE — 62305 MYELOGRAPHY LUMBAR INJECTION: CPT

## 2020-02-19 RX ORDER — SODIUM CHLORIDE 450 MG/100ML
INJECTION, SOLUTION INTRAVENOUS CONTINUOUS
Status: DISCONTINUED | OUTPATIENT
Start: 2020-02-19 | End: 2020-02-20 | Stop reason: HOSPADM

## 2020-02-19 RX ADMIN — IOHEXOL 10 ML: 300 INJECTION, SOLUTION INTRAVENOUS at 11:40

## 2020-02-19 RX ADMIN — SODIUM CHLORIDE: 4.5 INJECTION, SOLUTION INTRAVENOUS at 10:47

## 2020-02-19 ASSESSMENT — PAIN SCALES - GENERAL
PAINLEVEL_OUTOF10: 8
PAINLEVEL_OUTOF10: 5
PAINLEVEL_OUTOF10: 5

## 2020-02-19 ASSESSMENT — PAIN DESCRIPTION - DESCRIPTORS
DESCRIPTORS: BURNING;ACHING
DESCRIPTORS: HEADACHE
DESCRIPTORS: HEADACHE

## 2020-02-19 ASSESSMENT — PAIN DESCRIPTION - LOCATION: LOCATION: HEAD

## 2020-02-19 ASSESSMENT — PAIN DESCRIPTION - PAIN TYPE: TYPE: ACUTE PAIN

## 2020-02-19 ASSESSMENT — PAIN - FUNCTIONAL ASSESSMENT: PAIN_FUNCTIONAL_ASSESSMENT: 0-10

## 2020-02-19 NOTE — PROGRESS NOTES
Skinny Booker REVIEWED WITH PT VERBALIZED UNDERSTANDING. Pt rights and responsibilities offered to pt to read. OFF REQUIRED MEDS FOR 2 DAYS. HAND GRASP STRONG AND EQUAL, PEDAL PUSH AND PULL STRONG AND EQUAL    __MET__ Safety:       (Environmental)   Alligator to environment   Ensure ID band is correct and in place/ allergy band as needed   Assess for fall risk   Initiate fall precautions as applicable (fall band, side rails, etc.)   Call light within reach   Bed in low position/ wheels locked    _MET___ Pain:        Assess pain level and characteristics   Administer analgesics as ordered   Assess effectiveness of pain management and report to MD as needed    ___MET_ Knowledge Deficit:   Assess baseline knowledge   Provide teaching at level of understanding   Provide teaching via preferred learning method   Evaluate teaching effectiveness    __MET__ Hemodynamic/Respiratory Status:       (Pre and Post Procedure Monitoring)   Assess/Monitor vital signs and LOC      Obtain weight/height   Assess vital signs/ LOC until patient meets discharge criteria   Monitor procedure site and notify MD of any issues      1330 INJECTION SITE SOFT AND RY. HEAD ELEVATED HEADACHE IS NOW 5, LIKE AT HOME. FLUIDS TAKEN. LEG MOVEMENT GOOD  1400 HEADACHE IS 5 PT STATES SHE WILL TAKE HEADACHE MEDS AT HOME. GAIT STEADY. INJECTION SITE SOFT AND DRY. HOME INSTRUCTIONS TO PT VERBALIZED UNDERSTANDING.  1405 DISCHARGED PER [address] WITH FAMILY.

## 2020-02-19 NOTE — PROCEDURES
Myelogram (with Radiologist)     Technique:     The prodecure and risks were explained to the patient. Informed consent was obtained. The patient was placed in a prone position on the fluoroscopy table. A lumbar intervertebral disc space was localized under fluoroscopy. Under sterile conditions, a lumbar puncture was performed using a 20-gauge spinal needle after the administration of local anesthetic with 2% lidocaine. The subarachnoid space was accessed without difficulty and showed immediate clear cerebrospinal fluid. Approximately 10 mL of omnipaque-300 was then injected under fluoroscopy. The thecal sac was well opacified. The needle was removed and a bandage was placed over the puncture site. There were no immediate complications and blood loss was minimal. The patient was transferred to CT in satisfactory condition. Impression: Unremarkable contrast injection with CT scan to follow.

## 2020-03-05 ENCOUNTER — HOSPITAL ENCOUNTER (OUTPATIENT)
Dept: INTERVENTIONAL RADIOLOGY/VASCULAR | Age: 55
Discharge: HOME | End: 2020-03-05
Payer: COMMERCIAL

## 2020-03-05 VITALS
RESPIRATION RATE: 16 BRPM | BODY MASS INDEX: 37.28 KG/M2 | HEART RATE: 78 BPM | WEIGHT: 224 LBS | DIASTOLIC BLOOD PRESSURE: 93 MMHG | SYSTOLIC BLOOD PRESSURE: 134 MMHG | OXYGEN SATURATION: 99 % | TEMPERATURE: 97.3 F

## 2020-03-05 PROCEDURE — 6360000004 HC RX CONTRAST MEDICATION: Performed by: RADIOLOGY

## 2020-03-05 PROCEDURE — 6360000002 HC RX W HCPCS

## 2020-03-05 PROCEDURE — 64479 NJX AA&/STRD TFRM EPI C/T 1: CPT

## 2020-03-05 PROCEDURE — 2580000003 HC RX 258

## 2020-03-05 PROCEDURE — 2709999900 HC NON-CHARGEABLE SUPPLY

## 2020-03-05 RX ORDER — DEXAMETHASONE SODIUM PHOSPHATE 4 MG/ML
4 INJECTION, SOLUTION INTRA-ARTICULAR; INTRALESIONAL; INTRAMUSCULAR; INTRAVENOUS; SOFT TISSUE ONCE
Status: COMPLETED | OUTPATIENT
Start: 2020-03-05 | End: 2020-03-05

## 2020-03-05 RX ADMIN — Medication 1 ML: at 09:50

## 2020-03-05 RX ADMIN — IOHEXOL 1 ML: 180 INJECTION INTRAVENOUS at 09:50

## 2020-03-05 RX ADMIN — DEXAMETHASONE SODIUM PHOSPHATE 4 MG: 4 INJECTION, SOLUTION INTRA-ARTICULAR; INTRALESIONAL; INTRAMUSCULAR; INTRAVENOUS; SOFT TISSUE at 09:50

## 2020-03-05 ASSESSMENT — PAIN DESCRIPTION - DESCRIPTORS
DESCRIPTORS: NUMBNESS
DESCRIPTORS: NUMBNESS

## 2020-03-05 ASSESSMENT — PAIN SCALES - GENERAL
PAINLEVEL_OUTOF10: 3
PAINLEVEL_OUTOF10: 3

## 2020-03-05 ASSESSMENT — PAIN DESCRIPTION - LOCATION
LOCATION: NECK;EAR
LOCATION: NECK;EAR

## 2020-03-05 ASSESSMENT — PAIN DESCRIPTION - ORIENTATION
ORIENTATION: RIGHT
ORIENTATION: RIGHT

## 2020-03-05 ASSESSMENT — PAIN DESCRIPTION - PAIN TYPE
TYPE: ACUTE PAIN
TYPE: ACUTE PAIN

## 2020-03-05 ASSESSMENT — PAIN - FUNCTIONAL ASSESSMENT: PAIN_FUNCTIONAL_ASSESSMENT: 0-10

## 2020-03-05 NOTE — PROGRESS NOTES
1340 Pt in specials radiology for cervical selective nerve root block. Explained procedure to pt and pt verbalizes understanding. Consent signed. 1033 Dr Oanh Chowdhury to speak to pt.  4012 Pt positioned supine on table. 0947 Pt positioned on left side on table. Right neck prepped and draped. 6512 Selective cervical nerve root block C4-5 on right complete. Pt tolerated fair. 0954 Pt positioned on cart for comfort. States \"Just really sore. \"  3894 Pt transferred to [facility] per cart. Stable condition.

## 2020-03-11 ENCOUNTER — HOSPITAL ENCOUNTER (OUTPATIENT)
Dept: INFUSION THERAPY | Age: 55
Discharge: HOME | End: 2020-03-11
Payer: COMMERCIAL

## 2020-03-11 ENCOUNTER — OFFICE VISIT (OUTPATIENT)
Dept: ONCOLOGY | Age: 55
End: 2020-03-11
Payer: COMMERCIAL

## 2020-03-11 VITALS
SYSTOLIC BLOOD PRESSURE: 132 MMHG | RESPIRATION RATE: 18 BRPM | OXYGEN SATURATION: 97 % | WEIGHT: 224.8 LBS | DIASTOLIC BLOOD PRESSURE: 63 MMHG | BODY MASS INDEX: 37.45 KG/M2 | TEMPERATURE: 98.4 F | HEIGHT: 65 IN | HEART RATE: 84 BPM

## 2020-03-11 DIAGNOSIS — D59.10 AUTOIMMUNE HEMOLYTIC ANEMIA (HCC): ICD-10-CM

## 2020-03-11 LAB
BASINOPHIL, AUTOMATED: 0 % (ref 0–3)
EOSINOPHILS RELATIVE PERCENT: 2 % (ref 0–4)
HCT VFR BLD CALC: 37.7 % (ref 37–47)
HEMOGLOBIN: 12.7 GM/DL (ref 12–16)
LYMPHOCYTES # BLD: 30 % (ref 15–47)
MCH RBC QN AUTO: 32 PG (ref 27–31)
MCHC RBC AUTO-ENTMCNC: 33.8 GM/DL (ref 33–37)
MCV RBC AUTO: 95 FL (ref 81–99)
MONOCYTES: 10 % (ref 0–12)
PDW BLD-RTO: 11.7 % (ref 11.5–14.5)
PLATELET # BLD: 338 THOU/MM3 (ref 130–400)
PMV BLD AUTO: 7.6 FL (ref 7.4–10.4)
RBC # BLD: 3.98 MILL/MM3 (ref 4.2–5.4)
SEG NEUTROPHILS: 58 % (ref 43–75)
WBC # BLD: 5.8 THOU/MM3 (ref 4.8–10.8)

## 2020-03-11 PROCEDURE — 85025 COMPLETE CBC W/AUTO DIFF WBC: CPT

## 2020-03-11 PROCEDURE — 36415 COLL VENOUS BLD VENIPUNCTURE: CPT

## 2020-03-11 PROCEDURE — 99213 OFFICE O/P EST LOW 20 MIN: CPT | Performed by: INTERNAL MEDICINE

## 2020-03-11 PROCEDURE — 99211 OFF/OP EST MAY X REQ PHY/QHP: CPT

## 2020-03-11 RX ORDER — SPIRONOLACTONE 25 MG/1
25 TABLET ORAL DAILY
COMMUNITY

## 2020-03-11 ASSESSMENT — ENCOUNTER SYMPTOMS
VOMITING: 0
CONSTIPATION: 0
SHORTNESS OF BREATH: 1
WHEEZING: 0
TROUBLE SWALLOWING: 0
BACK PAIN: 1
EYE DISCHARGE: 0
FACIAL SWELLING: 0
CHEST TIGHTNESS: 0
COLOR CHANGE: 0
COUGH: 0
SORE THROAT: 0
DIARRHEA: 0
ABDOMINAL PAIN: 0
NAUSEA: 0
ABDOMINAL DISTENTION: 0
RECTAL PAIN: 0
BLOOD IN STOOL: 0

## 2020-03-11 NOTE — PROGRESS NOTES
[facility] [address] [facility] [address]  [place]  Dept: [phone]  Dept Fax: [phone]: [phone]    Visit Date:3/11/2020     Buster Ayers is a 49 y.o. female who presents today for:   Chief Complaint   Patient presents with    Follow-up     Autoimmune Hemolytic Anemia        HPI:     Buster Ayers is a 49 y.o. female admitted on 09/05/2019 for shortness of breath and weakness.  Patient denied having any active bleeding.  Patient had a hysterectomy with only the ovaries remaining.    Patient  had  isolated hypotension with a systolic in the 54R in ED, responded to IV fluid hydration. CBC on admission noted a WBC of 6000, hemoglobin 6.9, hematocrit 22.2, and platelets 001,237.  Glucose is 163.  BMP essentially normal.  proBNP 23.  Troponin nonelevated.  LFTs ALT 71, AST 74, bilirubin 2.0.  Lipase is 114.1.  Patient was transfused 1 packed red blood cells in the hopsital.   She has history of chronic back pain due to degenerative joint disease requiring multiple operations she is diabetic and obese. In March 2019 spinal cord stimulators was infected.  It was removed, treated with antibiotic. The wound opened up and had drainage for which she was started on Keflex. On June 18, 2019 she underwent laparoscopic cholecystectomy. She has not been eating normally since her cholecystectomy. On August 9, 2019 she had T7-8 spinal cord stimulator implantation. On August 23, 2019 she presented to the emergency room for evaluation of fever and dysuria. Blood cultures were negative. U/A without evidence of infection, completed three days of Levaquin prior to this admission. She denies fever, chills, night sweats,  abdominal pain, nausea/vomiting. In the hospital she was found to have elevated LDH at 421, haptoglobin below 10, direct Mar was positive for 1+,  although direct antiglobulin test was negative.   Patient's reticulocyte count was elevated at 39% with absolute reticulocyte count 158. She had elevated bilirubin. The patient underwent a esophagogastroduodenoscopy by Dr. Norma Luna that showed a distal esophagitis/dissect gastritis. She was started on high dose prednisone 60 mg bid on 9/7,  repeat hgb stable 7.3 on 9/8 pm. from 7.6. The patient started weekly Rituxan on October 7, 2019, last dose on October 28, 2019. Overall she tolerates it well. Interim history on 03/11//2020: This is a 2 months follow-up visit. The patient is off prednisone. She reports improved fatigue and less dyspnea on exertion. Denies having any new complaints since last visit with me.      HPI   Past Medical History:   Diagnosis Date    Chronic back pain     Depression 3/24/2013    Diabetes mellitus ([address]     GERD (gastroesophageal reflux disease)     Hypertension     Obesity 3/24/2013    HENRY on CPAP     Osteoarthritis     PONV (postoperative nausea and vomiting)     Prolonged emergence from general anesthesia     Pyelonephritis 3/24/2013    Unspecified sleep apnea     with C-Pap    UTI (urinary tract infection)       Past Surgical History:   Procedure Laterality Date    ANTERIOR CRUCIATE LIGAMENT REPAIR Left 2002    BACK SURGERY      lumbar    BACK SURGERY  05/01/2018    herniated disk     BREAST SURGERY      left bx--marker    CHOLECYSTECTOMY, LAPAROSCOPIC N/A 6/18/2019    LAPARASCOPIC CHOLECYSTECTOMY performed by Yuliana Peterson MD at [address]    Left wrist, hand and ankle    DILATATION, ESOPHAGUS      ENDOMETRIAL ABLATION  2008    Dr Thirza Schirmer, TOTAL ABDOMINAL  11/09/2016    Robotic    LOBECTOMY, THYROID  5/21/10    right lobectomy    NASAL SINUS SURGERY  11/2017    Dr. Shahriar Malik in [address] [address] N/A 2/22/2019    T7-8 SPINAL CORD STIMULATOR IMPLANT performed by Kaley Terrazas MD at [facility] N/A 3/21/2019    THORACIC I&D WITH SPINAL Allergies   Allergen Reactions    Contrast [Iodides] Anaphylaxis     With IV contrast      Shellfish Allergy     Penicillin G     Ampicillin Rash    Vancomycin Itching, Rash and Other (See Comments)     Tingling in lips      Health Maintenance   Topic Date Due    Pneumococcal 0-64 years Vaccine (1 of 1 - PPSV23) 06/13/1972    Diabetic foot exam  06/13/1976    Diabetic retinal exam  06/13/1976    Diabetic microalbuminuria test  06/13/1984    Hepatitis B vaccine (1 of 3 - Risk 3-dose series) 06/13/1985    DTaP/Tdap/Td vaccine (1 - Tdap) 06/13/1985    Shingles Vaccine (1 of 2) 06/13/2016    Colon cancer screen colonoscopy  06/13/2016    Flu vaccine (1) 09/01/2019    A1C test (Diabetic or Prediabetic)  11/28/2019    Lipid screen  11/28/2019    Breast cancer screen  10/30/2021    HIV screen  Completed    Hepatitis A vaccine  Aged Out    Hib vaccine  Aged Out    Meningococcal (ACWY) vaccine  Aged Out        Subjective:   Review of Systems   Constitutional: Positive for fatigue. Negative for activity change, appetite change and fever. HENT: Negative for congestion, dental problem, facial swelling, hearing loss, mouth sores, nosebleeds, sore throat, tinnitus and trouble swallowing. Eyes: Negative for discharge and visual disturbance. Respiratory: Positive for shortness of breath. Negative for cough, chest tightness and wheezing. Cardiovascular: Negative for chest pain, palpitations and leg swelling. Gastrointestinal: Negative for abdominal distention, abdominal pain, blood in stool, constipation, diarrhea, nausea, rectal pain and vomiting. Endocrine: Negative for cold intolerance, polydipsia and polyuria. Genitourinary: Negative for decreased urine volume, difficulty urinating, dysuria, flank pain, hematuria and urgency. Musculoskeletal: Positive for back pain. Negative for arthralgias, gait problem, joint swelling, myalgias and neck stiffness.    Skin: Negative for color change, Behavior: Behavior normal.         Thought Content: Thought content normal.         Judgment: Judgment normal.         /63 (Site: Left Upper Arm, Position: Sitting, Cuff Size: Medium Adult)   Pulse 84   Temp 98.4 °F (36.9 °C) (Oral)   Resp 18   Ht 5' 5\" (1.651 m)   Wt 224 lb 12.8 oz (102 kg)   SpO2 97%   BMI 37.41 kg/m²      ECOG status is 0    Imaging studies and labs:     Lab Results   Component Value Date    WBC 5.8 03/11/2020    HGB 12.7 03/11/2020    HCT 37.7 03/11/2020    MCV 95 03/11/2020     03/11/2020       Chemistry        Component Value Date/Time     (L) 11/06/2019 1008     09/07/2019 0758    K 3.6 11/06/2019 1008    K 4.2 09/07/2019 0758     09/07/2019 0758    CO2 23 09/07/2019 0758    BUN 13 09/07/2019 0758    CREATININE 0.9 11/06/2019 1008    CREATININE 0.8 09/07/2019 0758        Component Value Date/Time    CALCIUM 8.8 09/07/2019 0758    ALKPHOS 69 01/15/2020 1243    AST 42 (H) 01/15/2020 1243    ALT 73 (H) 01/15/2020 1243    BILITOT 0.8 01/15/2020 1243            Assessment/Plan:        Diagnosis Orders   1. Autoimmune hemolytic anemia (HCC)        Elevated bilirubin, elevated LDH and undetectable haptoglobin  highly suggestive of hemolytic process. Direct Mar test Positive +. Hemolytic anemias can be classified according to whether the abnormality is intrinsic or extrinsic to the RBC, whether the condition is inherited or acquired,  whether the mechanism involves antibody-mediated destruction (immune versus non-immune mechanism), and whether the hemolysis occurs in the vasculature or in the reticuloendothelial macrophages in the liver and spleen (intravascular versus extravascular hemolysis. She does not have history of inherited hemolytic anemia. She was not placed on any new medications recently. Since glucocorticoids are the initial treatment of choice, she was placed on prednisone 60 mg p.o. twice daily.     Since her hemoglobin dropped to 8.3 on

## 2020-05-18 RX ORDER — PEN NEEDLE, DIABETIC 31 GX5/16"
1 NEEDLE, DISPOSABLE MISCELLANEOUS DAILY
Qty: 100 EACH | Refills: 3 | Status: SHIPPED | OUTPATIENT
Start: 2020-05-18 | End: 2022-01-14 | Stop reason: SDUPTHER

## 2020-08-22 ENCOUNTER — HOSPITAL ENCOUNTER (OUTPATIENT)
Age: 55
Discharge: HOME | End: 2020-08-22
Payer: COMMERCIAL

## 2020-08-22 LAB
ALBUMIN SERPL-MCNC: 4.4 G/DL (ref 3.5–5.1)
ALP BLD-CCNC: 85 U/L (ref 38–126)
ALT SERPL-CCNC: 64 U/L (ref 11–66)
ANION GAP SERPL CALCULATED.3IONS-SCNC: 12 MEQ/L (ref 8–16)
AST SERPL-CCNC: 53 U/L (ref 5–40)
AVERAGE GLUCOSE: 183 MG/DL (ref 70–126)
BASOPHILS # BLD: 0.6 %
BASOPHILS ABSOLUTE: 0 THOU/MM3 (ref 0–0.1)
BILIRUB SERPL-MCNC: 0.8 MG/DL (ref 0.3–1.2)
BILIRUBIN DIRECT: < 0.2 MG/DL (ref 0–0.3)
BUN BLDV-MCNC: 10 MG/DL (ref 7–22)
CALCIUM SERPL-MCNC: 9.6 MG/DL (ref 8.5–10.5)
CHLORIDE BLD-SCNC: 103 MEQ/L (ref 98–111)
CHOLESTEROL, TOTAL: 187 MG/DL (ref 100–199)
CO2: 26 MEQ/L (ref 23–33)
CREAT SERPL-MCNC: 0.7 MG/DL (ref 0.4–1.2)
EOSINOPHIL # BLD: 2.8 %
EOSINOPHILS ABSOLUTE: 0.1 THOU/MM3 (ref 0–0.4)
ERYTHROCYTE [DISTWIDTH] IN BLOOD BY AUTOMATED COUNT: 12.3 % (ref 11.5–14.5)
ERYTHROCYTE [DISTWIDTH] IN BLOOD BY AUTOMATED COUNT: 42.4 FL (ref 35–45)
GFR SERPL CREATININE-BSD FRML MDRD: 87 ML/MIN/1.73M2
GLUCOSE BLD-MCNC: 214 MG/DL (ref 70–108)
HBA1C MFR BLD: 8.1 % (ref 4.4–6.4)
HCT VFR BLD CALC: 41.7 % (ref 37–47)
HDLC SERPL-MCNC: 38 MG/DL
HEMOGLOBIN: 13.5 GM/DL (ref 12–16)
IMMATURE GRANS (ABS): 0.01 THOU/MM3 (ref 0–0.07)
IMMATURE GRANULOCYTES: 0.2 %
LDL CHOLESTEROL CALCULATED: 119 MG/DL
LYMPHOCYTES # BLD: 31.2 %
LYMPHOCYTES ABSOLUTE: 1.6 THOU/MM3 (ref 1–4.8)
MCH RBC QN AUTO: 30.5 PG (ref 26–33)
MCHC RBC AUTO-ENTMCNC: 32.4 GM/DL (ref 32.2–35.5)
MCV RBC AUTO: 94.1 FL (ref 81–99)
MONOCYTES # BLD: 12.3 %
MONOCYTES ABSOLUTE: 0.6 THOU/MM3 (ref 0.4–1.3)
NUCLEATED RED BLOOD CELLS: 0 /100 WBC
PLATELET # BLD: 259 THOU/MM3 (ref 130–400)
PMV BLD AUTO: 11.3 FL (ref 9.4–12.4)
POTASSIUM SERPL-SCNC: 4.4 MEQ/L (ref 3.5–5.2)
RBC # BLD: 4.43 MILL/MM3 (ref 4.2–5.4)
SEG NEUTROPHILS: 52.9 %
SEGMENTED NEUTROPHILS ABSOLUTE COUNT: 2.7 THOU/MM3 (ref 1.8–7.7)
SODIUM BLD-SCNC: 141 MEQ/L (ref 135–145)
TOTAL PROTEIN: 7 G/DL (ref 6.1–8)
TRIGL SERPL-MCNC: 150 MG/DL (ref 0–199)
WBC # BLD: 5.1 THOU/MM3 (ref 4.8–10.8)

## 2020-08-22 PROCEDURE — 80061 LIPID PANEL: CPT

## 2020-08-22 PROCEDURE — 36415 COLL VENOUS BLD VENIPUNCTURE: CPT

## 2020-08-22 PROCEDURE — 85025 COMPLETE CBC W/AUTO DIFF WBC: CPT

## 2020-08-22 PROCEDURE — 82248 BILIRUBIN DIRECT: CPT

## 2020-08-22 PROCEDURE — 80053 COMPREHEN METABOLIC PANEL: CPT

## 2020-08-22 PROCEDURE — 83036 HEMOGLOBIN GLYCOSYLATED A1C: CPT

## 2020-11-13 ENCOUNTER — HOSPITAL ENCOUNTER (OUTPATIENT)
Dept: WOMENS IMAGING | Age: 55
Discharge: HOME | End: 2020-11-13
Payer: COMMERCIAL

## 2020-11-13 PROCEDURE — 77063 BREAST TOMOSYNTHESIS BI: CPT

## 2021-07-17 ENCOUNTER — HOSPITAL ENCOUNTER (OUTPATIENT)
Age: 56
Discharge: HOME | End: 2021-07-17
Payer: COMMERCIAL

## 2021-07-17 LAB
CREAT SERPL-MCNC: 0.7 MG/DL (ref 0.4–1.2)
GFR SERPL CREATININE-BSD FRML MDRD: 87 ML/MIN/1.73M2

## 2021-07-17 PROCEDURE — 36415 COLL VENOUS BLD VENIPUNCTURE: CPT

## 2021-07-17 PROCEDURE — 82565 ASSAY OF CREATININE: CPT

## 2021-07-26 ENCOUNTER — HOSPITAL ENCOUNTER (OUTPATIENT)
Dept: GENERAL RADIOLOGY | Age: 56
Discharge: HOME | End: 2021-07-26
Payer: COMMERCIAL

## 2021-07-26 ENCOUNTER — HOSPITAL ENCOUNTER (OUTPATIENT)
Dept: CT IMAGING | Age: 56
Discharge: HOME | End: 2021-07-26
Payer: COMMERCIAL

## 2021-07-26 DIAGNOSIS — M25.511 ACUTE PAIN OF RIGHT SHOULDER: ICD-10-CM

## 2021-07-26 PROCEDURE — 73201 CT UPPER EXTREMITY W/DYE: CPT

## 2021-07-26 PROCEDURE — 23350 INJECTION FOR SHOULDER X-RAY: CPT

## 2021-07-26 PROCEDURE — 6360000004 HC RX CONTRAST MEDICATION: Performed by: ORTHOPAEDIC SURGERY

## 2021-07-26 RX ADMIN — IOTHALAMATE MEGLUMINE 15 ML: 600 INJECTION INTRAVASCULAR at 10:35

## 2021-08-10 ENCOUNTER — HOSPITAL ENCOUNTER (OUTPATIENT)
Age: 56
Discharge: HOME | End: 2021-08-10
Payer: COMMERCIAL

## 2021-08-10 LAB
ALP BLD-CCNC: 119 U/L (ref 38–126)
ALT SERPL-CCNC: 49 U/L (ref 11–66)
AST SERPL-CCNC: 30 U/L (ref 5–40)

## 2021-08-10 PROCEDURE — 84075 ASSAY ALKALINE PHOSPHATASE: CPT

## 2021-08-10 PROCEDURE — 36415 COLL VENOUS BLD VENIPUNCTURE: CPT

## 2021-08-10 PROCEDURE — 84460 ALANINE AMINO (ALT) (SGPT): CPT

## 2021-08-10 PROCEDURE — 84450 TRANSFERASE (AST) (SGOT): CPT

## 2021-11-18 ENCOUNTER — HOSPITAL ENCOUNTER (OUTPATIENT)
Age: 56
Discharge: HOME | End: 2021-11-18
Payer: COMMERCIAL

## 2021-11-18 DIAGNOSIS — R19.7 DIARRHEA, UNSPECIFIED TYPE: ICD-10-CM

## 2021-11-18 LAB
ADENOVIRUS F 40 41 PCR: NOT DETECTED
ASTROVIRUS PCR: NOT DETECTED
C DIFF TOXIN/ANTIGEN: NEGATIVE
CAMPYLOBACTER PCR: NOT DETECTED
CLOSTRIDIUM DIFFICILE, PCR: NORMAL
CRYPTOSPORIDIUM PCR: NOT DETECTED
CYCLOSPORA CAYETANENSIS PCR: NOT DETECTED
E COLI 0157 PCR: NORMAL
E COLI ENTEROAGGREGATIVE PCR: NOT DETECTED
E COLI ENTEROPATHOGENIC PCR: NOT DETECTED
E COLI ENTEROTOXIGENIC PCR: NOT DETECTED
E COLI SHIGA LIKE TOXIN PCR: NOT DETECTED
E COLI SHIGELLA/ENTEROINVASIVE PCR: NOT DETECTED
E HISTOLYTICA GI FILM ARRAY: NOT DETECTED
GIARDIA LAMBLIA PCR: NOT DETECTED
NOROVIRUS GI GII PCR: NOT DETECTED
PLESIOMONAS SHIGELLOIDES PCR: NOT DETECTED
ROTAVIRUS A PCR: NOT DETECTED
SALMONELLA PCR: NOT DETECTED
SAPOVIRUS PCR: NOT DETECTED
VIBRIO CHOLERAE PCR: NOT DETECTED
VIBRIO PCR: NOT DETECTED
YERSINIA ENTEROCOLITICA PCR: NOT DETECTED

## 2021-11-18 PROCEDURE — 87449 NOS EACH ORGANISM AG IA: CPT

## 2021-11-18 PROCEDURE — 0097U HC GI PTHGN MULT REV TRANS & AMP PRB TECH 22 TRGT: CPT

## 2021-11-19 ENCOUNTER — HOSPITAL ENCOUNTER (OUTPATIENT)
Age: 56
Discharge: HOME | End: 2021-11-19
Payer: COMMERCIAL

## 2021-11-19 DIAGNOSIS — E11.9 TYPE 2 DIABETES MELLITUS WITHOUT COMPLICATION, WITH LONG-TERM CURRENT USE OF INSULIN (HCC): ICD-10-CM

## 2021-11-19 DIAGNOSIS — Z79.4 TYPE 2 DIABETES MELLITUS WITHOUT COMPLICATION, WITH LONG-TERM CURRENT USE OF INSULIN (HCC): ICD-10-CM

## 2021-11-19 DIAGNOSIS — Z13.6 SCREENING FOR ISCHEMIC HEART DISEASE: ICD-10-CM

## 2021-11-19 DIAGNOSIS — Z51.81 MEDICATION MONITORING ENCOUNTER: ICD-10-CM

## 2021-11-19 DIAGNOSIS — R79.89 ELEVATED LFTS: ICD-10-CM

## 2021-11-19 LAB
ALBUMIN SERPL-MCNC: 4.5 G/DL (ref 3.5–5.1)
ALP BLD-CCNC: 117 U/L (ref 38–126)
ALT SERPL-CCNC: 43 U/L (ref 11–66)
ANION GAP SERPL CALCULATED.3IONS-SCNC: 16 MEQ/L (ref 8–16)
AST SERPL-CCNC: 30 U/L (ref 5–40)
AVERAGE GLUCOSE: 255 MG/DL (ref 70–126)
BASOPHILS # BLD: 0.7 %
BASOPHILS ABSOLUTE: 0 THOU/MM3 (ref 0–0.1)
BILIRUB SERPL-MCNC: 0.6 MG/DL (ref 0.3–1.2)
BILIRUBIN DIRECT: < 0.2 MG/DL (ref 0–0.3)
BUN BLDV-MCNC: 9 MG/DL (ref 7–22)
CALCIUM SERPL-MCNC: 9.7 MG/DL (ref 8.5–10.5)
CHLORIDE BLD-SCNC: 100 MEQ/L (ref 98–111)
CHOLESTEROL, TOTAL: 193 MG/DL (ref 100–199)
CO2: 24 MEQ/L (ref 23–33)
CREAT SERPL-MCNC: 0.7 MG/DL (ref 0.4–1.2)
EOSINOPHIL # BLD: 3.9 %
EOSINOPHILS ABSOLUTE: 0.2 THOU/MM3 (ref 0–0.4)
ERYTHROCYTE [DISTWIDTH] IN BLOOD BY AUTOMATED COUNT: 12.3 % (ref 11.5–14.5)
ERYTHROCYTE [DISTWIDTH] IN BLOOD BY AUTOMATED COUNT: 39.9 FL (ref 35–45)
GFR SERPL CREATININE-BSD FRML MDRD: 87 ML/MIN/1.73M2
GLUCOSE BLD-MCNC: 233 MG/DL (ref 70–108)
HBA1C MFR BLD: 10.5 % (ref 4.4–6.4)
HCT VFR BLD CALC: 41.5 % (ref 37–47)
HDLC SERPL-MCNC: 41 MG/DL
HEMOGLOBIN: 13.6 GM/DL (ref 12–16)
IMMATURE GRANS (ABS): 0.01 THOU/MM3 (ref 0–0.07)
IMMATURE GRANULOCYTES: 0.2 %
LDL CHOLESTEROL CALCULATED: 113 MG/DL
LYMPHOCYTES # BLD: 35.8 %
LYMPHOCYTES ABSOLUTE: 1.6 THOU/MM3 (ref 1–4.8)
MCH RBC QN AUTO: 29.2 PG (ref 26–33)
MCHC RBC AUTO-ENTMCNC: 32.8 GM/DL (ref 32.2–35.5)
MCV RBC AUTO: 89.1 FL (ref 81–99)
MONOCYTES # BLD: 10.5 %
MONOCYTES ABSOLUTE: 0.5 THOU/MM3 (ref 0.4–1.3)
NUCLEATED RED BLOOD CELLS: 0 /100 WBC
PLATELET # BLD: 244 THOU/MM3 (ref 130–400)
PMV BLD AUTO: 11.6 FL (ref 9.4–12.4)
POTASSIUM SERPL-SCNC: 4.5 MEQ/L (ref 3.5–5.2)
RBC # BLD: 4.66 MILL/MM3 (ref 4.2–5.4)
SEG NEUTROPHILS: 48.9 %
SEGMENTED NEUTROPHILS ABSOLUTE COUNT: 2.2 THOU/MM3 (ref 1.8–7.7)
SODIUM BLD-SCNC: 140 MEQ/L (ref 135–145)
TOTAL PROTEIN: 7.1 G/DL (ref 6.1–8)
TRIGL SERPL-MCNC: 195 MG/DL (ref 0–199)
WBC # BLD: 4.6 THOU/MM3 (ref 4.8–10.8)

## 2021-11-19 PROCEDURE — 36415 COLL VENOUS BLD VENIPUNCTURE: CPT

## 2021-11-19 PROCEDURE — 80053 COMPREHEN METABOLIC PANEL: CPT

## 2021-11-19 PROCEDURE — 80061 LIPID PANEL: CPT

## 2021-11-19 PROCEDURE — 82248 BILIRUBIN DIRECT: CPT

## 2021-11-19 PROCEDURE — 85025 COMPLETE CBC W/AUTO DIFF WBC: CPT

## 2021-11-19 PROCEDURE — 83036 HEMOGLOBIN GLYCOSYLATED A1C: CPT

## 2021-11-29 ENCOUNTER — HOSPITAL ENCOUNTER (OUTPATIENT)
Dept: CT IMAGING | Age: 56
Discharge: HOME | End: 2021-11-29
Payer: COMMERCIAL

## 2021-11-29 DIAGNOSIS — M25.78 OSTEOPHYTE OF CERVICAL SPINE: ICD-10-CM

## 2021-11-29 DIAGNOSIS — Z47.89 AFTERCARE FOLLOWING SURGERY OF THE MUSCULOSKELETAL SYSTEM, NEC: ICD-10-CM

## 2021-11-29 DIAGNOSIS — M54.2 PAIN, NECK: ICD-10-CM

## 2021-11-29 PROCEDURE — 72125 CT NECK SPINE W/O DYE: CPT

## 2021-12-02 ENCOUNTER — HOSPITAL ENCOUNTER (OUTPATIENT)
Dept: WOMENS IMAGING | Age: 56
Discharge: HOME | End: 2021-12-02
Payer: COMMERCIAL

## 2021-12-02 DIAGNOSIS — Z12.31 VISIT FOR SCREENING MAMMOGRAM: ICD-10-CM

## 2021-12-02 PROCEDURE — 77063 BREAST TOMOSYNTHESIS BI: CPT

## 2022-01-03 ENCOUNTER — OFFICE VISIT (OUTPATIENT)
Dept: PULMONOLOGY | Age: 57
End: 2022-01-03
Payer: COMMERCIAL

## 2022-01-03 VITALS
DIASTOLIC BLOOD PRESSURE: 82 MMHG | WEIGHT: 227.4 LBS | HEART RATE: 79 BPM | BODY MASS INDEX: 37.89 KG/M2 | HEIGHT: 65 IN | SYSTOLIC BLOOD PRESSURE: 132 MMHG | TEMPERATURE: 98.3 F | OXYGEN SATURATION: 99 %

## 2022-01-03 DIAGNOSIS — E66.9 OBESITY (BMI 30-39.9): ICD-10-CM

## 2022-01-03 DIAGNOSIS — Z99.89 OSA ON CPAP: Primary | ICD-10-CM

## 2022-01-03 DIAGNOSIS — G47.33 OSA ON CPAP: Primary | ICD-10-CM

## 2022-01-03 PROCEDURE — 99213 OFFICE O/P EST LOW 20 MIN: CPT | Performed by: PHYSICIAN ASSISTANT

## 2022-01-03 ASSESSMENT — ENCOUNTER SYMPTOMS
NAUSEA: 0
BACK PAIN: 0
SHORTNESS OF BREATH: 0
WHEEZING: 0
ALLERGIC/IMMUNOLOGIC NEGATIVE: 1
DIARRHEA: 0
STRIDOR: 0
EYES NEGATIVE: 1
CHEST TIGHTNESS: 0
COUGH: 0

## 2022-01-03 NOTE — PROGRESS NOTES
Musculoskeletal: Negative. Negative for arthralgias and back pain. Skin: Negative. Allergic/Immunologic: Negative. Neurological: Negative. Negative for dizziness and light-headedness. Psychiatric/Behavioral: Negative. All other systems reviewed and are negative. Physical Exam:    BMI:  Body mass index is 37.84 kg/m². Wt Readings from Last 3 Encounters:   01/03/22 227 lb 6.4 oz (103.1 kg)   11/16/21 210 lb (95.3 kg)   03/11/20 224 lb 12.8 oz (102 kg)     Weight gained 17 lbs over 2 months  Vitals: /82 (Site: Right Upper Arm, Position: Sitting, Cuff Size: Large Adult)   Pulse 79   Temp 98.3 °F (36.8 °C) (Temporal)   Ht 5' 5\" (1.651 m)   Wt 227 lb 6.4 oz (103.1 kg)   SpO2 99%   BMI 37.84 kg/m²       Physical Exam  Constitutional:       Appearance: She is well-developed. HENT:      Head: Normocephalic and atraumatic. Right Ear: External ear normal.      Left Ear: External ear normal.   Eyes:      Conjunctiva/sclera: Conjunctivae normal.      Pupils: Pupils are equal, round, and reactive to light. Cardiovascular:      Rate and Rhythm: Normal rate and regular rhythm. Heart sounds: Normal heart sounds. Pulmonary:      Effort: Pulmonary effort is normal.      Breath sounds: Normal breath sounds. Musculoskeletal:         General: Normal range of motion. Cervical back: Normal range of motion and neck supple. Skin:     General: Skin is warm and dry. Neurological:      Mental Status: She is alert and oriented to person, place, and time. Psychiatric:         Behavior: Behavior normal.         Thought Content: Thought content normal.         Judgment: Judgment normal.           ASSESSMENT/DIAGNOSIS     Diagnosis Orders   1. HENRY on CPAP     2. Obesity (BMI 30-39. 9)              Plan   Do you need any equipment today?  Yes update supplies  - She will notify us what she is taking for sleep  - Download reviewed and discussed with patient  - She  was advised to continue current positive airway pressure therapy with above described pressure. - She  advised to keep good compliance with current recommended pressure to get optimal results and clinical improvement  - Recommend 7-9 hours of sleep with PAP  - She was advised to call [employer] company regarding supplies if needed.   -She call my office for earlier appointment if needed for worsening of sleep symptoms.   - She was instructed on weight loss  - Gala Kingers was educated about my impression and plan. Patient verbalizesunderstanding.   We will see Tanja Browning back in: 1 year with download    Information added by my medical assistant/LPN was reviewed today         Parth Pepe PA-C, MPAS  1/3/2022

## 2022-01-07 ENCOUNTER — HOSPITAL ENCOUNTER (OUTPATIENT)
Age: 57
Discharge: HOME | End: 2022-01-07
Payer: COMMERCIAL

## 2022-01-07 ENCOUNTER — HOSPITAL ENCOUNTER (OUTPATIENT)
Dept: GENERAL RADIOLOGY | Age: 57
Discharge: HOME | End: 2022-01-07
Payer: COMMERCIAL

## 2022-01-07 DIAGNOSIS — I10 ESSENTIAL HYPERTENSION, BENIGN: ICD-10-CM

## 2022-01-07 DIAGNOSIS — Z01.812 PRE-OPERATIVE LABORATORY EXAMINATION: ICD-10-CM

## 2022-01-07 DIAGNOSIS — E11.9 TYPE 2 DIABETES MELLITUS WITHOUT COMPLICATION, UNSPECIFIED WHETHER LONG TERM INSULIN USE (HCC): ICD-10-CM

## 2022-01-07 DIAGNOSIS — M48.02 CERVICAL SPINAL STENOSIS: ICD-10-CM

## 2022-01-07 LAB
ANION GAP SERPL CALCULATED.3IONS-SCNC: 7 MEQ/L (ref 8–16)
APTT: 29.8 SECONDS (ref 22–38)
AVERAGE GLUCOSE: 216 MG/DL (ref 70–126)
BASOPHILS # BLD: 0.9 %
BASOPHILS ABSOLUTE: 0.1 THOU/MM3 (ref 0–0.1)
BUN BLDV-MCNC: 9 MG/DL (ref 7–22)
CALCIUM SERPL-MCNC: 9.6 MG/DL (ref 8.5–10.5)
CHLORIDE BLD-SCNC: 103 MEQ/L (ref 98–111)
CO2: 29 MEQ/L (ref 23–33)
CREAT SERPL-MCNC: 0.7 MG/DL (ref 0.4–1.2)
EKG ATRIAL RATE: 80 BPM
EKG P AXIS: 32 DEGREES
EKG P-R INTERVAL: 152 MS
EKG Q-T INTERVAL: 384 MS
EKG QRS DURATION: 70 MS
EKG QTC CALCULATION (BAZETT): 442 MS
EKG R AXIS: 24 DEGREES
EKG T AXIS: 49 DEGREES
EKG VENTRICULAR RATE: 80 BPM
EOSINOPHIL # BLD: 2.3 %
EOSINOPHILS ABSOLUTE: 0.1 THOU/MM3 (ref 0–0.4)
ERYTHROCYTE [DISTWIDTH] IN BLOOD BY AUTOMATED COUNT: 12.3 % (ref 11.5–14.5)
ERYTHROCYTE [DISTWIDTH] IN BLOOD BY AUTOMATED COUNT: 40 FL (ref 35–45)
GFR SERPL CREATININE-BSD FRML MDRD: 87 ML/MIN/1.73M2
GLUCOSE BLD-MCNC: 125 MG/DL (ref 70–108)
HBA1C MFR BLD: 9.2 % (ref 4.4–6.4)
HCT VFR BLD CALC: 41.4 % (ref 37–47)
HEMOGLOBIN: 13.5 GM/DL (ref 12–16)
IMMATURE GRANS (ABS): 0.01 THOU/MM3 (ref 0–0.07)
IMMATURE GRANULOCYTES: 0.2 %
INR BLD: 0.96 (ref 0.85–1.13)
LYMPHOCYTES # BLD: 30.2 %
LYMPHOCYTES ABSOLUTE: 1.7 THOU/MM3 (ref 1–4.8)
MCH RBC QN AUTO: 29.2 PG (ref 26–33)
MCHC RBC AUTO-ENTMCNC: 32.6 GM/DL (ref 32.2–35.5)
MCV RBC AUTO: 89.4 FL (ref 81–99)
MONOCYTES # BLD: 10.3 %
MONOCYTES ABSOLUTE: 0.6 THOU/MM3 (ref 0.4–1.3)
MRSA SCREEN RT-PCR: NEGATIVE
NUCLEATED RED BLOOD CELLS: 0 /100 WBC
PLATELET # BLD: 260 THOU/MM3 (ref 130–400)
PMV BLD AUTO: 11 FL (ref 9.4–12.4)
POTASSIUM SERPL-SCNC: 4 MEQ/L (ref 3.5–5.2)
RBC # BLD: 4.63 MILL/MM3 (ref 4.2–5.4)
SEG NEUTROPHILS: 56.1 %
SEGMENTED NEUTROPHILS ABSOLUTE COUNT: 3.1 THOU/MM3 (ref 1.8–7.7)
SODIUM BLD-SCNC: 139 MEQ/L (ref 135–145)
WBC # BLD: 5.6 THOU/MM3 (ref 4.8–10.8)

## 2022-01-07 PROCEDURE — 85610 PROTHROMBIN TIME: CPT

## 2022-01-07 PROCEDURE — 87641 MR-STAPH DNA AMP PROBE: CPT

## 2022-01-07 PROCEDURE — 93010 ELECTROCARDIOGRAM REPORT: CPT | Performed by: INTERNAL MEDICINE

## 2022-01-07 PROCEDURE — 85730 THROMBOPLASTIN TIME PARTIAL: CPT

## 2022-01-07 PROCEDURE — 71046 X-RAY EXAM CHEST 2 VIEWS: CPT

## 2022-01-07 PROCEDURE — 85025 COMPLETE CBC W/AUTO DIFF WBC: CPT

## 2022-01-07 PROCEDURE — 80048 BASIC METABOLIC PNL TOTAL CA: CPT

## 2022-01-07 PROCEDURE — 93005 ELECTROCARDIOGRAM TRACING: CPT | Performed by: PHYSICIAN ASSISTANT

## 2022-01-07 PROCEDURE — 83036 HEMOGLOBIN GLYCOSYLATED A1C: CPT

## 2022-01-07 PROCEDURE — 36415 COLL VENOUS BLD VENIPUNCTURE: CPT

## 2022-04-19 PROBLEM — N18.30 STAGE 3 CHRONIC KIDNEY DISEASE (HCC): Status: ACTIVE | Noted: 2018-06-26

## 2022-04-19 PROBLEM — M76.899 ENTHESOPATHY OF HIP: Status: ACTIVE | Noted: 2022-04-19

## 2022-04-19 PROBLEM — E55.9 VITAMIN D DEFICIENCY: Status: ACTIVE | Noted: 2018-06-26

## 2022-04-19 PROBLEM — M70.62 TROCHANTERIC BURSITIS OF LEFT HIP: Status: ACTIVE | Noted: 2022-04-19

## 2022-05-09 ENCOUNTER — HOSPITAL ENCOUNTER (OUTPATIENT)
Dept: GENERAL RADIOLOGY | Age: 57
Discharge: HOME | End: 2022-05-09
Payer: COMMERCIAL

## 2022-05-09 ENCOUNTER — HOSPITAL ENCOUNTER (OUTPATIENT)
Dept: CT IMAGING | Age: 57
Discharge: HOME | End: 2022-05-09
Payer: COMMERCIAL

## 2022-05-09 DIAGNOSIS — M25.562 LEFT KNEE PAIN, UNSPECIFIED CHRONICITY: ICD-10-CM

## 2022-05-09 PROCEDURE — 27369 NJX CNTRST KNE ARTHG/CT/MRI: CPT

## 2022-05-09 PROCEDURE — 73701 CT LOWER EXTREMITY W/DYE: CPT

## 2022-05-09 PROCEDURE — 6360000004 HC RX CONTRAST MEDICATION: Performed by: PHYSICIAN ASSISTANT

## 2022-05-09 RX ADMIN — IOTHALAMATE MEGLUMINE 50 ML: 600 INJECTION INTRAVASCULAR at 08:50

## 2022-06-06 ENCOUNTER — HOSPITAL ENCOUNTER (OUTPATIENT)
Age: 57
Discharge: HOME | End: 2022-06-06
Payer: COMMERCIAL

## 2022-06-06 LAB
BUN BLDV-MCNC: 10 MG/DL (ref 7–22)
CREAT SERPL-MCNC: 0.8 MG/DL (ref 0.4–1.2)
GFR SERPL CREATININE-BSD FRML MDRD: 74 ML/MIN/1.73M2
HCT VFR BLD CALC: 42.4 % (ref 37–47)
HEMOGLOBIN: 13.8 GM/DL (ref 12–16)
POTASSIUM SERPL-SCNC: 4.2 MEQ/L (ref 3.5–5.2)

## 2022-06-06 PROCEDURE — 85018 HEMOGLOBIN: CPT

## 2022-06-06 PROCEDURE — 84520 ASSAY OF UREA NITROGEN: CPT

## 2022-06-06 PROCEDURE — 82565 ASSAY OF CREATININE: CPT

## 2022-06-06 PROCEDURE — 84132 ASSAY OF SERUM POTASSIUM: CPT

## 2022-06-06 PROCEDURE — 85014 HEMATOCRIT: CPT

## 2022-06-06 PROCEDURE — 36415 COLL VENOUS BLD VENIPUNCTURE: CPT

## 2022-08-03 ENCOUNTER — HOSPITAL ENCOUNTER (OUTPATIENT)
Age: 57
Discharge: HOME | End: 2022-08-03
Payer: COMMERCIAL

## 2022-08-03 DIAGNOSIS — Z79.4 TYPE 2 DIABETES MELLITUS WITH HYPERGLYCEMIA, WITH LONG-TERM CURRENT USE OF INSULIN (HCC): ICD-10-CM

## 2022-08-03 DIAGNOSIS — E11.65 TYPE 2 DIABETES MELLITUS WITH HYPERGLYCEMIA, WITH LONG-TERM CURRENT USE OF INSULIN (HCC): ICD-10-CM

## 2022-08-03 LAB
ANION GAP SERPL CALCULATED.3IONS-SCNC: 14 MEQ/L (ref 8–16)
AVERAGE GLUCOSE: 177 MG/DL (ref 70–126)
BUN BLDV-MCNC: 9 MG/DL (ref 7–22)
CALCIUM SERPL-MCNC: 9.4 MG/DL (ref 8.5–10.5)
CHLORIDE BLD-SCNC: 106 MEQ/L (ref 98–111)
CHOLESTEROL, TOTAL: 190 MG/DL (ref 100–199)
CO2: 24 MEQ/L (ref 23–33)
CREAT SERPL-MCNC: 0.7 MG/DL (ref 0.4–1.2)
GFR SERPL CREATININE-BSD FRML MDRD: 87 ML/MIN/1.73M2
GLUCOSE BLD-MCNC: 131 MG/DL (ref 70–108)
HBA1C MFR BLD: 7.9 % (ref 4.4–6.4)
HDLC SERPL-MCNC: 42 MG/DL
LDL CHOLESTEROL CALCULATED: 122 MG/DL
POTASSIUM SERPL-SCNC: 4.4 MEQ/L (ref 3.5–5.2)
SODIUM BLD-SCNC: 144 MEQ/L (ref 135–145)
TRIGL SERPL-MCNC: 129 MG/DL (ref 0–199)

## 2022-08-03 PROCEDURE — 80061 LIPID PANEL: CPT

## 2022-08-03 PROCEDURE — 36415 COLL VENOUS BLD VENIPUNCTURE: CPT

## 2022-08-03 PROCEDURE — 80048 BASIC METABOLIC PNL TOTAL CA: CPT

## 2022-08-03 PROCEDURE — 83036 HEMOGLOBIN GLYCOSYLATED A1C: CPT

## 2022-11-02 ENCOUNTER — HOSPITAL ENCOUNTER (OUTPATIENT)
Dept: CT IMAGING | Age: 57
Discharge: HOME | End: 2022-11-02
Payer: COMMERCIAL

## 2022-11-02 ENCOUNTER — HOSPITAL ENCOUNTER (OUTPATIENT)
Dept: GENERAL RADIOLOGY | Age: 57
Discharge: HOME | End: 2022-11-02
Payer: COMMERCIAL

## 2022-11-02 DIAGNOSIS — M25.511 RIGHT SHOULDER PAIN, UNSPECIFIED CHRONICITY: ICD-10-CM

## 2022-11-02 DIAGNOSIS — M25.512 LEFT SHOULDER PAIN, UNSPECIFIED CHRONICITY: ICD-10-CM

## 2022-11-02 PROCEDURE — 23350 INJECTION FOR SHOULDER X-RAY: CPT

## 2022-11-02 PROCEDURE — 6360000004 HC RX CONTRAST MEDICATION: Performed by: ORTHOPAEDIC SURGERY

## 2022-11-02 PROCEDURE — 73201 CT UPPER EXTREMITY W/DYE: CPT

## 2022-11-02 RX ADMIN — IOTHALAMATE MEGLUMINE 15 ML: 600 INJECTION INTRAVASCULAR at 08:58

## 2022-11-02 RX ADMIN — IOTHALAMATE MEGLUMINE 15 ML: 600 INJECTION INTRAVASCULAR at 08:54

## 2022-12-14 ENCOUNTER — HOSPITAL ENCOUNTER (OUTPATIENT)
Age: 57
Discharge: HOME | End: 2022-12-14
Payer: COMMERCIAL

## 2022-12-14 DIAGNOSIS — Z79.4 TYPE 2 DIABETES MELLITUS WITH HYPERGLYCEMIA, WITH LONG-TERM CURRENT USE OF INSULIN (HCC): ICD-10-CM

## 2022-12-14 DIAGNOSIS — E11.65 TYPE 2 DIABETES MELLITUS WITH HYPERGLYCEMIA, WITH LONG-TERM CURRENT USE OF INSULIN (HCC): ICD-10-CM

## 2022-12-14 LAB
ALBUMIN SERPL-MCNC: 4.1 G/DL (ref 3.5–5.1)
ALP BLD-CCNC: 129 U/L (ref 38–126)
ALT SERPL-CCNC: 30 U/L (ref 11–66)
ANION GAP SERPL CALCULATED.3IONS-SCNC: 9 MEQ/L (ref 8–16)
AST SERPL-CCNC: 20 U/L (ref 5–40)
BILIRUB SERPL-MCNC: 0.5 MG/DL (ref 0.3–1.2)
BUN BLDV-MCNC: 12 MG/DL (ref 7–22)
CALCIUM SERPL-MCNC: 9.8 MG/DL (ref 8.5–10.5)
CHLORIDE BLD-SCNC: 104 MEQ/L (ref 98–111)
CHOLESTEROL, TOTAL: 137 MG/DL (ref 100–199)
CO2: 28 MEQ/L (ref 23–33)
CREAT SERPL-MCNC: 0.8 MG/DL (ref 0.4–1.2)
CREATININE, URINE: 129.5 MG/DL
GFR SERPL CREATININE-BSD FRML MDRD: > 60 ML/MIN/1.73M2
GLUCOSE BLD-MCNC: 190 MG/DL (ref 70–108)
HDLC SERPL-MCNC: 38 MG/DL
LDL CHOLESTEROL CALCULATED: 65 MG/DL
MICROALBUMIN UR-MCNC: 2.34 MG/DL
MICROALBUMIN/CREAT UR-RTO: 18 MG/G (ref 0–30)
POTASSIUM SERPL-SCNC: 4.4 MEQ/L (ref 3.5–5.2)
SODIUM BLD-SCNC: 141 MEQ/L (ref 135–145)
TOTAL PROTEIN: 7.3 G/DL (ref 6.1–8)
TRIGL SERPL-MCNC: 168 MG/DL (ref 0–199)

## 2022-12-14 PROCEDURE — 82043 UR ALBUMIN QUANTITATIVE: CPT

## 2022-12-14 PROCEDURE — 80061 LIPID PANEL: CPT

## 2022-12-14 PROCEDURE — 80053 COMPREHEN METABOLIC PANEL: CPT

## 2022-12-14 PROCEDURE — 36415 COLL VENOUS BLD VENIPUNCTURE: CPT

## 2022-12-29 ENCOUNTER — HOSPITAL ENCOUNTER (OUTPATIENT)
Age: 57
Discharge: HOME | End: 2022-12-29
Payer: COMMERCIAL

## 2022-12-29 DIAGNOSIS — Z79.4 TYPE 2 DIABETES MELLITUS WITH HYPERGLYCEMIA, WITH LONG-TERM CURRENT USE OF INSULIN (HCC): ICD-10-CM

## 2022-12-29 DIAGNOSIS — E11.65 TYPE 2 DIABETES MELLITUS WITH HYPERGLYCEMIA, WITH LONG-TERM CURRENT USE OF INSULIN (HCC): ICD-10-CM

## 2022-12-29 LAB
ALBUMIN SERPL-MCNC: 4.2 G/DL (ref 3.5–5.1)
ALP BLD-CCNC: 100 U/L (ref 38–126)
ALT SERPL-CCNC: 32 U/L (ref 11–66)
AST SERPL-CCNC: 30 U/L (ref 5–40)
AVERAGE GLUCOSE: 153 MG/DL (ref 70–126)
BILIRUB SERPL-MCNC: 0.5 MG/DL (ref 0.3–1.2)
BILIRUBIN DIRECT: < 0.2 MG/DL (ref 0–0.3)
HBA1C MFR BLD: 7.1 % (ref 4.4–6.4)
TOTAL PROTEIN: 7.4 G/DL (ref 6.1–8)
VITAMIN D 25-HYDROXY: 23 NG/ML (ref 30–100)

## 2022-12-29 PROCEDURE — 83036 HEMOGLOBIN GLYCOSYLATED A1C: CPT

## 2022-12-29 PROCEDURE — 84080 ASSAY ALKALINE PHOSPHATASES: CPT

## 2022-12-29 PROCEDURE — 80076 HEPATIC FUNCTION PANEL: CPT

## 2022-12-29 PROCEDURE — 82306 VITAMIN D 25 HYDROXY: CPT

## 2022-12-29 PROCEDURE — 84075 ASSAY ALKALINE PHOSPHATASE: CPT

## 2022-12-29 PROCEDURE — 36415 COLL VENOUS BLD VENIPUNCTURE: CPT

## 2023-01-03 ENCOUNTER — OFFICE VISIT (OUTPATIENT)
Dept: PULMONOLOGY | Age: 58
End: 2023-01-03
Payer: COMMERCIAL

## 2023-01-03 VITALS
TEMPERATURE: 98.1 F | HEART RATE: 83 BPM | HEIGHT: 65 IN | WEIGHT: 230.2 LBS | SYSTOLIC BLOOD PRESSURE: 124 MMHG | BODY MASS INDEX: 38.35 KG/M2 | DIASTOLIC BLOOD PRESSURE: 84 MMHG | OXYGEN SATURATION: 97 %

## 2023-01-03 DIAGNOSIS — Z99.89 OSA ON CPAP: Primary | ICD-10-CM

## 2023-01-03 DIAGNOSIS — G47.33 OSA ON CPAP: Primary | ICD-10-CM

## 2023-01-03 DIAGNOSIS — E66.9 OBESITY (BMI 30-39.9): ICD-10-CM

## 2023-01-03 DIAGNOSIS — G47.09 OTHER INSOMNIA: ICD-10-CM

## 2023-01-03 PROCEDURE — 3074F SYST BP LT 130 MM HG: CPT | Performed by: PHYSICIAN ASSISTANT

## 2023-01-03 PROCEDURE — 99214 OFFICE O/P EST MOD 30 MIN: CPT | Performed by: PHYSICIAN ASSISTANT

## 2023-01-03 PROCEDURE — 3079F DIAST BP 80-89 MM HG: CPT | Performed by: PHYSICIAN ASSISTANT

## 2023-01-03 RX ORDER — TRAZODONE HYDROCHLORIDE 50 MG/1
TABLET ORAL
Qty: 60 TABLET | Refills: 2 | Status: SHIPPED | OUTPATIENT
Start: 2023-01-03

## 2023-01-03 ASSESSMENT — ENCOUNTER SYMPTOMS
NAUSEA: 0
ALLERGIC/IMMUNOLOGIC NEGATIVE: 1
SHORTNESS OF BREATH: 0
DIARRHEA: 0
CHEST TIGHTNESS: 0
BACK PAIN: 0
STRIDOR: 0
COUGH: 0
WHEEZING: 0
EYES NEGATIVE: 1

## 2023-01-03 NOTE — PROGRESS NOTES
[facility]      Harsha Bahena         [id number]  1/3/2023   Chief Complaint   Patient presents with    Follow-up     1yr HENRY f/u w/[facility] download. Uses OTC Melatonin. Pt of Dr. Denis Bonilla     PAP Download:   Original or initial AHI: 23.3     Date of initial study: 8/15/2006      Compliant  63%     Noncompliant 37 %     PAP Type CPAP     Level  [id number]   Avg Hrs/Day 6hrs 11mins  AHI: 2.4   Recorded compliance dates , 11/29/22  to 12/28/225   Machine/Mfg:   [x] ResMed    [] Respironics/Dreamstation   Interface:   [x] Nasal    [] Nasal pillows   [] FFM      Provider:      [x] [facility]     [][employer]     [] [employer]    [] Vinicio Hands    [] [facility]               [] [facility]      [] [employer]    [] [address]      [] Other    Neck Size: 17  Mallampati 3  ESS:  12  SAQLI: 56    Here is a scan of the most recent download:      Presentation:   Malorie Combs presents for sleep medicine follow up for obstructive sleep apnea  Since the last visit, Malorie Combs is doing ok with PAP. Now complaining of insomnia. She is having trouble falling asleep. She has tried Melatonin and OCT with no benefit. Equipment issues: The pressure is  acceptable, the mask is acceptable     Sleep issues:  Do you feel better? No  More rested? No   Better concentration? no    Progress History:   Since last visit any new medical issues? Yes shoulder surgery  New ER or hospital visits? No  Any new or changes in medicines? No  Any new sleep medicines? No    Review of Systems -   Review of Systems   Constitutional:  Negative for activity change, appetite change, chills and fever. HENT:  Negative for congestion and postnasal drip. Eyes: Negative. Respiratory:  Negative for cough, chest tightness, shortness of breath, wheezing and stridor. Cardiovascular:  Negative for chest pain and leg swelling. Gastrointestinal:  Negative for diarrhea and nausea. Endocrine: Negative. Genitourinary: Negative. Musculoskeletal:  Positive for arthralgias. Negative for back pain. Skin: Negative. Allergic/Immunologic: Negative. Neurological: Negative. Negative for dizziness and light-headedness. Psychiatric/Behavioral: Negative. All other systems reviewed and are negative. Physical Exam:    BMI:  Body mass index is 38.31 kg/m². Wt Readings from Last 3 Encounters:   01/03/23 230 lb 3.2 oz (104.4 kg)   11/30/22 234 lb 6.4 oz (106.3 kg)   10/24/22 232 lb 6.4 oz (105.4 kg)     Weight stable / unchanged  Vitals: /84 (Site: Left Upper Arm, Position: Sitting, Cuff Size: Large Adult)   Pulse 83   Temp 98.1 °F (36.7 °C) (Oral)   Ht 5' 5\" (1.651 m)   Wt 230 lb 3.2 oz (104.4 kg)   SpO2 97% Comment: r/a  BMI 38.31 kg/m²       Physical Exam  Constitutional:       Appearance: Normal appearance. She is normal weight. HENT:      Head: Normocephalic and atraumatic. Right Ear: External ear normal.      Left Ear: External ear normal.      Nose: Nose normal.   Eyes:      Extraocular Movements: Extraocular movements intact. Conjunctiva/sclera: Conjunctivae normal.      Pupils: Pupils are equal, round, and reactive to light. Pulmonary:      Effort: Pulmonary effort is normal.   Musculoskeletal:      Cervical back: Normal range of motion and neck supple. Neurological:      General: No focal deficit present. Mental Status: She is alert and oriented to person, place, and time. Psychiatric:         Attention and Perception: Attention and perception normal.         Mood and Affect: Mood and affect normal.         Speech: Speech normal.         Behavior: Behavior normal. Behavior is cooperative. Thought Content: Thought content normal.         Cognition and Memory: Cognition normal.         Judgment: Judgment normal.       ASSESSMENT/DIAGNOSIS     Diagnosis Orders   1. HENRY on CPAP        2.  Obesity (BMI 30-39.9)        3. Other insomnia               Plan   Do you need any equipment today? Yes update supplies  - Download reviewed and discussed with patient  - She  was advised to continue current positive airway pressure therapy with above described pressure. - She  advised to keep good compliance with current recommended pressure to get optimal results and clinical improvement  - Recommend 7-9 hours of sleep with PAP  - She was advised to call [employer] regarding supplies if needed.   -She call my office for earlier appointment if needed for worsening of sleep symptoms.   - She was instructed on weight loss  - Kathryn uKo was educated about my impression and plan. Patient verbalizesunderstanding.   We will see Alanna Late back in: 3 months with download    Information added by my medical assistant/LPN was reviewed today       Toña Mack, [address] for pulmonary and Sleep Medicine  1/3/2023

## 2023-01-04 ENCOUNTER — HOSPITAL ENCOUNTER (OUTPATIENT)
Dept: MAMMOGRAPHY | Age: 58
Discharge: HOME | End: 2023-01-04
Payer: COMMERCIAL

## 2023-01-04 DIAGNOSIS — Z12.39 BREAST SCREENING: ICD-10-CM

## 2023-01-04 LAB — ALKALINE PHOSPHATASE ISOENZYMES: NORMAL

## 2023-01-04 PROCEDURE — 77067 SCR MAMMO BI INCL CAD: CPT

## 2023-03-06 ENCOUNTER — HOSPITAL ENCOUNTER (OUTPATIENT)
Age: 58
Discharge: HOME | End: 2023-03-06
Payer: COMMERCIAL

## 2023-03-06 DIAGNOSIS — E11.65 TYPE 2 DIABETES MELLITUS WITH HYPERGLYCEMIA, WITH LONG-TERM CURRENT USE OF INSULIN (HCC): ICD-10-CM

## 2023-03-06 DIAGNOSIS — Z79.4 TYPE 2 DIABETES MELLITUS WITH HYPERGLYCEMIA, WITH LONG-TERM CURRENT USE OF INSULIN (HCC): ICD-10-CM

## 2023-03-06 LAB
FOLATE SERPL-MCNC: 10.8 NG/ML (ref 4.8–24.2)
VIT B12 SERPL-MCNC: 398 PG/ML (ref 211–911)

## 2023-03-06 PROCEDURE — 82746 ASSAY OF FOLIC ACID SERUM: CPT

## 2023-03-06 PROCEDURE — 82607 VITAMIN B-12: CPT

## 2023-03-06 PROCEDURE — 36415 COLL VENOUS BLD VENIPUNCTURE: CPT

## 2023-03-20 ENCOUNTER — HOSPITAL ENCOUNTER (OUTPATIENT)
Dept: GENERAL RADIOLOGY | Age: 58
Discharge: HOME | End: 2023-03-20
Payer: COMMERCIAL

## 2023-03-20 ENCOUNTER — HOSPITAL ENCOUNTER (OUTPATIENT)
Age: 58
Discharge: HOME | End: 2023-03-20
Payer: COMMERCIAL

## 2023-03-20 DIAGNOSIS — M48.02 CERVICAL SPINAL STENOSIS: ICD-10-CM

## 2023-03-20 LAB
ANION GAP SERPL CALC-SCNC: 9 MEQ/L (ref 8–16)
APTT PPP: 31.7 SECONDS (ref 22–38)
BUN SERPL-MCNC: 12 MG/DL (ref 7–22)
CALCIUM SERPL-MCNC: 9.4 MG/DL (ref 8.5–10.5)
CHLORIDE SERPL-SCNC: 106 MEQ/L (ref 98–111)
CO2 SERPL-SCNC: 29 MEQ/L (ref 23–33)
CREAT SERPL-MCNC: 0.8 MG/DL (ref 0.4–1.2)
DEPRECATED MEAN GLUCOSE BLD GHB EST-ACNC: 147 MG/DL (ref 70–126)
DEPRECATED RDW RBC AUTO: 37 FL (ref 35–45)
EKG ATRIAL RATE: 71 BPM
EKG P AXIS: 13 DEGREES
EKG P-R INTERVAL: 148 MS
EKG Q-T INTERVAL: 392 MS
EKG QRS DURATION: 76 MS
EKG QTC CALCULATION (BAZETT): 425 MS
EKG R AXIS: 22 DEGREES
EKG T AXIS: 67 DEGREES
EKG VENTRICULAR RATE: 71 BPM
ERYTHROCYTE [DISTWIDTH] IN BLOOD BY AUTOMATED COUNT: 11.6 % (ref 11.5–14.5)
GFR SERPL CREATININE-BSD FRML MDRD: > 60 ML/MIN/1.73M2
GLUCOSE SERPL-MCNC: 120 MG/DL (ref 70–108)
HBA1C MFR BLD HPLC: 6.9 % (ref 4.4–6.4)
HCT VFR BLD AUTO: 40.1 % (ref 37–47)
HGB BLD-MCNC: 13.7 GM/DL (ref 12–16)
INR PPP: 1.02 (ref 0.85–1.13)
MCH RBC QN AUTO: 30.2 PG (ref 26–33)
MCHC RBC AUTO-ENTMCNC: 34.2 GM/DL (ref 32.2–35.5)
MCV RBC AUTO: 88.3 FL (ref 81–99)
MRSA DNA SPEC QL NAA+PROBE: NEGATIVE
PLATELET # BLD AUTO: 235 THOU/MM3 (ref 130–400)
PMV BLD AUTO: 11.2 FL (ref 9.4–12.4)
POTASSIUM SERPL-SCNC: 4.9 MEQ/L (ref 3.5–5.2)
RBC # BLD AUTO: 4.54 MILL/MM3 (ref 4.2–5.4)
SODIUM SERPL-SCNC: 144 MEQ/L (ref 135–145)
WBC # BLD AUTO: 5.4 THOU/MM3 (ref 4.8–10.8)

## 2023-03-20 PROCEDURE — 80048 BASIC METABOLIC PNL TOTAL CA: CPT

## 2023-03-20 PROCEDURE — 36415 COLL VENOUS BLD VENIPUNCTURE: CPT

## 2023-03-20 PROCEDURE — 93005 ELECTROCARDIOGRAM TRACING: CPT | Performed by: PHYSICIAN ASSISTANT

## 2023-03-20 PROCEDURE — 85730 THROMBOPLASTIN TIME PARTIAL: CPT

## 2023-03-20 PROCEDURE — 87641 MR-STAPH DNA AMP PROBE: CPT

## 2023-03-20 PROCEDURE — 85610 PROTHROMBIN TIME: CPT

## 2023-03-20 PROCEDURE — 83036 HEMOGLOBIN GLYCOSYLATED A1C: CPT

## 2023-03-20 PROCEDURE — 71046 X-RAY EXAM CHEST 2 VIEWS: CPT

## 2023-03-20 PROCEDURE — 85027 COMPLETE CBC AUTOMATED: CPT

## 2023-03-20 PROCEDURE — 93010 ELECTROCARDIOGRAM REPORT: CPT | Performed by: INTERNAL MEDICINE

## 2023-04-01 ENCOUNTER — HOSPITAL ENCOUNTER (EMERGENCY)
Age: 58
Discharge: HOME | End: 2023-04-01
Payer: COMMERCIAL

## 2023-04-01 VITALS
WEIGHT: 210 LBS | HEIGHT: 65 IN | SYSTOLIC BLOOD PRESSURE: 183 MMHG | TEMPERATURE: 97.5 F | DIASTOLIC BLOOD PRESSURE: 86 MMHG | RESPIRATION RATE: 16 BRPM | OXYGEN SATURATION: 99 % | BODY MASS INDEX: 34.99 KG/M2 | HEART RATE: 81 BPM

## 2023-04-01 DIAGNOSIS — M79.2 NEUROPATHIC PAIN OF RIGHT FOOT: Primary | ICD-10-CM

## 2023-04-01 PROCEDURE — 99202 OFFICE O/P NEW SF 15 MIN: CPT | Performed by: NURSE PRACTITIONER

## 2023-04-01 PROCEDURE — 99213 OFFICE O/P EST LOW 20 MIN: CPT

## 2023-04-01 RX ORDER — GABAPENTIN 100 MG/1
100 CAPSULE ORAL 3 TIMES DAILY
Qty: 30 CAPSULE | Refills: 0 | Status: SHIPPED | OUTPATIENT
Start: 2023-04-01 | End: 2023-04-04 | Stop reason: SDUPTHER

## 2023-04-01 RX ORDER — ACETAMINOPHEN 500 MG
1000 TABLET ORAL EVERY 6 HOURS PRN
COMMUNITY

## 2023-04-01 ASSESSMENT — PAIN - FUNCTIONAL ASSESSMENT: PAIN_FUNCTIONAL_ASSESSMENT: 0-10

## 2023-04-01 ASSESSMENT — PAIN DESCRIPTION - LOCATION: LOCATION: FOOT

## 2023-04-01 ASSESSMENT — PAIN DESCRIPTION - DESCRIPTORS: DESCRIPTORS: STABBING;TINGLING

## 2023-04-01 ASSESSMENT — PAIN SCALES - GENERAL: PAINLEVEL_OUTOF10: 10

## 2023-04-01 ASSESSMENT — PAIN DESCRIPTION - ORIENTATION: ORIENTATION: RIGHT

## 2023-04-01 ASSESSMENT — PAIN DESCRIPTION - FREQUENCY: FREQUENCY: CONTINUOUS

## 2023-04-01 ASSESSMENT — PAIN DESCRIPTION - PAIN TYPE: TYPE: ACUTE PAIN

## 2023-04-01 NOTE — ED TRIAGE NOTES
Arrives to [facility] for the evaluation of right foot pain without injury. States the pain has been on going for \"several weeks\". Is a diabetic and follows up with Dr Elizabeth Gerard. At this time pain is rated 10/10 in severity and is described as a constant stabbing. States she has not been diagnosed with Peripheral neuropathy. Reports tingling in the right foot as well. Took Tylenol 1000 mg today around 1730. Has also tried a cream bought off [address] for neuropathy but it is not working. Also reports mild swelling in the right foot. BP elevated. Denies headache, dizziness, chest pain, fever, chills, SOB. Laying on cot at this time with feet elevated. Waiting provider to assess.

## 2023-04-01 NOTE — ED PROVIDER NOTES
[address]  Urgent Care Encounter       CHIEF COMPLAINT       Chief Complaint   Patient presents with    Foot Pain     Right- ongoing for several weeks (Hx of Diabetes)       Nurses Notes reviewed and I agree except as noted in the HPI. HISTORY OF PRESENT ILLNESS   Mendez Severino is a 65 y.o. female who presents with complaints of nerve pain in her right fourth digit of her foot. This started 3 weeks ago and has been worsening. She states she \"cannot stand it\". She has been \"poking a scissors and it\" and is complaining of severe pain 10 out of 10. She admits to losing her toenails as well. She reports following with an endocrinologist and her PCP to manage her diabetes. She also admits her blood pressure is elevated and is abnormal for her. She has tried taking acetaminophen and ibuprofen as well as IcyHot to her toe. The treatment has been ineffective. The history is provided by the patient. REVIEW OF SYSTEMS     Review of Systems   Constitutional:  Negative for fever. Cardiovascular:  Negative for chest pain and palpitations. Musculoskeletal:  Positive for arthralgias (right 4th toe) and myalgias. Negative for joint swelling. Neurological:  Negative for weakness. PAST MEDICAL HISTORY         Diagnosis Date    Chronic back pain     Depression 3/24/2013    Diabetes mellitus ([address]     GERD (gastroesophageal reflux disease)     Hypertension     Obesity 3/24/2013    HENRY on CPAP     Osteoarthritis     PONV (postoperative nausea and vomiting)     Prolonged emergence from general anesthesia     Pyelonephritis 3/24/2013    Unspecified sleep apnea     with C-Pap    UTI (urinary tract infection)        SURGICALHISTORY     Patient  has a past surgical history that includes Anterior cruciate ligament repair (Left, 2002); Endometrial ablation (2008); cyst removal (2000); Lobectomy, Thyroid (05/21/2010); Spinal fusion (02/28/2013); back surgery; Spinal fusion (10/03/2017);  Nasal oriented to person, place, and time. Sensory: Sensory deficit (right toe) present. DIAGNOSTIC RESULTS     Labs:No results found for this visit on 04/01/23. IMAGING:  None    EKG:  None    URGENT CARE COURSE:     Vitals:    04/01/23 1825 04/01/23 1841   BP: (!) 204/103 (!) 183/86   Pulse: 86 81   Resp: 16    Temp: 97.5 °F (36.4 °C)    TempSrc: Temporal    SpO2: 99%    Weight: 210 lb (95.3 kg)    Height: 5' 5\" (1.651 m)        Medications - No data to display         PROCEDURES:  None    FINAL IMPRESSION      1. Neuropathic pain of right foot      DISPOSITION/ PLAN   DISPOSITION Decision To Discharge 04/01/2023 06:38:01 PM     Patient presents with neuropathic pain in the right foot specifically at the base of her fourth digit. Patient's blood pressure is initially elevated upon presentation likely due to acute discomfort. Opted to treat patient with gabapentin. Advised to take over-the-counter acetaminophen. Recommended follow-up with her PCP next week to reevaluate her blood pressure. Her blood pressure was noted to improve upon reevaluation prior to discharge. PATIENT REFERRED TO:  MD NORA Gonzalez [address] / [place]  [id number]      DISCHARGE MEDICATIONS:  Discharge Medication List as of 4/1/2023  6:41 PM        START taking these medications    Details   gabapentin (NEURONTIN) 100 MG capsule Take 1 capsule by mouth 3 times daily for 10 days.  Intended supply: 90 days, Disp-30 capsule, R-0Normal             Discharge Medication List as of 4/1/2023  6:41 PM          Discharge Medication List as of 4/1/2023  6:41 PM          SILVERIO Salinas CNP    (Please note that portions of this note were completed with a voice recognition program. Efforts were made to edit the dictations but occasionally words are mis-transcribed.)            SILVERIO Salinas CNP  04/01/23 [id number]

## 2023-08-18 ENCOUNTER — HOSPITAL ENCOUNTER (OUTPATIENT)
Dept: ULTRASOUND IMAGING | Age: 58
Discharge: HOME | End: 2023-08-18
Attending: FAMILY MEDICINE
Payer: COMMERCIAL

## 2023-08-18 DIAGNOSIS — I10 PRIMARY HYPERTENSION: ICD-10-CM

## 2023-08-18 PROCEDURE — 76770 US EXAM ABDO BACK WALL COMP: CPT

## 2023-08-18 PROCEDURE — 93975 VASCULAR STUDY: CPT

## 2023-08-31 ENCOUNTER — HOSPITAL ENCOUNTER (OUTPATIENT)
Age: 58
Discharge: HOME | End: 2023-08-31
Payer: COMMERCIAL

## 2023-08-31 LAB
EKG ATRIAL RATE: 70 BPM
EKG P AXIS: 17 DEGREES
EKG P-R INTERVAL: 156 MS
EKG Q-T INTERVAL: 380 MS
EKG QRS DURATION: 82 MS
EKG QTC CALCULATION (BAZETT): 410 MS
EKG R AXIS: 26 DEGREES
EKG T AXIS: 64 DEGREES
EKG VENTRICULAR RATE: 70 BPM

## 2023-08-31 PROCEDURE — 93010 ELECTROCARDIOGRAM REPORT: CPT | Performed by: NUCLEAR MEDICINE

## 2023-08-31 PROCEDURE — 93005 ELECTROCARDIOGRAM TRACING: CPT | Performed by: ORTHOPAEDIC SURGERY

## 2023-11-07 ENCOUNTER — HOSPITAL ENCOUNTER (OUTPATIENT)
Age: 58
Discharge: HOME | End: 2023-11-07
Payer: COMMERCIAL

## 2023-11-07 DIAGNOSIS — E11.65 TYPE 2 DIABETES MELLITUS WITH HYPERGLYCEMIA, WITH LONG-TERM CURRENT USE OF INSULIN (HCC): ICD-10-CM

## 2023-11-07 DIAGNOSIS — Z79.4 TYPE 2 DIABETES MELLITUS WITH HYPERGLYCEMIA, WITH LONG-TERM CURRENT USE OF INSULIN (HCC): ICD-10-CM

## 2023-11-07 LAB
ALBUMIN SERPL BCG-MCNC: 4.1 G/DL (ref 3.5–5.1)
ALP SERPL-CCNC: 92 U/L (ref 38–126)
ALT SERPL W/O P-5'-P-CCNC: 25 U/L (ref 11–66)
AST SERPL-CCNC: 22 U/L (ref 5–40)
BILIRUB CONJ SERPL-MCNC: < 0.2 MG/DL (ref 0–0.3)
BILIRUB SERPL-MCNC: 0.7 MG/DL (ref 0.3–1.2)
CHOLEST SERPL-MCNC: 127 MG/DL (ref 100–199)
HDLC SERPL-MCNC: 43 MG/DL
LDLC SERPL CALC-MCNC: 65 MG/DL
PROT SERPL-MCNC: 7.4 G/DL (ref 6.1–8)
TRIGL SERPL-MCNC: 95 MG/DL (ref 0–199)

## 2023-11-07 PROCEDURE — 80061 LIPID PANEL: CPT

## 2023-11-07 PROCEDURE — 36415 COLL VENOUS BLD VENIPUNCTURE: CPT

## 2023-11-07 PROCEDURE — 80076 HEPATIC FUNCTION PANEL: CPT

## 2024-01-15 ENCOUNTER — HOSPITAL ENCOUNTER (OUTPATIENT)
Dept: WOMENS IMAGING | Age: 59
Discharge: HOME | End: 2024-01-15
Payer: COMMERCIAL

## 2024-01-15 DIAGNOSIS — Z12.31 VISIT FOR SCREENING MAMMOGRAM: ICD-10-CM

## 2024-01-15 PROCEDURE — 77067 SCR MAMMO BI INCL CAD: CPT

## 2024-01-15 PROCEDURE — 77063 BREAST TOMOSYNTHESIS BI: CPT

## 2024-01-25 ENCOUNTER — OFFICE VISIT (OUTPATIENT)
Age: 59
End: 2024-01-25
Payer: COMMERCIAL

## 2024-01-25 VITALS
SYSTOLIC BLOOD PRESSURE: 126 MMHG | BODY MASS INDEX: 36.46 KG/M2 | WEIGHT: 218.8 LBS | HEIGHT: 65 IN | RESPIRATION RATE: 16 BRPM | HEART RATE: 67 BPM | DIASTOLIC BLOOD PRESSURE: 80 MMHG

## 2024-01-25 DIAGNOSIS — Z79.4 TYPE 2 DIABETES MELLITUS WITH HYPERGLYCEMIA, WITH LONG-TERM CURRENT USE OF INSULIN (HCC): Primary | ICD-10-CM

## 2024-01-25 DIAGNOSIS — E78.2 MIXED HYPERLIPIDEMIA: ICD-10-CM

## 2024-01-25 DIAGNOSIS — E11.65 TYPE 2 DIABETES MELLITUS WITH HYPERGLYCEMIA, WITH LONG-TERM CURRENT USE OF INSULIN (HCC): Primary | ICD-10-CM

## 2024-01-25 PROCEDURE — 99214 OFFICE O/P EST MOD 30 MIN: CPT | Performed by: INTERNAL MEDICINE

## 2024-01-25 PROCEDURE — 95251 CONT GLUC MNTR ANALYSIS I&R: CPT | Performed by: INTERNAL MEDICINE

## 2024-01-25 PROCEDURE — 3074F SYST BP LT 130 MM HG: CPT | Performed by: INTERNAL MEDICINE

## 2024-01-25 PROCEDURE — 3079F DIAST BP 80-89 MM HG: CPT | Performed by: INTERNAL MEDICINE

## 2024-01-25 RX ORDER — BLOOD-GLUCOSE SENSOR
EACH MISCELLANEOUS
Qty: 6 EACH | Refills: 2 | Status: SHIPPED | OUTPATIENT
Start: 2024-01-25

## 2024-01-25 RX ORDER — TIRZEPATIDE 5 MG/.5ML
5 INJECTION, SOLUTION SUBCUTANEOUS WEEKLY
Qty: 4 ADJUSTABLE DOSE PRE-FILLED PEN SYRINGE | Refills: 3 | Status: SHIPPED | OUTPATIENT
Start: 2024-01-25

## 2024-01-25 NOTE — PROGRESS NOTES
CGMSINTERPRETATION    CGMS interpretation:  The patient is checking blood sugars 4 times a day using freestyle natalia continuous glucose monitoring system.  His download includes data from 1/12/2024 through 1/25/2024 and is sufficient for interpretation.  The CGM was active 93% of the time.  Average blood glucose is 151 mg/dL with a coefficient of variation of 25.3% and glucose management indicator of 6.9%  The patient was within the target range 78 percent of the time.  Hypoglycemia:  Blood glucose range between 54 mg/dL and 70mg/dL: 0%  Blood glucose is below 54 mg/dL: 0%  Hyperglycemia:  Blood glucoses between 181 and 250 mg/dL: 21%  Blood glucoses above 250 mg/dL: 1%  In summary, patient is having very good glycemic control with only slight risk of hyperglycemia later in the day.  Recommendations: Please refer to the assessment and plan section.  
HYSTERECTOMY, TOTAL ABDOMINAL (CERVIX REMOVED)  11/09/2016    partial, Robotic    LOBECTOMY, THYROID  05/21/2010    right lobectomy    NASAL SINUS SURGERY  11/2017    Dr. Granados in [facility]    SHOULDER SURGERY Right 09/2021    rotator cuff    SHOULDER SURGERY Right 11/2022    [facility] -  Dr. Mao    SPINAL CORD STIMULATOR IMPLANTATION N/A 02/22/2019    T7-8 SPINAL CORD STIMULATOR IMPLANT performed by Luis Vasquez MD at [facility] OR    SPINAL CORD STIMULATOR IMPLANTATION N/A 03/21/2019    THORACIC I&D WITH SPINAL STIMULATOR AND BATTERY REMOVAL performed by Luis Vasquez MD at [facility] OR    SPINAL CORD STIMULATOR IMPLANTATION N/A 08/09/2019    SPINAL CORD STIMULATOR INSERTION performed by Luis Vasquez MD at [facility] OR    SPINAL FUSION  02/28/2013    c5, c6, c7    SPINAL FUSION  10/03/2017    UPPER GASTROINTESTINAL ENDOSCOPY N/A 09/06/2019    EGD BIOPSY performed by Ministerio Cox MD at [facility] Endoscopy    US BREAST BIOPSY W LOC DEVICE 1ST LESION LEFT Left 11/12/2009    benign       Family History   Problem Relation Age of Onset    Arthritis Mother     High Blood Pressure Mother     Stroke Mother     Right Breast Cancer Mother 47    Left Breast Cancer Mother 77    BRCA 1 Negative Mother     BRCA 2 Negative Mother     Early Death Father 22        mva    Heart Attack Maternal Grandmother     Heart Attack Maternal Grandfather     Diabetes Neg Hx     Heart Disease Neg Hx      Social History     Tobacco Use    Smoking status: Never    Smokeless tobacco: Never    Tobacco comments:     Never smoker   Substance Use Topics    Alcohol use: Not Currently     Comment: very rare      Current Outpatient Medications   Medication Sig Dispense Refill    Tirzepatide (MOUNJARO) 5 MG/0.5ML SOPN SC injection Inject 0.5 mLs into the skin once a week 4 Adjustable Dose Pre-filled Pen Syringe 3    Continuous Blood Gluc Sensor (FREESTYLE SYLVESTER 3 SENSOR) MISC Check blood sugars 4x/day 6 each 2    omeprazole (PRILOSEC) 40 MG delayed

## 2024-02-05 ENCOUNTER — HOSPITAL ENCOUNTER (OUTPATIENT)
Age: 59
Discharge: HOME | End: 2024-02-05
Payer: COMMERCIAL

## 2024-02-05 DIAGNOSIS — E11.65 TYPE 2 DIABETES MELLITUS WITH HYPERGLYCEMIA, WITH LONG-TERM CURRENT USE OF INSULIN (HCC): ICD-10-CM

## 2024-02-05 DIAGNOSIS — Z79.4 TYPE 2 DIABETES MELLITUS WITH HYPERGLYCEMIA, WITH LONG-TERM CURRENT USE OF INSULIN (HCC): ICD-10-CM

## 2024-02-05 LAB
ALBUMIN SERPL BCG-MCNC: 4.1 G/DL (ref 3.5–5.1)
ALP SERPL-CCNC: 93 U/L (ref 38–126)
ALT SERPL W/O P-5'-P-CCNC: 25 U/L (ref 11–66)
ANION GAP SERPL CALC-SCNC: 8 MEQ/L (ref 8–16)
AST SERPL-CCNC: 20 U/L (ref 5–40)
BILIRUB SERPL-MCNC: 0.4 MG/DL (ref 0.3–1.2)
BUN SERPL-MCNC: 12 MG/DL (ref 7–22)
CALCIUM SERPL-MCNC: 9.2 MG/DL (ref 8.5–10.5)
CHLORIDE SERPL-SCNC: 107 MEQ/L (ref 98–111)
CO2 SERPL-SCNC: 28 MEQ/L (ref 23–33)
CREAT SERPL-MCNC: 0.8 MG/DL (ref 0.4–1.2)
CREAT UR-MCNC: 185.4 MG/DL
DEPRECATED MEAN GLUCOSE BLD GHB EST-ACNC: 123 MG/DL (ref 70–126)
GFR SERPL CREATININE-BSD FRML MDRD: > 60 ML/MIN/1.73M2
GLUCOSE SERPL-MCNC: 113 MG/DL (ref 70–108)
HBA1C MFR BLD HPLC: 6.1 % (ref 4.4–6.4)
MICROALBUMIN UR-MCNC: 1.32 MG/DL
MICROALBUMIN/CREAT RATIO PNL UR: 7 MG/G (ref 0–30)
POTASSIUM SERPL-SCNC: 4.3 MEQ/L (ref 3.5–5.2)
PROT SERPL-MCNC: 6.9 G/DL (ref 6.1–8)
SODIUM SERPL-SCNC: 143 MEQ/L (ref 135–145)

## 2024-02-05 PROCEDURE — 82043 UR ALBUMIN QUANTITATIVE: CPT

## 2024-02-05 PROCEDURE — 83036 HEMOGLOBIN GLYCOSYLATED A1C: CPT

## 2024-02-05 PROCEDURE — 36415 COLL VENOUS BLD VENIPUNCTURE: CPT

## 2024-02-05 PROCEDURE — 80053 COMPREHEN METABOLIC PANEL: CPT

## 2024-04-02 DIAGNOSIS — Z79.4 TYPE 2 DIABETES MELLITUS WITH HYPERGLYCEMIA, WITH LONG-TERM CURRENT USE OF INSULIN (HCC): ICD-10-CM

## 2024-04-02 DIAGNOSIS — E11.65 TYPE 2 DIABETES MELLITUS WITH HYPERGLYCEMIA, WITH LONG-TERM CURRENT USE OF INSULIN (HCC): ICD-10-CM

## 2024-04-02 RX ORDER — BLOOD-GLUCOSE SENSOR
EACH MISCELLANEOUS
Qty: 6 EACH | Refills: 2 | Status: SHIPPED | OUTPATIENT
Start: 2024-04-02

## 2024-05-06 ENCOUNTER — OFFICE VISIT (OUTPATIENT)
Age: 59
End: 2024-05-06
Payer: COMMERCIAL

## 2024-05-06 VITALS
RESPIRATION RATE: 16 BRPM | SYSTOLIC BLOOD PRESSURE: 118 MMHG | BODY MASS INDEX: 34.52 KG/M2 | HEART RATE: 58 BPM | WEIGHT: 207.2 LBS | HEIGHT: 65 IN | DIASTOLIC BLOOD PRESSURE: 78 MMHG

## 2024-05-06 DIAGNOSIS — E78.2 MIXED HYPERLIPIDEMIA: ICD-10-CM

## 2024-05-06 DIAGNOSIS — E11.65 TYPE 2 DIABETES MELLITUS WITH HYPERGLYCEMIA, WITH LONG-TERM CURRENT USE OF INSULIN (HCC): Primary | ICD-10-CM

## 2024-05-06 DIAGNOSIS — E66.9 OBESITY (BMI 30-39.9): ICD-10-CM

## 2024-05-06 DIAGNOSIS — Z79.4 TYPE 2 DIABETES MELLITUS WITH HYPERGLYCEMIA, WITH LONG-TERM CURRENT USE OF INSULIN (HCC): Primary | ICD-10-CM

## 2024-05-06 PROCEDURE — 3074F SYST BP LT 130 MM HG: CPT | Performed by: INTERNAL MEDICINE

## 2024-05-06 PROCEDURE — 3078F DIAST BP <80 MM HG: CPT | Performed by: INTERNAL MEDICINE

## 2024-05-06 PROCEDURE — 3044F HG A1C LEVEL LT 7.0%: CPT | Performed by: INTERNAL MEDICINE

## 2024-05-06 PROCEDURE — 99214 OFFICE O/P EST MOD 30 MIN: CPT | Performed by: INTERNAL MEDICINE

## 2024-05-06 RX ORDER — DULOXETIN HYDROCHLORIDE 60 MG/1
60 CAPSULE, DELAYED RELEASE ORAL DAILY
COMMUNITY

## 2024-05-06 RX ORDER — TIRZEPATIDE 7.5 MG/.5ML
7.5 INJECTION, SOLUTION SUBCUTANEOUS WEEKLY
Qty: 6 EACH | Refills: 1 | Status: SHIPPED | OUTPATIENT
Start: 2024-05-06 | End: 2024-08-26

## 2024-05-06 ASSESSMENT — ENCOUNTER SYMPTOMS
BACK PAIN: 0
WHEEZING: 0
DIARRHEA: 0
SHORTNESS OF BREATH: 0
TROUBLE SWALLOWING: 0
CONSTIPATION: 0
BLOOD IN STOOL: 0
EYE PAIN: 0
NAUSEA: 0
COUGH: 0
VOMITING: 0

## 2024-05-06 NOTE — PROGRESS NOTES
Patient Name: Toña Valentine  YOB: 1965  MRN: [id number]  Office visit date: 5/6/2024    Chief Complaint: Follow-up (Type 2 diabetes mellitus with hyperglycemia, with long-term current use of insulin)      Subjective/Objective:    HPI:     Toña Valentine is a 58 y.o. female who presents for an established patient visit. She is here for evaluation of Follow-up (Type 2 diabetes mellitus with hyperglycemia, with long-term current use of insulin)    Last seen in 1/2024 for DM with freestyle jessica. Was on Lispro but not taking any more, Tresiba 45 before down to 30 units units and Mounjaro 5 mg weekly. A1C 2/5/24 6.1. Last lipid panel Choles 127, Triglyceride 95, HDL 43, LDL 65 (11/2023). Urine Micro Albumin/Cr 7 wnl. Reported 11 lbs weight loss since she on Mounjaro. Has shot on Tuesday and stat having hungry since Thursday. Denied any side affects of Mounjaro such as GI Symptom. Last eyes exam 10/2023 wnl. Has sciatica with 5th toe on right LE numbness and seen Pain management (Duloxetine and Gabapentin). No opening skin    CGMSINTERPRETATION    CGMS interpretation:  The patient is checking blood sugars 4 times a day using freestyle Jessica continuous glucose monitoring system.  His download includes data from 4/23/24 through 5/6/24 and is sufficient for interpretation.  The CGM was active 96 % of the time.  Average blood glucose is 116 mg/dL with a coefficient of variation of 19.8 % and glucose management indicator of 6.1 %  The patient was within the target range 98 percent of the time.  Hypoglycemia:  Blood glucose range between 54 mg/dL and 70mg/dL: 1 %  Blood glucose is below 54 mg/dL: 0 %  Hyperglycemia:  Blood glucoses between 181 and 250 mg/dL: 1 %  Blood glucoses above 250 mg/dL: 0 %  In summary, well control   Recommendations: Reduce Tresiba and increase Mounjaro     Past Medical History:   Diagnosis Date    Chronic back pain     Depression 3/24/2013    Diabetes mellitus (HCC)     GERD

## 2024-05-13 ENCOUNTER — TELEPHONE (OUTPATIENT)
Age: 59
End: 2024-05-13

## 2024-05-13 NOTE — TELEPHONE ENCOUNTER
Patient called in stating that their is a nationwide shortage of mounjaro. Patient has called multiple places.

## 2024-05-14 ENCOUNTER — CLINICAL DOCUMENTATION (OUTPATIENT)
Age: 59
End: 2024-05-14

## 2024-05-14 DIAGNOSIS — E11.65 TYPE 2 DIABETES MELLITUS WITH HYPERGLYCEMIA, WITH LONG-TERM CURRENT USE OF INSULIN (HCC): Primary | ICD-10-CM

## 2024-05-14 DIAGNOSIS — Z79.4 TYPE 2 DIABETES MELLITUS WITH HYPERGLYCEMIA, WITH LONG-TERM CURRENT USE OF INSULIN (HCC): Primary | ICD-10-CM

## 2024-05-14 RX ORDER — SEMAGLUTIDE 0.68 MG/ML
INJECTION, SOLUTION SUBCUTANEOUS
Qty: 3 ML | Refills: 2 | Status: SHIPPED | OUTPATIENT
Start: 2024-05-14

## 2024-05-21 ENCOUNTER — HOSPITAL ENCOUNTER (OUTPATIENT)
Dept: GENERAL RADIOLOGY | Age: 59
Discharge: HOME | End: 2024-05-21
Payer: COMMERCIAL

## 2024-05-21 ENCOUNTER — HOSPITAL ENCOUNTER (OUTPATIENT)
Age: 59
Discharge: HOME | End: 2024-05-21
Payer: COMMERCIAL

## 2024-05-21 DIAGNOSIS — Z01.811 PRE-OP CHEST EXAM: ICD-10-CM

## 2024-05-21 DIAGNOSIS — Z79.4 TYPE 2 DIABETES MELLITUS WITH HYPERGLYCEMIA, WITH LONG-TERM CURRENT USE OF INSULIN (HCC): ICD-10-CM

## 2024-05-21 DIAGNOSIS — E66.9 OBESITY (BMI 30-39.9): ICD-10-CM

## 2024-05-21 DIAGNOSIS — E11.65 TYPE 2 DIABETES MELLITUS WITH HYPERGLYCEMIA, WITH LONG-TERM CURRENT USE OF INSULIN (HCC): ICD-10-CM

## 2024-05-21 LAB
ALBUMIN SERPL BCG-MCNC: 4.1 G/DL (ref 3.5–5.1)
ALP SERPL-CCNC: 80 U/L (ref 38–126)
ALT SERPL W/O P-5'-P-CCNC: 20 U/L (ref 11–66)
ANION GAP SERPL CALC-SCNC: 12 MEQ/L (ref 8–16)
APTT PPP: 32.9 SECONDS (ref 22–38)
AST SERPL-CCNC: 21 U/L (ref 5–40)
BASOPHILS ABSOLUTE: 0 THOU/MM3 (ref 0–0.1)
BASOPHILS NFR BLD AUTO: 0.8 %
BILIRUB SERPL-MCNC: 0.6 MG/DL (ref 0.3–1.2)
BUN SERPL-MCNC: 8 MG/DL (ref 7–22)
CALCIUM SERPL-MCNC: 9.4 MG/DL (ref 8.5–10.5)
CHLORIDE SERPL-SCNC: 106 MEQ/L (ref 98–111)
CO2 SERPL-SCNC: 25 MEQ/L (ref 23–33)
CREAT SERPL-MCNC: 0.8 MG/DL (ref 0.4–1.2)
DEPRECATED RDW RBC AUTO: 42 FL (ref 35–45)
EOSINOPHIL NFR BLD AUTO: 4.5 %
EOSINOPHILS ABSOLUTE: 0.2 THOU/MM3 (ref 0–0.4)
ERYTHROCYTE [DISTWIDTH] IN BLOOD BY AUTOMATED COUNT: 12.8 % (ref 11.5–14.5)
GFR SERPL CREATININE-BSD FRML MDRD: 85 ML/MIN/1.73M2
GLUCOSE SERPL-MCNC: 138 MG/DL (ref 70–108)
HCT VFR BLD AUTO: 39.6 % (ref 37–47)
HGB BLD-MCNC: 12.9 GM/DL (ref 12–16)
IMM GRANULOCYTES # BLD AUTO: 0.01 THOU/MM3 (ref 0–0.07)
IMM GRANULOCYTES NFR BLD AUTO: 0.2 %
INR PPP: 0.95 (ref 0.85–1.13)
LYMPHOCYTES ABSOLUTE: 1.4 THOU/MM3 (ref 1–4.8)
LYMPHOCYTES NFR BLD AUTO: 30.8 %
MCH RBC QN AUTO: 29.5 PG (ref 26–33)
MCHC RBC AUTO-ENTMCNC: 32.6 GM/DL (ref 32.2–35.5)
MCV RBC AUTO: 90.6 FL (ref 81–99)
MONOCYTES ABSOLUTE: 0.4 THOU/MM3 (ref 0.4–1.3)
MONOCYTES NFR BLD AUTO: 7.9 %
MRSA DNA SPEC QL NAA+PROBE: NEGATIVE
NEUTROPHILS ABSOLUTE: 2.6 THOU/MM3 (ref 1.8–7.7)
NEUTROPHILS NFR BLD AUTO: 55.8 %
NRBC BLD AUTO-RTO: 0 /100 WBC
PLATELET # BLD AUTO: 213 THOU/MM3 (ref 130–400)
PMV BLD AUTO: 11.6 FL (ref 9.4–12.4)
POTASSIUM SERPL-SCNC: 4 MEQ/L (ref 3.5–5.2)
PROT SERPL-MCNC: 6.9 G/DL (ref 6.1–8)
RBC # BLD AUTO: 4.37 MILL/MM3 (ref 4.2–5.4)
SODIUM SERPL-SCNC: 143 MEQ/L (ref 135–145)
WBC # BLD AUTO: 4.7 THOU/MM3 (ref 4.8–10.8)

## 2024-05-21 PROCEDURE — 71046 X-RAY EXAM CHEST 2 VIEWS: CPT

## 2024-05-21 PROCEDURE — 85025 COMPLETE CBC W/AUTO DIFF WBC: CPT

## 2024-05-21 PROCEDURE — 93005 ELECTROCARDIOGRAM TRACING: CPT | Performed by: PHYSICIAN ASSISTANT

## 2024-05-21 PROCEDURE — 36415 COLL VENOUS BLD VENIPUNCTURE: CPT

## 2024-05-21 PROCEDURE — 87641 MR-STAPH DNA AMP PROBE: CPT

## 2024-05-21 PROCEDURE — 85730 THROMBOPLASTIN TIME PARTIAL: CPT

## 2024-05-21 PROCEDURE — 85610 PROTHROMBIN TIME: CPT

## 2024-05-21 PROCEDURE — 80053 COMPREHEN METABOLIC PANEL: CPT

## 2024-05-22 LAB
EKG ATRIAL RATE: 63 BPM
EKG P AXIS: 49 DEGREES
EKG P-R INTERVAL: 152 MS
EKG Q-T INTERVAL: 414 MS
EKG QRS DURATION: 76 MS
EKG QTC CALCULATION (BAZETT): 423 MS
EKG R AXIS: 56 DEGREES
EKG T AXIS: 64 DEGREES
EKG VENTRICULAR RATE: 63 BPM

## 2024-05-22 PROCEDURE — 93010 ELECTROCARDIOGRAM REPORT: CPT | Performed by: INTERNAL MEDICINE

## 2024-05-29 NOTE — FLOWSHEET NOTE
Lumbar Spine Surgery Pre-op Instructions  Nothing to eat or drink after midnight except sips of water to take medications, this includes no mints, chewing gum or ice chips  No Smoking or chewing tobacco 24 hours before surgery  Bring your medications in the original bottles   Bring photo ID and any health insurance cards  Wear comfortable clean loose fitting clothing  Do not wear any jewelry, make up, body piercings or contact lenses  Bring your CPAP machine if you have one she is brining   Wear clean clothes to bed and place fresh clean sheets and pillowcases on your bed the night before surgery  Routine preop instructions given for pain, hand hygiene, fall prevention, infection prevention, anesthesia, cough and deep breath, and progressive diet and ambulation  Showering:  Shower 2 days before, day before and morning of surgery using the antibacterial   Do not shave the surgical area! If needed, your nurse will use clippers to remove any excess hair at the surgical site when you come in for surgery. Do not use a razor on the site of your surgery for at least 2 days prior to surgery because shaving can leave tiny nicks in the skin which may allow germs to enter and cause infection.  Remember- Post Op NO BLTS ; No Bending, No Lifting, No Twisting until Dr say its ok.    Pain back 4/10 Pt understands pain scale used at hospital

## 2024-05-29 NOTE — PROGRESS NOTES
Hold for 7 days before surgery Mounjaro, Ozempic, vitamin and herbals   Hold day of surgery Humalog , Losartan, and Tresiba       Take day of surgery Metoprolol, and Apresoline .Bringing in CPAP machine

## 2024-05-31 NOTE — PROGRESS NOTES
Called neelima at [facility] for Clarification for Clearance on DR Hicks he was waiting on clearance on pending testing

## 2024-06-01 ENCOUNTER — HOSPITAL ENCOUNTER (OUTPATIENT)
Age: 59
Discharge: HOME | End: 2024-06-01
Payer: COMMERCIAL

## 2024-06-01 LAB
ABO: NORMAL
ANTIBODY SCREEN: NORMAL
RH FACTOR: NORMAL

## 2024-06-01 PROCEDURE — 86901 BLOOD TYPING SEROLOGIC RH(D): CPT

## 2024-06-01 PROCEDURE — 36415 COLL VENOUS BLD VENIPUNCTURE: CPT

## 2024-06-01 PROCEDURE — 86900 BLOOD TYPING SEROLOGIC ABO: CPT

## 2024-06-01 PROCEDURE — 86850 RBC ANTIBODY SCREEN: CPT

## 2024-06-03 ENCOUNTER — ANESTHESIA EVENT (OUTPATIENT)
Dept: OPERATING ROOM | Age: 59
End: 2024-06-03
Payer: COMMERCIAL

## 2024-06-03 NOTE — PROGRESS NOTES
Called Dr Hicks office for clearance update on patient for surgery for tomorrow.  They said he will update today and fax to us

## 2024-06-03 NOTE — H&P
WBC:    Lab Results   Component Value Date/Time    WBC 4.7 05/21/2024 09:47 AM     Hemoglobin/Hematocrit:    Lab Results   Component Value Date/Time    HGB 12.9 05/21/2024 09:47 AM    HCT 39.6 05/21/2024 09:47 AM     CMP:    Lab Results   Component Value Date/Time     05/21/2024 09:47 AM    K 4.0 05/21/2024 09:47 AM     05/21/2024 09:47 AM    CO2 25 05/21/2024 09:47 AM    BUN 8 05/21/2024 09:47 AM    CREATININE 0.8 05/21/2024 09:47 AM    LABGLOM 85 05/21/2024 09:47 AM    LABGLOM >60 02/05/2024 08:24 AM    LABGLOM 81 09/01/2023 12:00 AM    GLUCOSE 138 05/21/2024 09:47 AM    CALCIUM 9.4 05/21/2024 09:47 AM    BILITOT 0.6 05/21/2024 09:47 AM    ALKPHOS 80 05/21/2024 09:47 AM    AST 21 05/21/2024 09:47 AM    ALT 20 05/21/2024 09:47 AM       Radiology:  N/a    IMPRESSION/RECOMMENDATIONS:    Assessment:   1. Status post spinal cord stimulator and battery implant with battery failure  2. Postlaminectomy syndrome  3. Prior L3-S1 decompression and fusion.    Plan:  1) Spinal cord stimulator and battery exchange    ZAYRA Hernandez

## 2024-06-04 ENCOUNTER — HOSPITAL ENCOUNTER (OUTPATIENT)
Age: 59
Setting detail: OUTPATIENT SURGERY
Discharge: HOME | End: 2024-06-04
Attending: ORTHOPAEDIC SURGERY | Admitting: ORTHOPAEDIC SURGERY
Payer: COMMERCIAL

## 2024-06-04 ENCOUNTER — ANESTHESIA (OUTPATIENT)
Dept: OPERATING ROOM | Age: 59
End: 2024-06-04
Payer: COMMERCIAL

## 2024-06-04 ENCOUNTER — APPOINTMENT (OUTPATIENT)
Dept: GENERAL RADIOLOGY | Age: 59
End: 2024-06-04
Attending: ORTHOPAEDIC SURGERY
Payer: COMMERCIAL

## 2024-06-04 VITALS
SYSTOLIC BLOOD PRESSURE: 125 MMHG | TEMPERATURE: 96.6 F | BODY MASS INDEX: 33.89 KG/M2 | OXYGEN SATURATION: 98 % | HEART RATE: 75 BPM | HEIGHT: 65 IN | RESPIRATION RATE: 16 BRPM | DIASTOLIC BLOOD PRESSURE: 75 MMHG | WEIGHT: 203.4 LBS

## 2024-06-04 DIAGNOSIS — Z79.4 TYPE 2 DIABETES MELLITUS WITH HYPERGLYCEMIA, WITH LONG-TERM CURRENT USE OF INSULIN (HCC): ICD-10-CM

## 2024-06-04 DIAGNOSIS — E11.65 TYPE 2 DIABETES MELLITUS WITH HYPERGLYCEMIA, WITH LONG-TERM CURRENT USE OF INSULIN (HCC): ICD-10-CM

## 2024-06-04 LAB — GLUCOSE BLD STRIP.AUTO-MCNC: 127 MG/DL (ref 70–108)

## 2024-06-04 PROCEDURE — 6360000002 HC RX W HCPCS: Performed by: NURSE ANESTHETIST, CERTIFIED REGISTERED

## 2024-06-04 PROCEDURE — 2500000003 HC RX 250 WO HCPCS: Performed by: NURSE ANESTHETIST, CERTIFIED REGISTERED

## 2024-06-04 PROCEDURE — 2780000010 HC IMPLANT OTHER: Performed by: ORTHOPAEDIC SURGERY

## 2024-06-04 PROCEDURE — 7100000001 HC PACU RECOVERY - ADDTL 15 MIN: Performed by: ORTHOPAEDIC SURGERY

## 2024-06-04 PROCEDURE — 6370000000 HC RX 637 (ALT 250 FOR IP): Performed by: PHYSICIAN ASSISTANT

## 2024-06-04 PROCEDURE — 3700000000 HC ANESTHESIA ATTENDED CARE: Performed by: ORTHOPAEDIC SURGERY

## 2024-06-04 PROCEDURE — C1820 GENERATOR NEURO RECHG BAT SY: HCPCS | Performed by: ORTHOPAEDIC SURGERY

## 2024-06-04 PROCEDURE — 7100000011 HC PHASE II RECOVERY - ADDTL 15 MIN: Performed by: ORTHOPAEDIC SURGERY

## 2024-06-04 PROCEDURE — 2720000010 HC SURG SUPPLY STERILE: Performed by: ORTHOPAEDIC SURGERY

## 2024-06-04 PROCEDURE — 2580000003 HC RX 258: Performed by: PHYSICIAN ASSISTANT

## 2024-06-04 PROCEDURE — C1778 LEAD, NEUROSTIMULATOR: HCPCS | Performed by: ORTHOPAEDIC SURGERY

## 2024-06-04 PROCEDURE — 7100000000 HC PACU RECOVERY - FIRST 15 MIN: Performed by: ORTHOPAEDIC SURGERY

## 2024-06-04 PROCEDURE — 82948 REAGENT STRIP/BLOOD GLUCOSE: CPT

## 2024-06-04 PROCEDURE — 6360000002 HC RX W HCPCS: Performed by: PHYSICIAN ASSISTANT

## 2024-06-04 PROCEDURE — C1889 IMPLANT/INSERT DEVICE, NOC: HCPCS | Performed by: ORTHOPAEDIC SURGERY

## 2024-06-04 PROCEDURE — 3600000013 HC SURGERY LEVEL 3 ADDTL 15MIN: Performed by: ORTHOPAEDIC SURGERY

## 2024-06-04 PROCEDURE — 3600000003 HC SURGERY LEVEL 3 BASE: Performed by: ORTHOPAEDIC SURGERY

## 2024-06-04 PROCEDURE — C1713 ANCHOR/SCREW BN/BN,TIS/BN: HCPCS | Performed by: ORTHOPAEDIC SURGERY

## 2024-06-04 PROCEDURE — 6360000002 HC RX W HCPCS: Performed by: ORTHOPAEDIC SURGERY

## 2024-06-04 PROCEDURE — 7100000010 HC PHASE II RECOVERY - FIRST 15 MIN: Performed by: ORTHOPAEDIC SURGERY

## 2024-06-04 PROCEDURE — 2709999900 HC NON-CHARGEABLE SUPPLY: Performed by: ORTHOPAEDIC SURGERY

## 2024-06-04 PROCEDURE — 3700000001 HC ADD 15 MINUTES (ANESTHESIA): Performed by: ORTHOPAEDIC SURGERY

## 2024-06-04 PROCEDURE — C1787 PATIENT PROGR, NEUROSTIM: HCPCS | Performed by: ORTHOPAEDIC SURGERY

## 2024-06-04 DEVICE — 50CM 2X8 SURGICAL LEAD KIT
Type: IMPLANTABLE DEVICE | Status: FUNCTIONAL
Brand: ARTISAN™

## 2024-06-04 DEVICE — ANCHOR
Type: IMPLANTABLE DEVICE | Status: FUNCTIONAL
Brand: CLIK™ X

## 2024-06-04 DEVICE — IMPLANTABLE PULSE GENERATOR KIT
Type: IMPLANTABLE DEVICE | Status: FUNCTIONAL
Brand: WAVEWRITER ALPHA™ 16

## 2024-06-04 RX ORDER — FENTANYL CITRATE 50 UG/ML
50 INJECTION, SOLUTION INTRAMUSCULAR; INTRAVENOUS EVERY 5 MIN PRN
Status: DISCONTINUED | OUTPATIENT
Start: 2024-06-04 | End: 2024-06-04 | Stop reason: HOSPADM

## 2024-06-04 RX ORDER — ONDANSETRON 2 MG/ML
4 INJECTION INTRAMUSCULAR; INTRAVENOUS
Status: DISCONTINUED | OUTPATIENT
Start: 2024-06-04 | End: 2024-06-04 | Stop reason: HOSPADM

## 2024-06-04 RX ORDER — ROCURONIUM BROMIDE 10 MG/ML
INJECTION, SOLUTION INTRAVENOUS PRN
Status: DISCONTINUED | OUTPATIENT
Start: 2024-06-04 | End: 2024-06-04 | Stop reason: SDUPTHER

## 2024-06-04 RX ORDER — FENTANYL CITRATE 50 UG/ML
INJECTION, SOLUTION INTRAMUSCULAR; INTRAVENOUS PRN
Status: DISCONTINUED | OUTPATIENT
Start: 2024-06-04 | End: 2024-06-04 | Stop reason: SDUPTHER

## 2024-06-04 RX ORDER — SODIUM CHLORIDE 0.9 % (FLUSH) 0.9 %
5-40 SYRINGE (ML) INJECTION PRN
Status: DISCONTINUED | OUTPATIENT
Start: 2024-06-04 | End: 2024-06-04 | Stop reason: HOSPADM

## 2024-06-04 RX ORDER — SODIUM CHLORIDE 0.9 % (FLUSH) 0.9 %
5-40 SYRINGE (ML) INJECTION EVERY 12 HOURS SCHEDULED
Status: DISCONTINUED | OUTPATIENT
Start: 2024-06-04 | End: 2024-06-04 | Stop reason: HOSPADM

## 2024-06-04 RX ORDER — DEXAMETHASONE SODIUM PHOSPHATE 10 MG/ML
INJECTION, EMULSION INTRAMUSCULAR; INTRAVENOUS PRN
Status: DISCONTINUED | OUTPATIENT
Start: 2024-06-04 | End: 2024-06-04 | Stop reason: SDUPTHER

## 2024-06-04 RX ORDER — VANCOMYCIN HYDROCHLORIDE 1 G/20ML
INJECTION, POWDER, LYOPHILIZED, FOR SOLUTION INTRAVENOUS PRN
Status: DISCONTINUED | OUTPATIENT
Start: 2024-06-04 | End: 2024-06-04 | Stop reason: ALTCHOICE

## 2024-06-04 RX ORDER — LIDOCAINE HCL/PF 100 MG/5ML
SYRINGE (ML) INJECTION PRN
Status: DISCONTINUED | OUTPATIENT
Start: 2024-06-04 | End: 2024-06-04 | Stop reason: SDUPTHER

## 2024-06-04 RX ORDER — CYCLOBENZAPRINE HCL 10 MG
10 TABLET ORAL 3 TIMES DAILY PRN
Status: DISCONTINUED | OUTPATIENT
Start: 2024-06-04 | End: 2024-06-04 | Stop reason: HOSPADM

## 2024-06-04 RX ORDER — PROPOFOL 10 MG/ML
INJECTION, EMULSION INTRAVENOUS PRN
Status: DISCONTINUED | OUTPATIENT
Start: 2024-06-04 | End: 2024-06-04 | Stop reason: SDUPTHER

## 2024-06-04 RX ORDER — SODIUM CHLORIDE 9 MG/ML
INJECTION, SOLUTION INTRAVENOUS PRN
Status: DISCONTINUED | OUTPATIENT
Start: 2024-06-04 | End: 2024-06-04 | Stop reason: HOSPADM

## 2024-06-04 RX ORDER — ONDANSETRON 2 MG/ML
INJECTION INTRAMUSCULAR; INTRAVENOUS PRN
Status: DISCONTINUED | OUTPATIENT
Start: 2024-06-04 | End: 2024-06-04 | Stop reason: SDUPTHER

## 2024-06-04 RX ORDER — OXYCODONE HYDROCHLORIDE AND ACETAMINOPHEN 5; 325 MG/1; MG/1
2 TABLET ORAL EVERY 4 HOURS PRN
Status: DISCONTINUED | OUTPATIENT
Start: 2024-06-04 | End: 2024-06-04 | Stop reason: HOSPADM

## 2024-06-04 RX ORDER — GLYCOPYRROLATE 0.2 MG/ML
INJECTION INTRAMUSCULAR; INTRAVENOUS PRN
Status: DISCONTINUED | OUTPATIENT
Start: 2024-06-04 | End: 2024-06-04 | Stop reason: SDUPTHER

## 2024-06-04 RX ORDER — MEPERIDINE HYDROCHLORIDE 25 MG/ML
12.5 INJECTION INTRAMUSCULAR; INTRAVENOUS; SUBCUTANEOUS EVERY 5 MIN PRN
Status: DISCONTINUED | OUTPATIENT
Start: 2024-06-04 | End: 2024-06-04 | Stop reason: HOSPADM

## 2024-06-04 RX ORDER — OXYCODONE HYDROCHLORIDE AND ACETAMINOPHEN 5; 325 MG/1; MG/1
1 TABLET ORAL EVERY 4 HOURS PRN
Status: DISCONTINUED | OUTPATIENT
Start: 2024-06-04 | End: 2024-06-04 | Stop reason: HOSPADM

## 2024-06-04 RX ORDER — TRAMADOL HYDROCHLORIDE 50 MG/1
50 TABLET ORAL ONCE
Status: DISCONTINUED | OUTPATIENT
Start: 2024-06-04 | End: 2024-06-04 | Stop reason: HOSPADM

## 2024-06-04 RX ORDER — NALOXONE HYDROCHLORIDE 0.4 MG/ML
INJECTION, SOLUTION INTRAMUSCULAR; INTRAVENOUS; SUBCUTANEOUS PRN
Status: DISCONTINUED | OUTPATIENT
Start: 2024-06-04 | End: 2024-06-04 | Stop reason: HOSPADM

## 2024-06-04 RX ADMIN — FENTANYL CITRATE 50 MCG: 50 INJECTION INTRAMUSCULAR; INTRAVENOUS at 08:56

## 2024-06-04 RX ADMIN — FENTANYL CITRATE 100 MCG: 50 INJECTION INTRAMUSCULAR; INTRAVENOUS at 07:20

## 2024-06-04 RX ADMIN — DEXAMETHASONE SODIUM PHOSPHATE 10 MG: 10 INJECTION, EMULSION INTRAMUSCULAR; INTRAVENOUS at 07:39

## 2024-06-04 RX ADMIN — WATER 2000 MG: 1 INJECTION INTRAMUSCULAR; INTRAVENOUS; SUBCUTANEOUS at 07:29

## 2024-06-04 RX ADMIN — ONDANSETRON 4 MG: 2 INJECTION INTRAMUSCULAR; INTRAVENOUS at 08:34

## 2024-06-04 RX ADMIN — SODIUM CHLORIDE: 9 INJECTION, SOLUTION INTRAVENOUS at 06:31

## 2024-06-04 RX ADMIN — OXYCODONE HYDROCHLORIDE AND ACETAMINOPHEN 1 TABLET: 5; 325 TABLET ORAL at 09:42

## 2024-06-04 RX ADMIN — FENTANYL CITRATE 50 MCG: 50 INJECTION INTRAMUSCULAR; INTRAVENOUS at 08:07

## 2024-06-04 RX ADMIN — SUGAMMADEX 200 MG: 100 INJECTION, SOLUTION INTRAVENOUS at 08:34

## 2024-06-04 RX ADMIN — PROPOFOL 170 MG: 10 INJECTION, EMULSION INTRAVENOUS at 07:23

## 2024-06-04 RX ADMIN — Medication 100 MG: at 07:22

## 2024-06-04 RX ADMIN — ROCURONIUM BROMIDE 50 MG: 10 INJECTION INTRAVENOUS at 07:23

## 2024-06-04 RX ADMIN — GLYCOPYRROLATE 0.2 MG: 0.2 INJECTION INTRAMUSCULAR; INTRAVENOUS at 07:53

## 2024-06-04 RX ADMIN — FENTANYL CITRATE 50 MCG: 50 INJECTION INTRAMUSCULAR; INTRAVENOUS at 08:50

## 2024-06-04 ASSESSMENT — PAIN SCALES - GENERAL
PAINLEVEL_OUTOF10: 4
PAINLEVEL_OUTOF10: 4
PAINLEVEL_OUTOF10: 6

## 2024-06-04 ASSESSMENT — PAIN DESCRIPTION - LOCATION: LOCATION: BACK

## 2024-06-04 ASSESSMENT — PAIN DESCRIPTION - PAIN TYPE: TYPE: SURGICAL PAIN

## 2024-06-04 ASSESSMENT — PAIN DESCRIPTION - DESCRIPTORS
DESCRIPTORS: BURNING;SORE
DESCRIPTORS: BURNING;SORE
DESCRIPTORS: SORE

## 2024-06-04 ASSESSMENT — PAIN - FUNCTIONAL ASSESSMENT
PAIN_FUNCTIONAL_ASSESSMENT: 0-10
PAIN_FUNCTIONAL_ASSESSMENT: ACTIVITIES ARE NOT PREVENTED
PAIN_FUNCTIONAL_ASSESSMENT: 0-10
PAIN_FUNCTIONAL_ASSESSMENT: 0-10

## 2024-06-04 ASSESSMENT — PAIN DESCRIPTION - ORIENTATION: ORIENTATION: LEFT;RIGHT;MID

## 2024-06-04 ASSESSMENT — PAIN DESCRIPTION - FREQUENCY: FREQUENCY: INTERMITTENT

## 2024-06-04 ASSESSMENT — PAIN DESCRIPTION - ONSET: ONSET: PROGRESSIVE

## 2024-06-04 ASSESSMENT — ENCOUNTER SYMPTOMS: SHORTNESS OF BREATH: 1

## 2024-06-04 NOTE — BRIEF OP NOTE
Brief Postoperative Note      Patient: Toña Valentine  YOB: 1965  MRN: [id number]    Date of Procedure: 6/4/2024    Pre-Op Diagnosis Codes:     * Postlaminectomy syndrome [M96.1]    Post-Op Diagnosis: Same       Procedure(s):  SPINAL CORD STIMULATOR EXCHANGE AND BATTERY WITH MRI COMPATIBLE    Surgeon(s):  Luis Vasquez MD    Assistant:  Physician Assistant: Norm Garcia PA-C    Anesthesia: General    Estimated Blood Loss (mL): 25 mL    Complications: None    Specimens:   * No specimens in log *    Implants:  Implant Name Type Inv. Item Serial No.  Lot No. LRB No. Used Action   WAVEWRITER ALPHA 16 IMPLANTABLE PULSE GENERATOR KIT - [id number]  WAVEWRITER ALPHA 16 IMPLANTABLE PULSE GENERATOR KIT  [employer] [id number] N/A 1 Implanted   IMPL SPINE STIM LEAD ARTISAN 50CM - [id number]  IMPL SPINE STIM LEAD ARTISAN 50CM  [employer] [id number] N/A 1 Implanted   ANCHOR NEUROSTIMULATOR L4CM FOR SPNL CRD STIM SYS CLIK X - [id number]  ANCHOR NEUROSTIMULATOR L4CM FOR SPNL CRD STIM SYS CLIK X  [employer] [id number] N/A 1 Implanted         Drains: None    Findings:  Infection Present At Time Of Surgery (PATOS) (choose all levels that have infection present):  No infection present  Other Findings: spinal cord stimulator malfunctioning    Electronically signed by ZAYRA Hernandez on 6/4/2024 at 8:24 AM

## 2024-06-04 NOTE — DISCHARGE INSTRUCTIONS
Wound Care:  -Okay to start dressing changes 48-72 hours after surgery. Change dressing once daily until incision is clean and dry, then okay to leave open to air.   -Do not submerge incision in water. Avoid hot-tubs and pools    Restrictions:  -Limit bending and twisting  -No lifting over 10-15 lbs  -Walk as much as tolerated, take short frequent walks throughout the day and try to take one long walk a day (start at 5 minutes and increase as tolerated)    Medications:  -A pain medication (Tramadol) and muscle relaxer (Flexeril) will be sent to your pharmacy. Take medications as directed  -Pain medications can cause postoperative constipation. Take an over the counter stool softener while taking the pain medication  -Okay to resume vitamins and supplements and NSAIDs (I.e. ibuprofen) one week after surgery     Postoperative appointment:  -Please contact the office to schedule an appointment for 6 weeks after surgery if you do not have one  [phone] ext [phone]     If any concerning symptoms, such as calf pain/swelling, new numbness/tingling or pain please the contact the office. If concerning symptoms including chest pain or difficulty breathing, go to the Emergency Department.

## 2024-06-04 NOTE — OP NOTE
Operative Note      Patient: Toña Valentine  YOB: 1965  MRN: [id number]                       ACCOUNT NO: [id number]                               ROOM: 011  ADMISSION DATE: 6/4/2024    PROVIDER:  Luis Vasquez M.D.     DATE OF PROCEDURE: 6/4/2024     PREOPERATIVE DIAGNOSES:  1.  Prior L3-S1 decompression and fusion  2.  Post-laminectomy syndrome.  3.  Spinal cord stimulator implant with hardware failure  4.  Obesity, BMI of 33.85.     POSTOPERATIVE DIAGNOSES:  1.  Prior L3-S1 decompression and fusion  2.  Post-laminectomy syndrome.  3.  Spinal cord stimulator implant with hardware failure  4.  Obesity, BMI of 33.85.     OPERATIONS PERFORMED:  1.  T7-8 spinal cord stimulator and battery exchange, right buttock     SURGEON:  Luis Brown M.D.     ASSISTANT:  NIRU Ortiz, PAC, assisted throughout the procedure with positioning, draping, retraction, wound closure, dressing, and splint application.     ANESTHESIA:  General.     INDICATIONS:  This is a 58-year-old female who is status post L3-S1 decompression and fusion done in the past with persistent back and radicular symptoms. She had a spinal cord stimulator placed which is now requiring a battery replacement. Patient requesting to have a MRI compatible device. Patient, therefore, understood the indication for the surgery as well as its risks, benefits, and alternatives.  These risks include, but are not limited to paralysis, infection, dural tear, nerve root injury, nonunion, persistent pain, DVT/PE, stroke, MI, death etc.  All questions were answered and informed consent was obtained.     DESCRIPTION OF THE PROCEDURE:  The patient was taken to the operating room by Anesthesiology Service and had satisfactory general anesthesia.  A first-generation cephalosporin was given within one hour of surgical incision, 2 gm of cefazolin were given IV.  Venous thromboembolic prophylaxis was performed with sequential

## 2024-06-04 NOTE — ANESTHESIA POSTPROCEDURE EVALUATION
Department of Anesthesiology  Postprocedure Note    Patient: Toña Valentine  MRN: [id number]  YOB: 1965  Date of evaluation: 6/4/2024    Procedure Summary       Date: 06/04/24 Room / Location: [facility] OR  / [facility] OR    Anesthesia Start: 0714 Anesthesia Stop: 0901    Procedure: SPINAL CORD STIMULATOR EXCHANGE AND BATTERY WITH MRI COMPATIBLE Diagnosis:       Postlaminectomy syndrome      (Postlaminectomy syndrome [M96.1])    Surgeons: Luis Vasquez MD Responsible Provider: Mathew Bethea DO    Anesthesia Type: general ASA Status: 3            Anesthesia Type: No value filed.    Aspen Phase I: Aspen Score: 10    Aspen Phase II:      Anesthesia Post Evaluation    Patient location during evaluation: PACU  Patient participation: complete - patient participated  Level of consciousness: awake and alert  Pain score: 3  Airway patency: patent  Nausea & Vomiting: no nausea and no vomiting  Cardiovascular status: hemodynamically stable and blood pressure returned to baseline  Respiratory status: spontaneous ventilation, room air and acceptable  Hydration status: stable  Pain management: adequate and satisfactory to patient    No notable events documented.

## 2024-06-04 NOTE — PROGRESS NOTES
0856 Pt arrives to PACU, alert to voice. Resp easy and unlabored on simple mask at 8L.    0900 Patient having some discomfort, rates it a 6/10, medicated per CRNA. Simple mask removed, pt on room air.     0910 Rep at bedside for spinal cord stimulator. Pt alert and talking, states pain is improved. Resp easy and unlabored on room air.VSS    0920 Pt continues to rest in bed, alert. Resp easy and unlabored on room air. VSS.     0926 Pt meets criteria for discharge from PACU at this time, transported to [facility] in stable condition.     0930 Report given to Luisana HOLLINS

## 2024-06-04 NOTE — ANESTHESIA PRE PROCEDURE
Department of Anesthesiology  Preprocedure Note       Name:  Toña Valentine   Age:  58 y.o.  :  1965                                          MRN:  [id number]         Date:  2024      Surgeon: Surgeon(s):  Luis Vasquez MD    Procedure: Procedure(s):  SPINAL CORD STIMULATOR EXCHANGE AND BATTERY WITH MRI COMPATIBLE    Medications prior to admission:   Prior to Admission medications    Medication Sig Start Date End Date Taking? Authorizing Provider   gabapentin (NEURONTIN) 300 MG capsule Take 1 capsule by mouth in the morning and 1 capsule in the evening. 1 in the morning and 2 in the evening . 5/10/24   ProviderBrittany MD   Semaglutide,0.25 or 0.5MG/DOS, (OZEMPIC, 0.25 OR 0.5 MG/DOSE,) 2 MG/3ML SOPN 0.25 mg weekly  Patient not taking: Reported on 2024   Eliseo Tolentino MD   DULoxetine (CYMBALTA) 60 MG extended release capsule Take 1 capsule by mouth daily    Provider, MD Brittany   Tirzepatide (MOUNJARO) 7.5 MG/0.5ML SOPN SC injection Inject 0.5 mLs into the skin once a week  Patient taking differently: Inject 0.5 mLs into the skin once a week On 24  Gurdeep, Kerline LAWSON DO   Continuous Blood Gluc Sensor (FREESTYLE SYLVESTER 3 SENSOR) MISC Check blood sugars 4x/day 24   Eliseo Tolentino MD   TRESIBA FLEXTOUCH 100 UNIT/ML SOPN INJECT 45 UNITS UNDER THE SKIN ONCE DAILY  Patient taking differently: Inject 30 Units into the skin every morning (before breakfast) INJECT 30 UNITS UNDER THE SKIN ONCE DAILY 24   Eliseo Tolentino MD   hydrALAZINE (APRESOLINE) 25 MG tablet Take 1 tablet by mouth 3 times daily  Patient taking differently: Take 1 tablet by mouth in the morning and at bedtime 24   Rony Hicks MD   losartan (COZAAR) 100 MG tablet Take 1 tablet by mouth daily 24   Rony Hicks MD   Insulin Pen Needle (B-D UF III MINI PEN NEEDLES) 31G X 5 MM MISC 1 each by Does not apply route daily 24   Rony Hicks MD   metoprolol succinate (TOPROL XL)

## 2024-06-04 NOTE — PROGRESS NOTES

## 2024-06-04 NOTE — PROGRESS NOTES
Pt admitted to [facility] room 6 and oriented to unit. SCD sleeves applied. Nares swabbed. Pt verbalized permission for first name, last initial and physicians name on white board. SDS board and discharge criteria explained, pt and family verbalized understanding. Pt denies thoughts of harming self or others. Call light in reach. Family at the bedside.

## 2024-08-09 ENCOUNTER — OFFICE VISIT (OUTPATIENT)
Age: 59
End: 2024-08-09
Payer: COMMERCIAL

## 2024-08-09 VITALS
BODY MASS INDEX: 34.32 KG/M2 | SYSTOLIC BLOOD PRESSURE: 132 MMHG | HEIGHT: 65 IN | WEIGHT: 206 LBS | HEART RATE: 85 BPM | DIASTOLIC BLOOD PRESSURE: 82 MMHG

## 2024-08-09 DIAGNOSIS — E11.65 TYPE 2 DIABETES MELLITUS WITH HYPERGLYCEMIA, WITH LONG-TERM CURRENT USE OF INSULIN (HCC): Primary | ICD-10-CM

## 2024-08-09 DIAGNOSIS — Z79.4 TYPE 2 DIABETES MELLITUS WITH HYPERGLYCEMIA, WITH LONG-TERM CURRENT USE OF INSULIN (HCC): Primary | ICD-10-CM

## 2024-08-09 DIAGNOSIS — E78.2 MIXED HYPERLIPIDEMIA: ICD-10-CM

## 2024-08-09 PROCEDURE — 3044F HG A1C LEVEL LT 7.0%: CPT | Performed by: INTERNAL MEDICINE

## 2024-08-09 PROCEDURE — 3075F SYST BP GE 130 - 139MM HG: CPT | Performed by: INTERNAL MEDICINE

## 2024-08-09 PROCEDURE — 99214 OFFICE O/P EST MOD 30 MIN: CPT | Performed by: INTERNAL MEDICINE

## 2024-08-09 PROCEDURE — 3079F DIAST BP 80-89 MM HG: CPT | Performed by: INTERNAL MEDICINE

## 2024-08-09 RX ORDER — BLOOD-GLUCOSE SENSOR
EACH MISCELLANEOUS
Qty: 6 EACH | Refills: 1 | Status: SHIPPED | OUTPATIENT
Start: 2024-08-09

## 2024-08-09 RX ORDER — INSULIN DEGLUDEC 100 U/ML
25 INJECTION, SOLUTION SUBCUTANEOUS
Qty: 5 ADJUSTABLE DOSE PRE-FILLED PEN SYRINGE | Refills: 5 | Status: SHIPPED | OUTPATIENT
Start: 2024-08-09

## 2024-08-09 RX ORDER — TIRZEPATIDE 10 MG/.5ML
10 INJECTION, SOLUTION SUBCUTANEOUS WEEKLY
Qty: 6 ML | Refills: 1 | Status: SHIPPED | OUTPATIENT
Start: 2024-08-09

## 2024-08-09 RX ORDER — ATORVASTATIN CALCIUM 40 MG/1
40 TABLET, FILM COATED ORAL DAILY
Qty: 30 TABLET | Refills: 3 | Status: SHIPPED | OUTPATIENT
Start: 2024-08-09

## 2024-08-09 NOTE — PROGRESS NOTES
CGMSINTERPRETATION    CGMS interpretation:  The patient is checking blood sugars 4 times a day using freestyle natalia continuous glucose monitoring system.  His download includes data from 7/27/2024 through 8/9/2024 and is sufficient for interpretation.  The CGM was active 93% of the time.  Average blood glucose is 144 mg/dL with a coefficient of variation of 20.8% and glucose management indicator of 6.8%  The patient was within the target range 89 percent of the time.  Hypoglycemia:  Blood glucose range between 54 mg/dL and 70mg/dL: 0%  Blood glucose is below 54 mg/dL: 0%  Hyperglycemia:  Blood glucoses between 181 and 250 mg/dL: 11%  Blood glucoses above 250 mg/dL: 0%  In summary, this patient is having good glycemic control.  Recommendations: Please refer to the assessment and plan section  
of 3 - 3-dose series) Never done    Shingles vaccine (1 of 2) Never done    Diabetic retinal exam  07/19/2023    COVID-19 Vaccine (2 - 2023-24 season) 09/01/2023    Colorectal Cancer Screen  01/07/2024    Flu vaccine (1) Never done    Lipids  11/07/2024    Diabetic foot exam  01/25/2025    Diabetic Alb to Cr ratio (uACR) test  02/05/2025    Depression Monitoring  02/09/2025    A1C test (Diabetic or Prediabetic)  05/20/2025    GFR test (Diabetes, CKD 3-4, OR last GFR 15-59)  05/21/2025    Breast cancer screen  01/15/2026    DTaP/Tdap/Td vaccine (3 - Td or Tdap) 09/26/2033    Pneumococcal 0-64 years Vaccine  Completed    Hepatitis C screen  Completed    HIV screen  Completed    Hepatitis A vaccine  Aged Out    Hib vaccine  Aged Out    Polio vaccine  Aged Out    Meningococcal (ACWY) vaccine  Aged Out       Labs  Lab Results   Component Value Date    LABA1C 5.8 05/20/2024     Lab Results   Component Value Date     02/05/2024     Lab Results   Component Value Date    TSH 1.170 09/27/2019     Lab Results   Component Value Date    CHOL 127 11/07/2023    CHOL 183 09/01/2023    CHOL 137 12/14/2022     Lab Results   Component Value Date    TRIG 95 11/07/2023    TRIG 147 09/01/2023    TRIG 168 12/14/2022     Lab Results   Component Value Date    HDL 43 11/07/2023    HDL 46 09/01/2023    HDL 38 12/14/2022     No components found for: \"LDLCALC\", \"LDLCHOLESTEROL\"    Lab Results   Component Value Date    VLDL 26 09/01/2023     No results found for: \"CHOLHDLRATIO\"    Review of Systems  Constitutional: negative for chills and fevers  Eyes: negative for irritation, redness and visual disturbance  Respiratory: negative for cough, shortness of breath and wheezing  Cardiovascular: negative for chest pain, irregular heart beat and palpitations   The remainder of systems were reviewed and negative.       Objective:    /82 (Site: Right Upper Arm, Position: Sitting, Cuff Size: Medium Adult)   Pulse 85   Ht 1.651 m (5' 5\")

## 2024-08-16 ENCOUNTER — TELEPHONE (OUTPATIENT)
Age: 59
End: 2024-08-16

## 2024-08-16 ENCOUNTER — CLINICAL DOCUMENTATION (OUTPATIENT)
Age: 59
End: 2024-08-16

## 2024-08-16 DIAGNOSIS — E11.65 TYPE 2 DIABETES MELLITUS WITH HYPERGLYCEMIA, WITH LONG-TERM CURRENT USE OF INSULIN (HCC): Primary | ICD-10-CM

## 2024-08-16 DIAGNOSIS — Z79.4 TYPE 2 DIABETES MELLITUS WITH HYPERGLYCEMIA, WITH LONG-TERM CURRENT USE OF INSULIN (HCC): Primary | ICD-10-CM

## 2024-08-16 RX ORDER — INSULIN DEGLUDEC 100 U/ML
25 INJECTION, SOLUTION SUBCUTANEOUS DAILY
Qty: 15 ADJUSTABLE DOSE PRE-FILLED PEN SYRINGE | Refills: 3 | Status: SHIPPED | OUTPATIENT
Start: 2024-08-16

## 2024-08-16 NOTE — TELEPHONE ENCOUNTER
[facility] calling for clarification on tresiba Flextouch. Please fix sig and resend to [facility]. (Rx states both 25 units and 30 units).

## 2024-08-20 DIAGNOSIS — Z79.4 TYPE 2 DIABETES MELLITUS WITH HYPERGLYCEMIA, WITH LONG-TERM CURRENT USE OF INSULIN (HCC): ICD-10-CM

## 2024-08-20 DIAGNOSIS — E11.65 TYPE 2 DIABETES MELLITUS WITH HYPERGLYCEMIA, WITH LONG-TERM CURRENT USE OF INSULIN (HCC): ICD-10-CM

## 2024-08-20 RX ORDER — TIRZEPATIDE 10 MG/.5ML
10 INJECTION, SOLUTION SUBCUTANEOUS WEEKLY
Qty: 6 ML | Refills: 1 | OUTPATIENT
Start: 2024-08-20

## 2024-08-21 ENCOUNTER — CLINICAL DOCUMENTATION (OUTPATIENT)
Age: 59
End: 2024-08-21

## 2024-08-21 DIAGNOSIS — E11.65 TYPE 2 DIABETES MELLITUS WITH HYPERGLYCEMIA, WITH LONG-TERM CURRENT USE OF INSULIN (HCC): ICD-10-CM

## 2024-08-21 DIAGNOSIS — Z79.4 TYPE 2 DIABETES MELLITUS WITH HYPERGLYCEMIA, WITH LONG-TERM CURRENT USE OF INSULIN (HCC): ICD-10-CM

## 2024-08-21 RX ORDER — TIRZEPATIDE 10 MG/.5ML
10 INJECTION, SOLUTION SUBCUTANEOUS WEEKLY
Qty: 6 ML | Refills: 2 | Status: SHIPPED | OUTPATIENT
Start: 2024-08-21

## 2024-08-27 ENCOUNTER — OFFICE VISIT (OUTPATIENT)
Dept: PULMONOLOGY | Age: 59
End: 2024-08-27
Payer: COMMERCIAL

## 2024-08-27 VITALS
TEMPERATURE: 97.8 F | WEIGHT: 207 LBS | BODY MASS INDEX: 34.49 KG/M2 | HEART RATE: 74 BPM | DIASTOLIC BLOOD PRESSURE: 82 MMHG | OXYGEN SATURATION: 96 % | SYSTOLIC BLOOD PRESSURE: 134 MMHG | HEIGHT: 65 IN

## 2024-08-27 DIAGNOSIS — G47.33 OSA ON CPAP: Primary | ICD-10-CM

## 2024-08-27 DIAGNOSIS — G47.09 OTHER INSOMNIA: ICD-10-CM

## 2024-08-27 DIAGNOSIS — E66.9 OBESITY (BMI 30-39.9): ICD-10-CM

## 2024-08-27 PROCEDURE — 99214 OFFICE O/P EST MOD 30 MIN: CPT | Performed by: PHYSICIAN ASSISTANT

## 2024-08-27 PROCEDURE — 3075F SYST BP GE 130 - 139MM HG: CPT | Performed by: PHYSICIAN ASSISTANT

## 2024-08-27 PROCEDURE — 3079F DIAST BP 80-89 MM HG: CPT | Performed by: PHYSICIAN ASSISTANT

## 2024-08-27 ASSESSMENT — ENCOUNTER SYMPTOMS
EYES NEGATIVE: 1
SHORTNESS OF BREATH: 0
WHEEZING: 0
DIARRHEA: 0
NAUSEA: 0
ALLERGIC/IMMUNOLOGIC NEGATIVE: 1
STRIDOR: 0
COUGH: 0
BACK PAIN: 1
CHEST TIGHTNESS: 0

## 2024-08-27 NOTE — PROGRESS NOTES
[facility]      Toña Valentine         [id number]  8/27/2024   Chief Complaint   Patient presents with    Follow-up     20 mo jenna         Pt of Dr. Korey WEEKS Download:   Original or initial AHI: 23.3     Date of initial study: 08/15/2006      Compliant  47%     Noncompliant 30 %     PAP Type cpap Level  12   Avg Hrs/Day 4 hr 47min  AHI: 4.4   Recorded compliance dates , 07-27-24 to 08-25-24    Machine/Mfg:   [x] ResMed    [] Respironics/Dreamstation   Interface:   [x] Nasal    [] Nasal pillows   [] FFM      Provider:      [x] [facility]     []Yang     [] [employer]    [] [employer]    [] Teresitaermans               [] [employer]      [] [employer]    [] Yaurel:      [] Other    Neck Size: 16.25  Mallampati Mallampati 3  ESS:  11  SAQLI: 38    Here is a scan of the most recent download:            Presentation:   Toña presents for sleep medicine follow up for obstructive sleep apnea  Since the last visit, Toña is struggling with PAP. Her mask is leaking more. She is struggling to fall asleep and staying asleep.  She has tried Melatonin, OTC sleep aid, trazodone. None of these worked.  She gets tired during the afternoon.     Equipment issues:  The pressure is  acceptable, the mask is unacceptable     Sleep issues:  Do you feel better? No  More rested?No   Better concentration? no    Progress History:   Since last visit any new medical issues? Yes spinal cord stimulator   New ER or hospital visits? No      Review of Systems -   Review of Systems   Constitutional:  Negative for activity change, appetite change, chills and fever.   HENT:  Negative for congestion and postnasal drip.    Eyes: Negative.    Respiratory:  Negative for cough, chest tightness, shortness of breath, wheezing and stridor.    Cardiovascular:  Negative for chest pain and leg swelling.   Gastrointestinal:  Negative for diarrhea and nausea.   Endocrine: Negative.    Genitourinary: Negative.   described pressure.   - She  advised to keep good compliance with current recommended pressure to get optimal results and clinical improvement  - Recommend 7-9 hours of sleep with PAP  - She was advised to call [employer] regarding supplies if needed.   -She call my office for earlier appointment if needed for worsening of sleep symptoms.   - She was instructed on weight loss  - Toña was educated about my impression and plan. Patient verbalizesunderstanding.  We will see Toña Valenitne back in: 2 months with download    Information added by my medical assistant/LPN was reviewed today       Jerrica Talley PA-C, [facility]  [facility] for pulmonary [facility] Medicine  8/27/2024     (Please note that portions of this note may have been with a voice recognition program.  Efforts were made to edit the dictation but occasionally words are mis-transcribed )

## 2024-08-28 DIAGNOSIS — E11.65 TYPE 2 DIABETES MELLITUS WITH HYPERGLYCEMIA, WITH LONG-TERM CURRENT USE OF INSULIN (HCC): ICD-10-CM

## 2024-08-28 DIAGNOSIS — Z79.4 TYPE 2 DIABETES MELLITUS WITH HYPERGLYCEMIA, WITH LONG-TERM CURRENT USE OF INSULIN (HCC): ICD-10-CM

## 2024-09-13 NOTE — PROGRESS NOTES
Detail Level: Detailed Pharmacy Note  Pre-operation antibiotic selection/dosing review    Evaluation for pre-operation antibiotic selection/dosing:    Lawanda Valentine  Patient allergies: IV Dye, shellfish, ampicillin, vancomycin  Wt Readings from Last 1 Encounters:   05/15/19 232 lb (105.2 kg)     There is no height or weight on file to calculate BMI. No results for input(s): CREATININE in the last 72 hours. Surgery type: LAP BEATRIZ     Plan: need to add flagyl or cleocin to cipro order    Individual contacted: Liz Antonio LPN    Response: she would let MD know. Quality 130: Documentation Of Current Medications In The Medical Record: Current Medications Documented

## 2024-10-17 ENCOUNTER — HOSPITAL ENCOUNTER (OUTPATIENT)
Age: 59
Discharge: HOME | End: 2024-10-17
Payer: COMMERCIAL

## 2024-10-17 LAB
BASOPHILS ABSOLUTE: 0 THOU/MM3 (ref 0–0.1)
BASOPHILS NFR BLD AUTO: 0.5 %
DEPRECATED RDW RBC AUTO: 39.6 FL (ref 35–45)
EOSINOPHIL NFR BLD AUTO: 2.9 %
EOSINOPHILS ABSOLUTE: 0.2 THOU/MM3 (ref 0–0.4)
ERYTHROCYTE [DISTWIDTH] IN BLOOD BY AUTOMATED COUNT: 12.1 % (ref 11.5–14.5)
HCT VFR BLD AUTO: 38.9 % (ref 37–47)
HGB BLD-MCNC: 13.1 GM/DL (ref 12–16)
IMM GRANULOCYTES # BLD AUTO: 0.01 THOU/MM3 (ref 0–0.07)
IMM GRANULOCYTES NFR BLD AUTO: 0.2 %
LYMPHOCYTES ABSOLUTE: 1.8 THOU/MM3 (ref 1–4.8)
LYMPHOCYTES NFR BLD AUTO: 32.8 %
MCH RBC QN AUTO: 30.2 PG (ref 26–33)
MCHC RBC AUTO-ENTMCNC: 33.7 GM/DL (ref 32.2–35.5)
MCV RBC AUTO: 89.6 FL (ref 81–99)
MONOCYTES ABSOLUTE: 0.5 THOU/MM3 (ref 0.4–1.3)
MONOCYTES NFR BLD AUTO: 9.1 %
NEUTROPHILS ABSOLUTE: 3.1 THOU/MM3 (ref 1.8–7.7)
NEUTROPHILS NFR BLD AUTO: 54.5 %
NRBC BLD AUTO-RTO: 0 /100 WBC
PLATELET # BLD AUTO: 231 THOU/MM3 (ref 130–400)
PMV BLD AUTO: 11.3 FL (ref 9.4–12.4)
RBC # BLD AUTO: 4.34 MILL/MM3 (ref 4.2–5.4)
WBC # BLD AUTO: 5.6 THOU/MM3 (ref 4.8–10.8)

## 2024-10-17 PROCEDURE — 36415 COLL VENOUS BLD VENIPUNCTURE: CPT

## 2024-10-17 PROCEDURE — 86611 BARTONELLA ANTIBODY: CPT

## 2024-10-17 PROCEDURE — 82784 ASSAY IGA/IGD/IGG/IGM EACH: CPT

## 2024-10-17 PROCEDURE — 86003 ALLG SPEC IGE CRUDE XTRC EA: CPT

## 2024-10-17 PROCEDURE — 86162 COMPLEMENT TOTAL (CH50): CPT

## 2024-10-17 PROCEDURE — 85025 COMPLETE CBC W/AUTO DIFF WBC: CPT

## 2024-10-17 PROCEDURE — 82787 IGG 1 2 3 OR 4 EACH: CPT

## 2024-10-17 PROCEDURE — 82785 ASSAY OF IGE: CPT

## 2024-10-17 PROCEDURE — 86317 IMMUNOASSAY INFECTIOUS AGENT: CPT

## 2024-10-18 LAB
ALLERGEN BERMUDA GRASS IGE: < 0.1 KU/L (ref 0–0.34)
ALLERGEN BIRCH IGE: < 0.1 KU/L (ref 0–0.34)
ALLERGEN DOG DANDER IGE: < 0.1 KU/L (ref 0–0.34)
ALLERGEN GERMAN COCKROACH IGE: < 0.1 KU/L (ref 0–0.34)
ALLERGEN HORMODENDRUM IGE: < 0.1 KUL/L (ref 0–0.34)
ALLERGEN MOUSE EPITHELIA IGE: < 0.1 KU/L (ref 0–0.34)
ALLERGEN OAK TREE IGE: < 0.1 KU/L (ref 0–0.34)
ALLERGEN PECAN TREE IGE: < 0.1 KU/L (ref 0–0.34)
ALLERGEN PIGWEED ROUGH IGE: < 0.1 KU/L (ref 0–0.34)
ALLERGEN SHEEP SORREL (W18) IGE: < 0.1 KU/L (ref 0–0.34)
ALLERGEN TREE SYCAMORE: < 0.1 KU/L (ref 0–0.34)
ALLERGEN WALNUT TREE IGE: < 0.1 KU/L (ref 0–0.34)
ALLERGEN WHITE MULBERRY TREE, IGE: < 0.1 KU/L (ref 0–0.34)
ALLERGEN, TREE, WHITE ASH IGE: < 0.1 KU/L (ref 0–0.34)
ALTERNARIA ALTERNATA: < 0.1 KU/L (ref 0–0.34)
ASPERGILLUS FUMIGATUS: < 0.1 KU/L (ref 0–0.34)
CAT DANDER ANTIBODY: < 0.1 KU/L (ref 0–0.34)
COTTONWOOD TREE: < 0.1 KU/L (ref 0–0.34)
D. FARINAE: < 0.1 KU/L (ref 0–0.34)
D. PTERONYSSINUS: < 0.1 KU/L (ref 0–0.34)
ELM TREE: < 0.1 KU/L (ref 0–0.34)
IGA SERPL-MCNC: 386 MG/DL (ref 70–400)
IGE SERPL-ACNC: 20 IU/ML (ref 0–100)
IGG SERPL-MCNC: 1031 MG/DL (ref 700–1600)
MAPLE/BOXELDER TREE: < 0.1 KU/L (ref 0–0.34)
MOUNTAIN CEDAR TREE: < 0.1 KU/L (ref 0–0.34)
MUCOR RACEMOSUS: < 0.1 KU/L (ref 0–0.34)
P. NOTATUM: < 0.1 KU/L (ref 0–0.34)
RUSSIAN THISTLE: < 0.1 KU/L (ref 0–0.34)
SHORT RAGWD(A ARTEMIS.) IGE: < 0.1 KU/L (ref 0–0.34)
TIMOTHY GRASS: < 0.1 KU/L (ref 0–0.34)

## 2024-10-21 LAB
B HENSELAE IGM TITR SER IF: NORMAL {TITER}
CH50 SERPL-ACNC: 81.2 U/ML (ref 38.7–89.9)
IGG SUBCLASSES: NORMAL

## 2024-10-22 LAB
S PN DA SERO 19F IGG SER-MCNC: 10.1 UG/ML
S PNEUM DA 1 IGG SER-MCNC: 0.39 UG/ML
S PNEUM DA 12F IGG SER-MCNC: 1.04 UG/ML
S PNEUM DA 14 IGG SER-MCNC: 3.67 UG/ML
S PNEUM DA 18C IGG SER-MCNC: 0.13 UG/ML
S PNEUM DA 23F IGG SER-MCNC: 0.15 UG/ML
S PNEUM DA 3 IGG SER-MCNC: 1.77 UG/ML
S PNEUM DA 4 IGG SER-MCNC: 1.11 UG/ML
S PNEUM DA 5 IGG SER-MCNC: 0.15 UG/ML
S PNEUM DA 6B IGG SER-MCNC: 0.29 UG/ML
S PNEUM DA 7F IGG SER-MCNC: 0.4 UG/ML
S PNEUM DA 8 IGG SER-MCNC: 1.15 UG/ML
S PNEUM DA 9N IGG SER-MCNC: 0.33 UG/ML
S PNEUM DA 9V IGG SER-MCNC: 0.52 UG/ML
S PNEUM SEROTYPE IGG SER-IMP: NORMAL

## 2024-10-25 ENCOUNTER — HOSPITAL ENCOUNTER (OUTPATIENT)
Dept: CT IMAGING | Age: 59
Discharge: HOME | End: 2024-10-25
Payer: COMMERCIAL

## 2024-10-25 DIAGNOSIS — J32.9 CHRONIC SINUSITIS, UNSPECIFIED LOCATION: ICD-10-CM

## 2024-10-25 PROCEDURE — 70486 CT MAXILLOFACIAL W/O DYE: CPT

## 2024-10-31 LAB
ALBUMIN: 4 G/DL
ALP BLD-CCNC: 69 U/L
ALT SERPL-CCNC: 19 U/L
ANION GAP SERPL CALCULATED.3IONS-SCNC: 5 MMOL/L
AST SERPL-CCNC: 17 U/L
BILIRUB SERPL-MCNC: 0.8 MG/DL (ref 0.1–1.4)
BUN BLDV-MCNC: 10 MG/DL
CALCIUM SERPL-MCNC: 9.2 MG/DL
CHLORIDE BLD-SCNC: 107 MMOL/L
CHOLESTEROL, TOTAL: ABNORMAL
CHOLESTEROL/HDL RATIO: ABNORMAL
CO2: 29 MMOL/L
CREAT SERPL-MCNC: 0.82 MG/DL
ESTIMATED AVERAGE GLUCOSE: 117
GFR, ESTIMATED: >60
GLUCOSE BLD-MCNC: 128 MG/DL
HBA1C MFR BLD: 5.7 %
HDLC SERPL-MCNC: ABNORMAL MG/DL
LDL CHOLESTEROL: ABNORMAL
NONHDLC SERPL-MCNC: ABNORMAL MG/DL
POTASSIUM SERPL-SCNC: 4.2 MMOL/L
SODIUM BLD-SCNC: 141 MMOL/L
TOTAL PROTEIN: 6.8 G/DL (ref 6.4–8.2)
TRIGL SERPL-MCNC: ABNORMAL MG/DL
VLDLC SERPL CALC-MCNC: ABNORMAL MG/DL

## 2024-11-11 ENCOUNTER — OFFICE VISIT (OUTPATIENT)
Age: 59
End: 2024-11-11
Payer: COMMERCIAL

## 2024-11-11 VITALS
HEART RATE: 74 BPM | SYSTOLIC BLOOD PRESSURE: 110 MMHG | HEIGHT: 65 IN | WEIGHT: 203.2 LBS | BODY MASS INDEX: 33.85 KG/M2 | DIASTOLIC BLOOD PRESSURE: 84 MMHG | RESPIRATION RATE: 16 BRPM

## 2024-11-11 DIAGNOSIS — E78.2 MIXED HYPERLIPIDEMIA: ICD-10-CM

## 2024-11-11 DIAGNOSIS — E11.65 TYPE 2 DIABETES MELLITUS WITH HYPERGLYCEMIA, WITH LONG-TERM CURRENT USE OF INSULIN (HCC): Primary | ICD-10-CM

## 2024-11-11 DIAGNOSIS — Z79.4 TYPE 2 DIABETES MELLITUS WITH HYPERGLYCEMIA, WITH LONG-TERM CURRENT USE OF INSULIN (HCC): Primary | ICD-10-CM

## 2024-11-11 PROCEDURE — 3079F DIAST BP 80-89 MM HG: CPT | Performed by: INTERNAL MEDICINE

## 2024-11-11 PROCEDURE — 3074F SYST BP LT 130 MM HG: CPT | Performed by: INTERNAL MEDICINE

## 2024-11-11 PROCEDURE — 99214 OFFICE O/P EST MOD 30 MIN: CPT | Performed by: INTERNAL MEDICINE

## 2024-11-11 PROCEDURE — 3044F HG A1C LEVEL LT 7.0%: CPT | Performed by: INTERNAL MEDICINE

## 2024-11-11 RX ORDER — ATORVASTATIN CALCIUM 40 MG/1
40 TABLET, FILM COATED ORAL DAILY
Qty: 30 TABLET | Refills: 3 | Status: SHIPPED | OUTPATIENT
Start: 2024-11-11

## 2024-11-11 RX ORDER — HYDRALAZINE HYDROCHLORIDE 25 MG/1
25 TABLET, FILM COATED ORAL 2 TIMES DAILY
COMMUNITY

## 2024-11-11 RX ORDER — TIRZEPATIDE 12.5 MG/.5ML
12.5 INJECTION, SOLUTION SUBCUTANEOUS WEEKLY
Qty: 2 ML | Refills: 3 | Status: SHIPPED | OUTPATIENT
Start: 2024-11-11

## 2024-11-11 NOTE — PROGRESS NOTES
Never done    Colorectal Cancer Screen  01/07/2024    Flu vaccine (1) Never done    COVID-19 Vaccine (2 - 2023-24 season) 09/01/2024    Diabetic foot exam  01/25/2025    Diabetic Alb to Cr ratio (uACR) test  02/05/2025    Depression Monitoring  02/09/2025    Diabetic retinal exam  10/21/2025    A1C test (Diabetic or Prediabetic)  10/31/2025    Lipids  10/31/2025    GFR test (Diabetes, CKD 3-4, OR last GFR 15-59)  10/31/2025    Breast cancer screen  01/15/2026    DTaP/Tdap/Td vaccine (3 - Td or Tdap) 09/26/2033    Pneumococcal 0-64 years Vaccine  Completed    Hepatitis C screen  Completed    HIV screen  Completed    Hepatitis A vaccine  Aged Out    Hib vaccine  Aged Out    Polio vaccine  Aged Out    Meningococcal (ACWY) vaccine  Aged Out       Labs  Lab Results   Component Value Date    LABA1C 5.7 (H) 10/31/2024     Lab Results   Component Value Date     (H) 10/31/2024     Lab Results   Component Value Date    TSH 1.170 09/27/2019     Lab Results   Component Value Date    CHOL 127 11/07/2023    CHOL 183 09/01/2023    CHOL 137 12/14/2022     Lab Results   Component Value Date    TRIG 95 11/07/2023    TRIG 147 09/01/2023    TRIG 168 12/14/2022     Lab Results   Component Value Date    HDL 43 11/07/2023    HDL 46 09/01/2023    HDL 38 12/14/2022     No components found for: \"LDLCALC\", \"LDLCHOLESTEROL\"    Lab Results   Component Value Date    VLDL 26 09/01/2023     No results found for: \"CHOLHDLRATIO\"    Review of Systems  Constitutional: negative for chills and fevers  Eyes: negative for irritation, redness and visual disturbance  Respiratory: negative for cough, shortness of breath and wheezing  Cardiovascular: negative for chest pain, irregular heart beat and palpitations   The remainder of systems were reviewed and negative.       Objective:    /84 (Site: Right Upper Arm, Position: Sitting, Cuff Size: Medium Adult)   Pulse 74   Resp 16   Ht 1.651 m (5' 5\")   Wt 92.2 kg (203 lb 3.2 oz)   BMI 33.81

## 2024-11-11 NOTE — PATIENT INSTRUCTIONS
Increase mounjaro to 12.5 mg weekly  Reduce lantus to 20 units daily  Send blood sugars in 1 month

## 2024-12-04 DIAGNOSIS — Z79.4 TYPE 2 DIABETES MELLITUS WITH HYPERGLYCEMIA, WITH LONG-TERM CURRENT USE OF INSULIN (HCC): ICD-10-CM

## 2024-12-04 DIAGNOSIS — E11.65 TYPE 2 DIABETES MELLITUS WITH HYPERGLYCEMIA, WITH LONG-TERM CURRENT USE OF INSULIN (HCC): ICD-10-CM

## 2024-12-06 RX ORDER — ATORVASTATIN CALCIUM 40 MG/1
40 TABLET, FILM COATED ORAL DAILY
Qty: 90 TABLET | Refills: 1 | Status: SHIPPED | OUTPATIENT
Start: 2024-12-06

## 2025-01-15 ENCOUNTER — HOSPITAL ENCOUNTER (OUTPATIENT)
Dept: MAMMOGRAPHY | Age: 60
Discharge: HOME | End: 2025-01-15
Payer: COMMERCIAL

## 2025-01-15 VITALS — BODY MASS INDEX: 33.82 KG/M2 | HEIGHT: 65 IN | WEIGHT: 203 LBS

## 2025-01-15 DIAGNOSIS — Z12.39 BREAST SCREENING: ICD-10-CM

## 2025-01-15 PROCEDURE — 77063 BREAST TOMOSYNTHESIS BI: CPT

## 2025-02-10 ENCOUNTER — HOSPITAL ENCOUNTER (OUTPATIENT)
Age: 60
Discharge: HOME | End: 2025-02-10
Payer: COMMERCIAL

## 2025-02-10 DIAGNOSIS — Z79.4 TYPE 2 DIABETES MELLITUS WITH HYPERGLYCEMIA, WITH LONG-TERM CURRENT USE OF INSULIN (HCC): ICD-10-CM

## 2025-02-10 DIAGNOSIS — E11.65 TYPE 2 DIABETES MELLITUS WITH HYPERGLYCEMIA, WITH LONG-TERM CURRENT USE OF INSULIN (HCC): ICD-10-CM

## 2025-02-10 LAB
ALBUMIN SERPL BCG-MCNC: 4.1 G/DL (ref 3.5–5.1)
ALP SERPL-CCNC: 96 U/L (ref 38–126)
ALT SERPL W/O P-5'-P-CCNC: 23 U/L (ref 11–66)
ANION GAP SERPL CALC-SCNC: 14 MEQ/L (ref 8–16)
AST SERPL-CCNC: 19 U/L (ref 5–40)
BILIRUB SERPL-MCNC: 0.4 MG/DL (ref 0.3–1.2)
BUN SERPL-MCNC: 12 MG/DL (ref 7–22)
CALCIUM SERPL-MCNC: 9.1 MG/DL (ref 8.5–10.5)
CHLORIDE SERPL-SCNC: 103 MEQ/L (ref 98–111)
CO2 SERPL-SCNC: 24 MEQ/L (ref 23–33)
CREAT SERPL-MCNC: 0.8 MG/DL (ref 0.4–1.2)
DEPRECATED MEAN GLUCOSE BLD GHB EST-ACNC: 108 MG/DL (ref 70–126)
GFR SERPL CREATININE-BSD FRML MDRD: 85 ML/MIN/1.73M2
GLUCOSE SERPL-MCNC: 92 MG/DL (ref 70–108)
HBA1C MFR BLD HPLC: 5.6 % (ref 4.4–6.4)
POTASSIUM SERPL-SCNC: 4.4 MEQ/L (ref 3.5–5.2)
PROT SERPL-MCNC: 6.7 G/DL (ref 6.1–8)
SODIUM SERPL-SCNC: 141 MEQ/L (ref 135–145)

## 2025-02-10 PROCEDURE — 80053 COMPREHEN METABOLIC PANEL: CPT

## 2025-02-10 PROCEDURE — 83036 HEMOGLOBIN GLYCOSYLATED A1C: CPT

## 2025-02-10 PROCEDURE — 36415 COLL VENOUS BLD VENIPUNCTURE: CPT

## 2025-02-17 ENCOUNTER — OFFICE VISIT (OUTPATIENT)
Age: 60
End: 2025-02-17
Payer: COMMERCIAL

## 2025-02-17 VITALS
BODY MASS INDEX: 33.34 KG/M2 | DIASTOLIC BLOOD PRESSURE: 90 MMHG | WEIGHT: 200.13 LBS | SYSTOLIC BLOOD PRESSURE: 142 MMHG | HEART RATE: 74 BPM | HEIGHT: 65 IN

## 2025-02-17 DIAGNOSIS — E78.2 MIXED HYPERLIPIDEMIA: ICD-10-CM

## 2025-02-17 DIAGNOSIS — E11.65 TYPE 2 DIABETES MELLITUS WITH HYPERGLYCEMIA, WITH LONG-TERM CURRENT USE OF INSULIN (HCC): Primary | ICD-10-CM

## 2025-02-17 DIAGNOSIS — Z79.4 TYPE 2 DIABETES MELLITUS WITH HYPERGLYCEMIA, WITH LONG-TERM CURRENT USE OF INSULIN (HCC): Primary | ICD-10-CM

## 2025-02-17 PROCEDURE — 3080F DIAST BP >= 90 MM HG: CPT | Performed by: INTERNAL MEDICINE

## 2025-02-17 PROCEDURE — 99214 OFFICE O/P EST MOD 30 MIN: CPT | Performed by: INTERNAL MEDICINE

## 2025-02-17 PROCEDURE — 3077F SYST BP >= 140 MM HG: CPT | Performed by: INTERNAL MEDICINE

## 2025-02-17 PROCEDURE — 3044F HG A1C LEVEL LT 7.0%: CPT | Performed by: INTERNAL MEDICINE

## 2025-02-17 RX ORDER — GABAPENTIN 600 MG/1
600 TABLET ORAL
COMMUNITY
Start: 2024-12-30

## 2025-02-17 RX ORDER — LEVOCETIRIZINE DIHYDROCHLORIDE 5 MG/1
5 TABLET, FILM COATED ORAL
COMMUNITY
Start: 2024-11-13

## 2025-02-17 RX ORDER — ATORVASTATIN CALCIUM 40 MG/1
40 TABLET, FILM COATED ORAL DAILY
Qty: 90 TABLET | Refills: 1 | Status: SHIPPED | OUTPATIENT
Start: 2025-02-17

## 2025-02-17 NOTE — PROGRESS NOTES
done    COVID-19 Vaccine (2 - 2024-25 season) 09/01/2024    Diabetic foot exam  01/25/2025    Diabetic Alb to Cr ratio (uACR) test  02/05/2025    Depression Monitoring  02/09/2025    Diabetic retinal exam  10/21/2025    Lipids  10/31/2025    A1C test (Diabetic or Prediabetic)  02/10/2026    GFR test (Diabetes, CKD 3-4, OR last GFR 15-59)  02/10/2026    Breast cancer screen  01/15/2027    DTaP/Tdap/Td vaccine (3 - Td or Tdap) 09/26/2033    Hepatitis C screen  Completed    HIV screen  Completed    Hepatitis A vaccine  Aged Out    Hib vaccine  Aged Out    Polio vaccine  Aged Out    Meningococcal (ACWY) vaccine  Aged Out    Pneumococcal 0-49 years Vaccine  Discontinued       Labs  Lab Results   Component Value Date    LABA1C 5.6 02/10/2025     Lab Results   Component Value Date     02/10/2025     Lab Results   Component Value Date    TSH 1.170 09/27/2019     Lab Results   Component Value Date    CHOL 127 11/07/2023    CHOL 183 09/01/2023    CHOL 137 12/14/2022     Lab Results   Component Value Date    TRIG 95 11/07/2023    TRIG 147 09/01/2023    TRIG 168 12/14/2022     Lab Results   Component Value Date    HDL 43 11/07/2023    HDL 46 09/01/2023    HDL 38 12/14/2022     No components found for: \"LDLCALC\", \"LDLCHOLESTEROL\"    Lab Results   Component Value Date    VLDL 26 09/01/2023     No results found for: \"CHOLHDLRATIO\"    Review of Systems  Constitutional: negative for chills and fevers  Eyes: negative for irritation, redness and visual disturbance  Respiratory: negative for cough, shortness of breath and wheezing  Cardiovascular: negative for chest pain, irregular heart beat and palpitations   The remainder of systems were reviewed and negative.       Objective:    BP (!) 142/90   Pulse 74   Ht 1.651 m (5' 5\")   Wt 90.8 kg (200 lb 2 oz)   BMI 33.30 kg/m²   Body surface area is 2.04 meters squared.      Physical Exam   General: alert, appears stated age, cooperative and no distress   Eyes: negative findings:

## 2025-03-20 DIAGNOSIS — Z79.4 TYPE 2 DIABETES MELLITUS WITH HYPERGLYCEMIA, WITH LONG-TERM CURRENT USE OF INSULIN (HCC): ICD-10-CM

## 2025-03-20 DIAGNOSIS — E11.65 TYPE 2 DIABETES MELLITUS WITH HYPERGLYCEMIA, WITH LONG-TERM CURRENT USE OF INSULIN (HCC): ICD-10-CM

## 2025-03-24 RX ORDER — ATORVASTATIN CALCIUM 40 MG/1
40 TABLET, FILM COATED ORAL DAILY
Qty: 90 TABLET | Refills: 1 | Status: SHIPPED | OUTPATIENT
Start: 2025-03-24

## 2025-04-14 ENCOUNTER — HOSPITAL ENCOUNTER (OUTPATIENT)
Dept: ULTRASOUND IMAGING | Age: 60
Discharge: HOME | End: 2025-04-14
Attending: FAMILY MEDICINE
Payer: COMMERCIAL

## 2025-04-14 DIAGNOSIS — E01.0 THYROMEGALY: ICD-10-CM

## 2025-04-14 DIAGNOSIS — R53.83 OTHER FATIGUE: ICD-10-CM

## 2025-04-14 PROCEDURE — 76536 US EXAM OF HEAD AND NECK: CPT

## 2025-06-02 ENCOUNTER — OFFICE VISIT (OUTPATIENT)
Age: 60
End: 2025-06-02
Payer: COMMERCIAL

## 2025-06-02 VITALS
HEART RATE: 66 BPM | WEIGHT: 201.4 LBS | HEIGHT: 65 IN | SYSTOLIC BLOOD PRESSURE: 130 MMHG | BODY MASS INDEX: 33.55 KG/M2 | DIASTOLIC BLOOD PRESSURE: 82 MMHG

## 2025-06-02 DIAGNOSIS — E78.2 MIXED HYPERLIPIDEMIA: ICD-10-CM

## 2025-06-02 DIAGNOSIS — E11.65 TYPE 2 DIABETES MELLITUS WITH HYPERGLYCEMIA, WITH LONG-TERM CURRENT USE OF INSULIN (HCC): Primary | ICD-10-CM

## 2025-06-02 DIAGNOSIS — E04.2 MULTINODULAR GOITER: ICD-10-CM

## 2025-06-02 DIAGNOSIS — Z79.4 TYPE 2 DIABETES MELLITUS WITH HYPERGLYCEMIA, WITH LONG-TERM CURRENT USE OF INSULIN (HCC): Primary | ICD-10-CM

## 2025-06-02 PROCEDURE — 3075F SYST BP GE 130 - 139MM HG: CPT | Performed by: INTERNAL MEDICINE

## 2025-06-02 PROCEDURE — 3044F HG A1C LEVEL LT 7.0%: CPT | Performed by: INTERNAL MEDICINE

## 2025-06-02 PROCEDURE — 3079F DIAST BP 80-89 MM HG: CPT | Performed by: INTERNAL MEDICINE

## 2025-06-02 PROCEDURE — 99214 OFFICE O/P EST MOD 30 MIN: CPT | Performed by: INTERNAL MEDICINE

## 2025-06-02 PROCEDURE — 95251 CONT GLUC MNTR ANALYSIS I&R: CPT | Performed by: INTERNAL MEDICINE

## 2025-06-02 RX ORDER — DULOXETINE HYDROCHLORIDE 60 MG/1
CAPSULE, DELAYED RELEASE ORAL
COMMUNITY
Start: 2025-03-31

## 2025-06-02 NOTE — PROGRESS NOTES
[facility]  [facility] ENDOCRINOLOGY  [address]  [address]  [place] [address]  Dept: [phone]  Loc: [phone]     Visit Date: 6/2/2025    Toña Valentine is a 59 y.o. female who presents today for:  Chief Complaint   Patient presents with    Follow-up     T2DM        The patient (or guardian, if applicable) and other individuals in attendance with the patient were advised that Artificial Intelligence will be utilized during this visit to record, process the conversation to generate a clinical note, and support improvement of the AI technology. The patient (or guardian, if applicable) and other individuals in attendance at the appointment consented to the use of AI, including the recording.       Subjective:      HPI   History of Present Illness  The patient is a 60-year-old female who presents for evaluation of type 2 diabetes mellitus, hyperlipidemia, and multinodular goiter.    She has been living with diabetes for the past 7 years. She was previously on Tresiba but discontinued its use in April due to the efficacy of Mounjaro in controlling her blood sugar levels. However, she has been without Mounjaro for the past 2 weeks due to insurance issues, resulting in elevated blood sugar levels. She reports increased appetite and weight gain since discontinuing Mounjaro. She monitors her blood sugar levels using Jessica, which indicates an average of 140. She reports no excessive urination but does experience dry mouth. She has reduced her soda intake from 7-8 cans per day to 2 and has increased her water consumption. Her vision remains unaffected, and she reports no foot problems or recent fatigue. She is currently on Tresiba at a dose of 20 units because she is out of Mounjaro.    She recently had an ultrasound done , which revealed 3 nodules on her thyroid. She underwent a partial thyroidectomy in the past, with the removed tissue being benign. She experiences

## 2025-06-02 NOTE — PROGRESS NOTES
CGMSINTERPRETATION    CGMS interpretation:  The patient is checking blood sugars 4 times a day using freestyle natalia continuous glucose monitoring system.  His download includes data from 5/20/2025 through 6/2/2025 and is sufficient for interpretation.  The CGM was active 96% of the time.  Average blood glucose is 140 mg/dL with a coefficient of variation of 21.3% and glucose management indicator of 6.7%  The patient was within the target range 89 percent of the time.  Hypoglycemia:  Blood glucose range between 54 mg/dL and 70mg/dL: 0%  Blood glucose is below 54 mg/dL: 0%  Hyperglycemia:  Blood glucoses between 181 and 250 mg/dL: 11%  Blood glucoses above 250 mg/dL: 0%  In summary, this patient is having good glycemic control  Recommendations: Please refer to the assessment and plan section

## 2025-07-05 ENCOUNTER — HOSPITAL ENCOUNTER (OUTPATIENT)
Age: 60
Discharge: HOME | End: 2025-07-05
Payer: COMMERCIAL

## 2025-07-05 DIAGNOSIS — Z79.4 TYPE 2 DIABETES MELLITUS WITH HYPERGLYCEMIA, WITH LONG-TERM CURRENT USE OF INSULIN (HCC): ICD-10-CM

## 2025-07-05 DIAGNOSIS — E11.65 TYPE 2 DIABETES MELLITUS WITH HYPERGLYCEMIA, WITH LONG-TERM CURRENT USE OF INSULIN (HCC): ICD-10-CM

## 2025-07-05 LAB
ALBUMIN SERPL BCG-MCNC: 4.2 G/DL (ref 3.4–4.9)
ALP SERPL-CCNC: 83 U/L (ref 38–126)
ALT SERPL W/O P-5'-P-CCNC: 30 U/L (ref 10–35)
AST SERPL-CCNC: 28 U/L (ref 10–35)
BILIRUB CONJ SERPL-MCNC: 0.4 MG/DL (ref 0–0.2)
BILIRUB SERPL-MCNC: 1 MG/DL (ref 0.3–1.2)
CHOLEST SERPL-MCNC: 107 MG/DL (ref 100–199)
CREAT UR-MCNC: 170 MG/DL
DEPRECATED MEAN GLUCOSE BLD GHB EST-ACNC: 111 MG/DL (ref 70–126)
HBA1C MFR BLD HPLC: 5.7 % (ref 4–6)
HDLC SERPL-MCNC: 45 MG/DL
LDLC SERPL CALC-MCNC: 41 MG/DL
MICROALBUMIN UR-MCNC: < 2 MG/DL
MICROALBUMIN/CREAT RATIO PNL UR: 12 MG/G (ref 0–30)
PROT SERPL-MCNC: 6.9 G/DL (ref 6.4–8.3)
TRIGL SERPL-MCNC: 107 MG/DL (ref 0–199)

## 2025-07-05 PROCEDURE — 83036 HEMOGLOBIN GLYCOSYLATED A1C: CPT

## 2025-07-05 PROCEDURE — 80076 HEPATIC FUNCTION PANEL: CPT

## 2025-07-05 PROCEDURE — 82043 UR ALBUMIN QUANTITATIVE: CPT

## 2025-07-05 PROCEDURE — 36415 COLL VENOUS BLD VENIPUNCTURE: CPT

## 2025-07-05 PROCEDURE — 80061 LIPID PANEL: CPT

## 2025-07-06 ENCOUNTER — RESULTS FOLLOW-UP (OUTPATIENT)
Age: 60
End: 2025-07-06

## 2025-07-08 ENCOUNTER — TRANSCRIBE ORDERS (OUTPATIENT)
Dept: ADMINISTRATIVE | Age: 60
End: 2025-07-08

## 2025-07-08 DIAGNOSIS — M25.711 OSTEOPHYTE OF RIGHT SHOULDER: ICD-10-CM

## 2025-07-08 DIAGNOSIS — Z96.611 PRESENCE OF RIGHT ARTIFICIAL SHOULDER JOINT: ICD-10-CM

## 2025-07-08 DIAGNOSIS — M89.8X9 BONY EXOSTOSIS: Primary | ICD-10-CM

## 2025-07-14 ENCOUNTER — HOSPITAL ENCOUNTER (OUTPATIENT)
Dept: CT IMAGING | Age: 60
Discharge: HOME | End: 2025-07-14
Payer: COMMERCIAL

## 2025-07-14 DIAGNOSIS — M25.711 OSTEOPHYTE OF RIGHT SHOULDER: ICD-10-CM

## 2025-07-14 DIAGNOSIS — Z96.611 PRESENCE OF RIGHT ARTIFICIAL SHOULDER JOINT: ICD-10-CM

## 2025-07-14 DIAGNOSIS — M89.8X9 BONY EXOSTOSIS: ICD-10-CM

## 2025-07-14 PROCEDURE — 73200 CT UPPER EXTREMITY W/O DYE: CPT

## 2025-09-02 DIAGNOSIS — Z79.4 TYPE 2 DIABETES MELLITUS WITH HYPERGLYCEMIA, WITH LONG-TERM CURRENT USE OF INSULIN (HCC): ICD-10-CM

## 2025-09-02 DIAGNOSIS — E11.65 TYPE 2 DIABETES MELLITUS WITH HYPERGLYCEMIA, WITH LONG-TERM CURRENT USE OF INSULIN (HCC): ICD-10-CM

## 2025-09-03 RX ORDER — ATORVASTATIN CALCIUM 40 MG/1
40 TABLET, FILM COATED ORAL DAILY
Qty: 90 TABLET | Refills: 1 | Status: SHIPPED | OUTPATIENT
Start: 2025-09-03

## (undated) DEVICE — Device

## (undated) DEVICE — REMOTE CONTROL KIT: Brand: FREELINK™

## (undated) DEVICE — BAG,BANDED,W/RUBBERBAND,STERILE,30X36: Brand: MEDLINE

## (undated) DEVICE — KIT,ANTI FOG,W/SPONGE & FLUID,SOFT PACK: Brand: MEDLINE

## (undated) DEVICE — KIT CHARGING CHRG BASE STN PWR SUPL CHRG BELT PRECIS SPECTR

## (undated) DEVICE — TUNNELER NEUROSTIMULATOR L28CM FOR SPNL CRD STIM SYS

## (undated) DEVICE — PAD,NON-ADHERENT,3X8,STERILE,LF,1/PK: Brand: MEDLINE

## (undated) DEVICE — HEAD POSITIONER, SURGICAL: Brand: DEROYAL

## (undated) DEVICE — NEEDLE SPNL L3.5IN PNK HUB S STL REG WALL FIT STYL W/ QNCKE

## (undated) DEVICE — BIPOLAR SEALER 23-112-1 AQM 6.0: Brand: AQUAMANTYS ®

## (undated) DEVICE — STRIP,CLOSURE,WOUND,MEDI-STRIP,1/2X4: Brand: MEDLINE

## (undated) DEVICE — 4-PORT MANIFOLD: Brand: NEPTUNE 2

## (undated) DEVICE — SKIN AFFIX SURG ADHESIVE 72/CS 0.55ML: Brand: MEDLINE

## (undated) DEVICE — INTEGUSEAL MICROBIAL SEALANT: Brand: AVANOS

## (undated) DEVICE — TUNNELER NEUROSTIMULATOR L35CM LNG FOR SPNL CRD STIM SYS

## (undated) DEVICE — FEEDING TUBE • RADIOPAQUE: Brand: ARGYLE

## (undated) DEVICE — SPK10281 JACKSON TABLE KIT: Brand: SPK10281 JACKSON TABLE KIT

## (undated) DEVICE — GENERAL LAPAROSCOPY PACK-LF: Brand: MEDLINE INDUSTRIES, INC.

## (undated) DEVICE — INTENDED FOR TISSUE SEPARATION, AND OTHER PROCEDURES THAT REQUIRE A SHARP SURGICAL BLADE TO PUNCTURE OR CUT.: Brand: BARD-PARKER ® CARBON RIB-BACK BLADES

## (undated) DEVICE — DRAIN WND SIL FLAT RADIOPAQUE 10MM FULL FLUTED

## (undated) DEVICE — NEEDLE INSUF L120MM DIA2MM DISP FOR PNEUMOPERI ENDOPATH

## (undated) DEVICE — 3M™ IOBAN™ 2 ANTIMICROBIAL INCISE DRAPE 6650EZ: Brand: IOBAN™ 2

## (undated) DEVICE — GLOVE ORANGE PI 7 1/2   MSG9075

## (undated) DEVICE — DRAIN SURG W10MMXL20CM SIL FULL PERF HUBLESS FLAT RADPQ

## (undated) DEVICE — SUTURE VCRL SZ 1 L18IN ABSRB VLT CTX L48MM 1/2 CIR J765D

## (undated) DEVICE — SUTURE ETHLN SZ 3-0 L18IN NONABSORBABLE BLK FS-1 L24MM 3/8 663H

## (undated) DEVICE — 4.0MM PRECISION ROUND

## (undated) DEVICE — CONMED DISPOSABLE BIPOLAR CABLE, 10' (3.05M): Brand: CONMED

## (undated) DEVICE — ROYAL SILK SURGICAL GOWN, XXL: Brand: CONVERTORS

## (undated) DEVICE — YANKAUER,BULB TIP,W/O VENT,RIGID,STERILE: Brand: MEDLINE

## (undated) DEVICE — PUMP SUC IRR TBNG L10FT W/ HNDPC ASSEMB STRYKEFLOW 2

## (undated) DEVICE — GLOVE ORANGE PI 8   MSG9080

## (undated) DEVICE — GAUZE,SPONGE,4"X4",12PLY,STERILE,LF,2'S: Brand: MEDLINE

## (undated) DEVICE — TUBING, SUCTION, 1/4" X 20', STRAIGHT: Brand: MEDLINE INDUSTRIES, INC.

## (undated) DEVICE — WARMER SCP 2 ANTIFOG LAP DISP

## (undated) DEVICE — SPONGE GZ W4XL4IN COT 12 PLY TYP VII WVN C FLD DSGN

## (undated) DEVICE — TROCAR ENDOSCP L100MM DIA11MM STBL SL BLDELSS DISP ENDOPATH

## (undated) DEVICE — JCKSON TBL POSTNER NO HD REST: Brand: MEDLINE INDUSTRIES, INC.

## (undated) DEVICE — EXCEL 10FT (3.05 M) INSUFFLATION TUBING SET WITH 0.1 MICRON FILTER: Brand: EXCEL

## (undated) DEVICE — GLOVE SURG SZ 65 THK91MIL LTX FREE SYN POLYISOPRENE

## (undated) DEVICE — GARMENT,MEDLINE,DVT,INT,CALF,MED, GEN2: Brand: MEDLINE

## (undated) DEVICE — DRESSING TRNSPAR W8XL12IN FLM SURESITE 123

## (undated) DEVICE — 3M™ BAIR HUGGER® MULTI ACCESS BLANKET, PEDIATRIC, FULL BODY, 10 PER CASE 31000: Brand: BAIR HUGGER™

## (undated) DEVICE — SUTURE MCRYL SZ 3-0 L27IN ABSRB UD L24MM PS-1 3/8 CIR PRIM Y936H

## (undated) DEVICE — C-ARMOR C-ARM EQUIPMENT COVERS CLEAR STERILE UNIVERSAL FIT 12 PER CASE: Brand: C-ARMOR

## (undated) DEVICE — KIT EVAC 400CC PVC RADPQ Y CONN

## (undated) DEVICE — CRADLE POS 3X5X24IN RASPBERRY ARM PRONE FOAM DISP

## (undated) DEVICE — ENDO KIT: Brand: MEDLINE INDUSTRIES, INC.

## (undated) DEVICE — SUTURE VICRYL + SZ 2-0 L27IN ABSRB UD CP-1 1/2 CIR REV CUT VCP266H

## (undated) DEVICE — SUTURE VICRYL + 1 L18IN ABSRB UD CTX L48MM 1/2 CIR TAPERPOINT

## (undated) DEVICE — UNIVERSAL MONOPOLAR LAPAROSCOPIC CABLE 10FT, 4MM PIN CONNECTOR: Brand: CONMED

## (undated) DEVICE — PACK-MAJOR

## (undated) DEVICE — CODMAN® SURGICAL PATTIES 1" X 1" (2.54CM X 2.54CM): Brand: CODMAN®

## (undated) DEVICE — 1010 S-DRAPE TOWEL DRAPE 10/BX: Brand: STERI-DRAPE™

## (undated) DEVICE — SOLUTION IV 1000ML LAC RINGERS PH 6.5 INJ USP VIAFLX PLAS

## (undated) DEVICE — GLOVE ORANGE PI 8 1/2   MSG9085

## (undated) DEVICE — DRESSING TRNSPAR W5XL4.5IN FLM SHT SEMIPERMEABLE WIND

## (undated) DEVICE — COVER ARMBRD W13XL28.5IN IMPERV BLU FOR OP RM

## (undated) DEVICE — 3M™ IOBAN™ 2 ANTIMICROBIAL INCISE DRAPE 6651EZ: Brand: IOBAN™ 2

## (undated) DEVICE — TROCAR ENDOSCP L100MM DIA11MM DIL TIP STBL SL DISP ENDOPATH

## (undated) DEVICE — TROCAR ENDOSCP L100MM DIA5MM BLDELSS STBL SL THRD OPT VW

## (undated) DEVICE — FLOSEAL HEMOSTATIC MATRIX, 5 ML: Brand: FLOSEAL

## (undated) DEVICE — GOWN,SIRUS,NON REINFRCD,LARGE,SET IN SL: Brand: MEDLINE

## (undated) DEVICE — TISSEEL VHSD 4 ML KT 1504515] BAXTER BIOSURGERY]

## (undated) DEVICE — SOLUTION IV IRRIG WATER 1000ML POUR BRL 2F7114

## (undated) DEVICE — DEVICE SUT W/ DEL TOOL KNOT PUSH DISP FIXATE

## (undated) DEVICE — GLOVE ORANGE PI 7   MSG9070

## (undated) DEVICE — EVACUATOR SURG 100CC SIL BLB SUCT RESVR FOR CLS WND DRNGE

## (undated) DEVICE — SOLUTION IV IRRIG POUR BRL 0.9% SODIUM CHL 2F7124

## (undated) DEVICE — E-Z CLEAN, NON-STICK, PTFE COATED, ELECTROSURGICAL BLADE ELECTRODE, MODIFIED EXTENDED INSULATION, 2.5 INCH (6.35 CM): Brand: MEGADYNE

## (undated) DEVICE — SET IRRIG L94IN ID0.281IN W/ 4.5IN DST FLX CONN 2 LD ON OFF

## (undated) DEVICE — Z DISCONTINUED APPLICATOR SURG PREP 0.35OZ 2% CHG 70% ISO ALC W/ HI LT

## (undated) DEVICE — BANDAGE ADH W1XL3IN NAT FAB WVN FLX DURABLE N ADH PD SEAL

## (undated) DEVICE — SUTURE VCRL SZ 2-0 L27IN ABSRB UD L36MM CP-1 1/2 CIR REV J266H

## (undated) DEVICE — DRAPE C ARM W36XL30IN RECTANG BND BG AND TAPE

## (undated) DEVICE — FORCEP RAD JAW W/NEEDLE 160CM

## (undated) DEVICE — CHLORAPREP 26ML ORANGE

## (undated) DEVICE — DRAIN SURG 10FR PVC TB W/ TRCR MID PERF NO RESVR HUBLESS

## (undated) DEVICE — BASIC SINGLE BASIN BTC-LF: Brand: MEDLINE INDUSTRIES, INC.

## (undated) DEVICE — SUTURE MCRYL SZ 4-0 L27IN ABSRB UD L19MM PS-2 1/2 CIR PRIM Y426H

## (undated) DEVICE — 3M™ WARMING BLANKET, UPPER BODY, 10 PER CASE, 42268: Brand: BAIR HUGGER™

## (undated) DEVICE — TAPE SURG W4INXL11YD 2IN PERF LINERLESS NONWOVEN MEDFIX EZ

## (undated) DEVICE — LINER SUCT CANSTR 1500CC SEMI RIG W/ POR HYDROPHOBIC SHUT

## (undated) DEVICE — DRAIN SURG W10MMXL20CM SIL FLAT HUBLESS W/ RADPQ STRP 3/4

## (undated) DEVICE — SOLUTION IV 1000ML 0.9% SOD CHL PH 5 INJ USP VIAFLX PLAS

## (undated) DEVICE — 450 ML BOTTLE OF 0.05% CHLORHEXIDINE GLUCONATE IN 99.95% STERILE WATER FOR IRRIGATION, USP AND APPLICATOR.: Brand: IRRISEPT ANTIMICROBIAL WOUND LAVAGE

## (undated) DEVICE — AGENT HEMSTAT W2XL4IN OXIDIZED REGENERATED CELOS ABSRB SFT

## (undated) DEVICE — 3M™ STERI-DRAPE™ INSTRUMENT POUCH 1018: Brand: STERI-DRAPE™

## (undated) DEVICE — CONMED SCOPE SAVER BITE BLOCK, 20X27 MM: Brand: SCOPE SAVER

## (undated) DEVICE — SPONGE LAP W18XL18IN WHT COT 4 PLY FLD STRUNG RADPQ DISP ST

## (undated) DEVICE — CONNECTOR TBNG AUX H2O JET DISP FOR OLY 160/180 SER

## (undated) DEVICE — PAD,O.R.,TABLE,CONV FOAM,2X20X72: Brand: MEDLINE

## (undated) DEVICE — BAG SPEC REM 224ML W4XL6IN DIA10MM 1 HND GYN DISP ENDOPCH

## (undated) DEVICE — DEVICE SUT SPNL CRD STIM SYS SURG EQUIP FIXATE

## (undated) DEVICE — BLADE ES ELASTOMERIC COAT INSUL DURABLE BEND UPTO 90DEG

## (undated) DEVICE — THERMOGARD PLUS ABC DUAL DISPERSIVE ELECTRODE: Brand: THERMOGARD

## (undated) DEVICE — DRAPE MICSCP W132XL406CM LENS DIA68MM W VARI LENS2 FOR LEICA

## (undated) DEVICE — ELECTROSURGICAL PENCIL BUTTON SWITCH E-Z CLEAN COATED BLADE ELECTRODE 10 FT (3 M) CORD HOLSTER: Brand: MEGADYNE

## (undated) DEVICE — SPONGE DRN W4XL4IN RAYON/POLYESTER 6 PLY NONWOVEN PRECUT

## (undated) DEVICE — APPLIER CLP M L L11.4IN DIA10MM ENDOSCP ROT MULT FOR LIG